# Patient Record
Sex: MALE | Race: WHITE | Employment: UNEMPLOYED | URBAN - METROPOLITAN AREA
[De-identification: names, ages, dates, MRNs, and addresses within clinical notes are randomized per-mention and may not be internally consistent; named-entity substitution may affect disease eponyms.]

---

## 2018-06-04 ENCOUNTER — TRANSFERRED RECORDS (OUTPATIENT)
Dept: HEALTH INFORMATION MANAGEMENT | Facility: CLINIC | Age: 43
End: 2018-06-04

## 2018-07-12 ENCOUNTER — TRANSFERRED RECORDS (OUTPATIENT)
Dept: HEALTH INFORMATION MANAGEMENT | Facility: CLINIC | Age: 43
End: 2018-07-12

## 2018-07-13 LAB — EJECTION FRACTION: 48

## 2018-07-30 ENCOUNTER — TRANSFERRED RECORDS (OUTPATIENT)
Dept: HEALTH INFORMATION MANAGEMENT | Facility: CLINIC | Age: 43
End: 2018-07-30

## 2018-07-30 LAB
ALT SERPL-CCNC: 35 IU/L (ref 17–63)
AST SERPL-CCNC: 80 IU/L (ref 15–41)

## 2018-08-02 LAB — EJECTION FRACTION: 53

## 2018-08-07 LAB
CREAT SERPL-MCNC: 0.4 MG/DL (ref 0.9–1.3)
GFR SERPL CREATININE-BSD FRML MDRD: >60 ML/MIN/1.73M2
GLUCOSE SERPL-MCNC: 112 MG/DL (ref 74–106)
POTASSIUM SERPL-SCNC: 4.3 MMOL/L (ref 3.6–5.1)

## 2018-09-11 ENCOUNTER — TRANSFERRED RECORDS (OUTPATIENT)
Dept: HEALTH INFORMATION MANAGEMENT | Facility: CLINIC | Age: 43
End: 2018-09-11

## 2019-01-17 ENCOUNTER — TRANSFERRED RECORDS (OUTPATIENT)
Dept: HEALTH INFORMATION MANAGEMENT | Facility: CLINIC | Age: 44
End: 2019-01-17

## 2019-01-17 LAB — EJECTION FRACTION: 53

## 2019-08-19 ENCOUNTER — HOSPITAL ENCOUNTER (EMERGENCY)
Facility: CLINIC | Age: 44
Discharge: HOME | End: 2019-08-19
Attending: EMERGENCY MEDICINE | Admitting: EMERGENCY MEDICINE

## 2019-08-19 VITALS
SYSTOLIC BLOOD PRESSURE: 134 MMHG | DIASTOLIC BLOOD PRESSURE: 104 MMHG | TEMPERATURE: 98.5 F | OXYGEN SATURATION: 98 % | RESPIRATION RATE: 16 BRPM | HEART RATE: 100 BPM

## 2019-08-19 DIAGNOSIS — R45.86 MOOD SWING: ICD-10-CM

## 2019-08-19 DIAGNOSIS — Z76.0 ENCOUNTER FOR MEDICATION REFILL: ICD-10-CM

## 2019-08-19 PROCEDURE — 99282 EMERGENCY DEPT VISIT SF MDM: CPT

## 2019-08-19 RX ORDER — IBUPROFEN 800 MG/1
800 TABLET, FILM COATED ORAL EVERY 8 HOURS PRN
Qty: 60 TABLET | Refills: 0 | Status: SHIPPED | OUTPATIENT
Start: 2019-08-19 | End: 2019-08-27

## 2019-08-19 ASSESSMENT — ENCOUNTER SYMPTOMS
MYALGIAS: 1
AGITATION: 1
FEVER: 0

## 2019-08-19 NOTE — ED AVS SNAPSHOT
[facility] Emergency Department  [address]  [facility] [address]  Phone:  [phone]  Fax:  [phone]                                    Anuj Pickens   MRN: [id number]    Department:  [facility] Emergency Department   Date of Visit:  8/19/2019           After Visit Summary Signature Page    I have received my discharge instructions, and my questions have been answered. I have discussed any challenges I see with this plan with the nurse or doctor.    ..........................................................................................................................................  Patient/Patient Representative Signature      ..........................................................................................................................................  Patient Representative Print Name and Relationship to Patient    ..................................................               ................................................  Date                                   Time    ..........................................................................................................................................  Reviewed by Signature/Title    ...................................................              ..............................................  Date                                               Time          22EPIC Rev 08/18

## 2019-08-19 NOTE — ED TRIAGE NOTES
"Pt as on \"mood stabilizers and pain meds\" during chemo for hodgkin's lymphoma.  Discharged in 3/19 and has not been on meds since.  Pt now feels he is having mood swings and angry issues and would like to restart meds.  "

## 2019-08-19 NOTE — ED PROVIDER NOTES
"  History     Chief Complaint:  Medication Refill    HPI   Anuj Pickens is a 44 year old male with a history of Non-hodgkin's lymphoma who presents for psychiatric evaluation. The patient states he was put on mood stabilizers for anger issues and bipolar disorder, but after being released from rehab in [place] he has been unable to get these medications. The patient denies fevers.     Allergies:  No Known Allergies     Medications:    Oxycodone  Melatonin  Potassium   \"Mood stabilizer\"    Past Medical History:    Non-hodgkin's lymphoma  Bipolar disorder  Headaches    Past Surgical History:    Port placement x2    Family History:    Maternal aunt: non-hodgkin's lymphoma     Social History:  Smoking Status: NA  Smokeless Tobacco: NA  Drug use: NA  Alcohol Use: NA    Marital Status:       Review of Systems   Constitutional: Negative for fever.   Musculoskeletal: Positive for myalgias (chronic pain s/p chemotherapy).   Psychiatric/Behavioral: Positive for agitation (mood swings).   All other systems reviewed and are negative.    Physical Exam     Patient Vitals for the past 24 hrs:   BP Temp Temp src Pulse Heart Rate Resp SpO2   08/19/19 1749 (!) 134/104 98.5  F (36.9  C) Oral 100 100 16 98 %      Physical Exam  Constitutional: Vital signs reviewed as above.   HEENT:    Head: No external signs of trauma noted.    Eyes: Conjunctivae are normal. Pupils are equal, round, and reactive to light.    Cardiovascular: Normal rate, regular rhythm and normal heart sounds.    Pulmonary/Chest: Effort normal and breath sounds normal. No respiratory distress. Patient has no wheezes.   Gastrointestinal: Soft, non-tender, non-distended.  Musculoskeletal: No gross deformities noted. Normal tone  Skin: Skin is warm and dry. No diaphoresis noted.   Neurological: Patient is alert and oriented to person, place, and time.   Psychiatric: Patient has a normal mood and affect. Calm. No hallucinations.      Emergency Department Course "     Emergency Department Course:    1854 Nursing notes and vitals reviewed.    1858 I performed an exam of the patient as documented above.     1910 I personally answered all questions prior to discharge    Impression & Plan      Medical Decision Making:  Anuj Pickens is a 44 year old male who presents to the emergency department today (per the patient) at the behest of his wife who wants the patient to be restarted on all of his medications.  He states that he was on medications while in [place] undergoing treatment for lymphoma, but as of March 19 when he was discharged was not given any prescriptions and then moved up here.  He states that he sometimes has anger outbursts and feels bad that he takes this out his wife.  I asked him what medications he was on but he states the medicine list is in a box that is still packed away.  He stated that he was on 5 mg oxycodone secondary to nerve pain from chemotherapy.  He has not taken this since March either.    While I sympathize with the patient, I did inform him that I would be unable to refill long-term oxycodone.  As he was unable to give me an exact medication list types and dosages I am also unable to refill these long-term medications.  Social work was not in the ED to help with primary care appointment scheduling.    I did prescribe him ibuprofen as he stated he was on this in the past.  I will encourage primary care follow-up as outlined above.  Anticipatory guidance given prior to discharge.    Diagnosis:    ICD-10-CM    1. Encounter for medication refill Z76.0    2. Mood swing R45.86      Disposition:   The patient is discharged to home.     Discharge Medications:  No discharge medications      Scribe Disclosure:  I, Winifred Wilkerson, am serving as a scribe at 6:54 PM on 8/19/2019 to document services personally performed by Jacob Ann DO based on my observations and the provider's statements to me.         [facility] EMERGENCY  DEPARTMENT       Ann, Jacob Gallego, DO  08/19/19 9516       Ann, Jacob Gallego,   08/20/19 1112

## 2019-08-20 ENCOUNTER — HOSPITAL ENCOUNTER (EMERGENCY)
Facility: CLINIC | Age: 44
Discharge: HOME | End: 2019-08-20
Attending: EMERGENCY MEDICINE | Admitting: EMERGENCY MEDICINE
Payer: COMMERCIAL

## 2019-08-20 VITALS
OXYGEN SATURATION: 98 % | RESPIRATION RATE: 16 BRPM | DIASTOLIC BLOOD PRESSURE: 79 MMHG | SYSTOLIC BLOOD PRESSURE: 118 MMHG | TEMPERATURE: 97.5 F | HEART RATE: 93 BPM

## 2019-08-20 DIAGNOSIS — R52 BODY ACHES: ICD-10-CM

## 2019-08-20 DIAGNOSIS — Z59.00 HOMELESSNESS: ICD-10-CM

## 2019-08-20 LAB
ANION GAP SERPL CALCULATED.3IONS-SCNC: 4 MMOL/L (ref 3–14)
BASOPHILS # BLD AUTO: 0.1 10E9/L (ref 0–0.2)
BASOPHILS NFR BLD AUTO: 0.8 %
BUN SERPL-MCNC: 15 MG/DL (ref 7–30)
CALCIUM SERPL-MCNC: 9.5 MG/DL (ref 8.5–10.1)
CHLORIDE SERPL-SCNC: 109 MMOL/L (ref 94–109)
CO2 SERPL-SCNC: 27 MMOL/L (ref 20–32)
CREAT SERPL-MCNC: 0.69 MG/DL (ref 0.66–1.25)
DIFFERENTIAL METHOD BLD: ABNORMAL
EOSINOPHIL # BLD AUTO: 0.2 10E9/L (ref 0–0.7)
EOSINOPHIL NFR BLD AUTO: 3.1 %
ERYTHROCYTE [DISTWIDTH] IN BLOOD BY AUTOMATED COUNT: 15.1 % (ref 10–15)
GFR SERPL CREATININE-BSD FRML MDRD: >90 ML/MIN/{1.73_M2}
GLUCOSE SERPL-MCNC: 92 MG/DL (ref 70–99)
HCT VFR BLD AUTO: 43.4 % (ref 40–53)
HGB BLD-MCNC: 14.7 G/DL (ref 13.3–17.7)
IMM GRANULOCYTES # BLD: 0 10E9/L (ref 0–0.4)
IMM GRANULOCYTES NFR BLD: 0.3 %
LYMPHOCYTES # BLD AUTO: 1.8 10E9/L (ref 0.8–5.3)
LYMPHOCYTES NFR BLD AUTO: 28.9 %
MCH RBC QN AUTO: 33.1 PG (ref 26.5–33)
MCHC RBC AUTO-ENTMCNC: 33.9 G/DL (ref 31.5–36.5)
MCV RBC AUTO: 98 FL (ref 78–100)
MONOCYTES # BLD AUTO: 0.4 10E9/L (ref 0–1.3)
MONOCYTES NFR BLD AUTO: 6.6 %
NEUTROPHILS # BLD AUTO: 3.7 10E9/L (ref 1.6–8.3)
NEUTROPHILS NFR BLD AUTO: 60.3 %
NRBC # BLD AUTO: 0 10*3/UL
NRBC BLD AUTO-RTO: 0 /100
PLATELET # BLD AUTO: 168 10E9/L (ref 150–450)
POTASSIUM SERPL-SCNC: 4 MMOL/L (ref 3.4–5.3)
RBC # BLD AUTO: 4.44 10E12/L (ref 4.4–5.9)
SODIUM SERPL-SCNC: 140 MMOL/L (ref 133–144)
WBC # BLD AUTO: 6.1 10E9/L (ref 4–11)

## 2019-08-20 PROCEDURE — 80048 BASIC METABOLIC PNL TOTAL CA: CPT | Performed by: EMERGENCY MEDICINE

## 2019-08-20 PROCEDURE — 25000132 ZZH RX MED GY IP 250 OP 250 PS 637: Performed by: EMERGENCY MEDICINE

## 2019-08-20 PROCEDURE — 99285 EMERGENCY DEPT VISIT HI MDM: CPT | Mod: 25

## 2019-08-20 PROCEDURE — 90791 PSYCH DIAGNOSTIC EVALUATION: CPT

## 2019-08-20 PROCEDURE — 85025 COMPLETE CBC W/AUTO DIFF WBC: CPT | Performed by: EMERGENCY MEDICINE

## 2019-08-20 PROCEDURE — 36415 COLL VENOUS BLD VENIPUNCTURE: CPT | Performed by: EMERGENCY MEDICINE

## 2019-08-20 RX ORDER — OXYCODONE HYDROCHLORIDE 5 MG/1
5 TABLET ORAL ONCE
Status: COMPLETED | OUTPATIENT
Start: 2019-08-20 | End: 2019-08-20

## 2019-08-20 RX ADMIN — OXYCODONE HYDROCHLORIDE 5 MG: 5 TABLET ORAL at 08:55

## 2019-08-20 SDOH — ECONOMIC STABILITY - HOUSING INSECURITY: HOMELESSNESS UNSPECIFIED: Z59.00

## 2019-08-20 ASSESSMENT — ENCOUNTER SYMPTOMS
HEADACHES: 1
MYALGIAS: 1
FEVER: 0

## 2019-08-20 NOTE — PROGRESS NOTES
Date of RN Care Coordinator Intervention: 08/20/19  Last Emergency Department Visit:  8/19  Collaborated with:  Patient; ED MD; ED RN; Yoselyn Dukes [facility] [phone]    Data:  Received request to assist with discharge planning and coordination.  Patient is currently homeless, with recent move from [place] with his family.   Will need shelter placement today along with immediate and long-term resources for housing, medical coverage and ongoing care.      Intervention:  Contact made with [facility] to reserve bed for this evening.     Transportation to [facility]-arranged by cab through [employer] (hospital account).   Patient has no funding available to pay for taxi on his own and no one able to provide transport.   [facility] will provide access to temp housing as well as assessment of needs and initiating community support services.      Assessment:  Patient in need of assist to establish services locally following recent move from [place].   Would also benefit from establishing primary care.     Plan:    Anticipated Disposition:  shelter    Barriers to d/c plan:  None    Sonia Merida RN, LICSW, CCM  RN Care Coordinator  [facility], Emergency Department  Phone  [phone]

## 2019-08-20 NOTE — PROGRESS NOTES
CM: Met with pt who is here today and last night. Pt stated he is trying to figure out what to do. The PT, his MIL, his fiancess and her 7 year old son are here. They moved her abruptly from [place] for SIg other to have a new job at the [employer] at [facility]. When they moved here there was not a plan for housing. PT and his family have been stating in Hotels for the past 3 weeks and have run out of money. Pt received SSDI . He was treated for cancer in [place], completed his CHemo/radiation. Spend time in TCU and d.c in May. Pt at baseline line uses a walker but only brought his Cane with     PT is tearful as he wants to be with his family. CHRIS had a 30 second conversation with Arely BREEN. She stated that she feels Pt is Mentally unstable. He has H/O Bi-polar and was seeing a therapist in [place].. Arely stated that pt has been claiming he wants to die. She feels pt should transfer to MH unit, Pt has never been in MH unit previously.     PT stated he just wants to figure out how to find a place to live with his family. PT is engaged. He has been with Cassandra for 7 months. He calls her child his son.     Cassandra stated that she has an appt tomorrow to apply for housing support. They have been in [place] for almost 3 weeks. She and Pt have MA from [place].    PT has spent may times living in a shelter in [place]. He does not want to stay in a shelter in [place] and became upset with Ericka when she was able to tell CHRIS that she and her child will stay with her brother in law. PT IS NOT welcome at this location     I/A  Pt is good at alerting his mood when talking with  staff. He was able to change his mood with the conversation. Being very understanding and grateful that Sw was talking with him. Becoming angry and yelling at his SO while SW was talking with her then asking for the phone and talking very softly expressing his love for her.     P: CHRIS spoke with Md about SO concerns and will have DEC assess pt for support.  Unfortunately pt does not have active MA in [place] and may have a delay in follow up support needs    SW handed of to CC in the ED should pt do well with his assessment that he will need bus tokens and referral to shelter in \[facility]\""

## 2019-08-20 NOTE — ED AVS SNAPSHOT
[facility] Emergency Department  [address]  [facility] [address]  Phone:  [phone]  Fax:  [phone]                                    Anuj Pickens   MRN: [id number]    Department:  [facility] Emergency Department   Date of Visit:  8/20/2019           After Visit Summary Signature Page    I have received my discharge instructions, and my questions have been answered. I have discussed any challenges I see with this plan with the nurse or doctor.    ..........................................................................................................................................  Patient/Patient Representative Signature      ..........................................................................................................................................  Patient Representative Print Name and Relationship to Patient    ..................................................               ................................................  Date                                   Time    ..........................................................................................................................................  Reviewed by Signature/Title    ...................................................              ..............................................  Date                                               Time          22EPIC Rev 08/18

## 2019-08-20 NOTE — DISCHARGE INSTRUCTIONS
Please follow-up with shelter resources provided as well as outpatient therapy resources provided.    It will be important for you to establish care now that you are living in the [facility].    You are welcome to return to the ER at any point with new or troubling symptoms, concerns for your own safety, or any other issues.    Discharge Instructions  Mental Health Concerns    You were seen today for mental health concerns, such as depression, anxiety, or suicidal thinking. Your provider feels that you do not require hospitalization at this time. However, your symptoms may become worse, and you may need to return to the Emergency Department. Most treatments of depression and suicidal thoughts are a process rather than a single intervention.  Medications and counseling can take several weeks or more to help.    Generally, every Emergency Department visit should have a follow-up clinic visit with either a primary or a specialty clinic/provider. Please follow-up as instructed by your emergency provider today.    By accepting these discharge instructions:  You promise to not harm yourself or others.  You agree that if you feel you are becoming unable to keep that promise, you will do something to help yourself before you do anything to harm yourself or others.   You agree to keep any safety plan arranged on your visit here today.  You agree to take any medication prescribed or recommended by your provider.  If you are getting worse, you can contact a friend or a family member, contact your counselor or family provider, contact a crisis line, or other options discussed with the provider or therapist today.  At any time, you can call 911 and return to the Emergency Department for more help.  You understand that follow-up is essential to your treatment, and you will make and keep appointments recommended on your visit today.    How to improve your mental health and prevent suicide:  Involve others by letting family,  friends, counselors know.  Do not isolate yourself.  Avoid alcohol or drugs. Remove weapons, poisons from your home.  Try to stick to routines for eating, sleeping and getting regular exercise.    Try to get into sunlight. Bright natural light not only treats seasonal affective disorder but also depression.  Increase safe activities that you enjoy.    If you feel worse, contact [phone] ([phone]), or call 911, or your primary provider/counselor for additional assistance.    If you were given a prescription for medicine here today, be sure to read all of the information (including the package insert) that comes with your prescription.  This will include important information about the medicine, its side effects, and any warnings that you need to know about.  The pharmacist who fills the prescription can provide more information and answer questions you may have about the medicine.  If you have questions or concerns that the pharmacist cannot address, please call or return to the Emergency Department.   Remember that you can always come back to the Emergency Department if you are not able to see your regular provider in the amount of time listed above, if you get any new symptoms, or if there is anything that worries you.

## 2019-08-20 NOTE — ED PROVIDER NOTES
"  History     Chief Complaint:  Myalgias    HPI   Anuj Pickens is a 44 year old male, with history of non-hodgkin's lymphoma and diagnosed with bipolar disorder, recently moved here from [place] with family, who presents alone for evaluation of generalized malaise and request for pain medications in the setting of being homeless. Patient was seen in the ED for similar complaint yesterday, but was discharged without prescriptions for his chronic issues as he was unable to provide an exact medication list type and dosages and social work was not available at the time. Patient reports he returns this morning as he began to develop worsening myalgias and migraine headache, prompting his presentation.     Here in the ED, patient states that he is homeless and has chronic pain secondary to his previous cancer therapy, thus would like pain medications. He states that he recently moved out to [place] approximately one month ago after finishing chemotherapy treatment at [facility] in [place], [place] \"a while ago\". He notes that he and his family were initially staying in motels, but then ran out of money and has no mode of transportation. He denies any noted fever.  He denies any other complaints or concerns at this time.     Allergies:  No Known Allergies      Medications:    Ibuprofen     Past Medical History:    Non-hodgkin's lymphoma  Bipolar disorder  Headaches     Past Surgical History:    Port placement x2     Family History:    Maternal aunt: non-hodgkin's lymphoma      Social History:  Smoking Status: NA  Smokeless Tobacco: NA  Drug use: NA  Alcohol Use: NA    Marital Status:      Review of Systems   Constitutional: Negative for fever.   Musculoskeletal: Positive for myalgias.   Neurological: Positive for headaches.   All other systems reviewed and are negative.      Physical Exam   Vitals:  Patient Vitals for the past 24 hrs:   BP Temp Temp src Pulse Heart Rate Resp SpO2   08/20/19 0703 -- -- -- 93 93 -- " --   08/20/19 0702 (!) 127/115 97.5  F (36.4  C) Temporal -- -- 18 98 %        Physical Exam  General:              Well-nourished              Speaking in full sentences              Resting comfortably on gurney  Eyes:              Conjunctiva without injection or scleral icterus  ENT:              Moist mucous membranes              Nares patent              Pinnae normal  Neck:              Full ROM              No stiffness appreciated  Resp:              Lungs CTAB              No crackles, wheezing or audible rubs              Good air movement  CV:                    Normal rate, regular rhythm              S1 and S2 present              No murmur, gallop or rub  GI:              BS present              Abdomen soft without distention              Non-tender to light and deep palpation              No guarding or rebound tenderness  Skin:              Warm, dry, well perfused              No rashes or open wounds on exposed skin              Port in place to right upper chest wall              No overlying skin changes  MSK:              Moves all extremities              No focal deformities or swelling  Neuro:              Alert              Answers questions appropriately              Moves all extremities equally              Gait stable  Psych:              Normal affect, normal mood  Emergency Department Course     Laboratory:  Laboratory findings were communicated with the patient who voiced understanding of the findings.  CBC: AWNL (WBC 6.1, HGB 14.7, )  BMP: AWNL (Creatinine 0.69)     Interventions:  0855 Oxycodone 5 mg PO     Emergency Department Course:  Nursing notes and vitals reviewed.  Blood was drawn for laboratory testing, results above.     (2872)   I performed an exam of the patient as documented above. History obtained from patient.    (8466)   I spoke with Corinne of the Social Work service regarding patient's presentation, findings, and plan of care.     (3630)   I spoke with  Corinne of the Social Work service regarding patient's presentation, findings, and plan of care. I spoke with Corinne of the Social Work service regarding patient's presentation, findings, and plan of care. She spoke to wife/irvin over the phone. She states that she has an appointment for housing tomorrow. Will have DEC assess patient.     (1032)   I spoke with DEC service regarding patient's presentation, findings, and plan of care.     (1044)   I spoke with DEC service regarding patient's presentation, findings, and plan of care.     (1121)   I spoke with DEC service regarding patient's presentation, findings, and plan of care.     (1143)   I spoke with DEC service regarding patient's presentation, findings, and plan of care.     (1151)   I spoke with Corinne of the Social Work service regarding patient's presentation, findings, and plan of care.    (1200)   I spoke with Sonia of the Patient Management Care Coordinator service regarding patient's presentation, findings, and plan of care. Will help patient establish PCP, shelter and bus tokens. She will speak to patient.     (1230)   Updated that patient will be discharged with several resources and a ride.     (1245)   Updated patient and discharge instructions provided.     I discussed the treatment plan with the patient. They expressed understanding of this plan and consented to discharge. They will be discharged home with instructions for care and follow up. In addition, the patient will return to the emergency department if their symptoms persist, worsen, if new symptoms arise or if there is any concern.  All questions were answered.     I personally reviewed the laboratory results with the Patient and answered all related questions prior to discharge.    Impression & Plan      Medical Decision Making:  Anuj Pickens is a 44-year-old male with a past medical history significant for non-Hodgkin's lymphoma, presenting to the emergency department for  evaluation of body aches.  VS on presentation reveal mildly elevated diastolic BP, though otherwise are unremarkable.  Additional history is gleaned from previous records available in epic, as well as review of faxed records from patient's previous hospital in [place].  Medically, he has completed treatment for non-Hodgkin's lymphoma, and is currently in remission.  With regards to his presenting complaints, he acknowledges chronic pain secondary to previous chemotherapy and radiation treatments.  At this time, he is clinically well-appearing, resting comfortably on the gurney, not found to be in any acute distress.  He was provided 1 dose of analgesia in concordance with our chronic pain policy, noting improvements in symptoms.  With regards to patient's headache, he acknowledges this seems similar to previous migraine headaches.  I feel this is not consistent or suggestive of acute intracranial process such as subarachnoid hemorrhage, intracranial mass, meningitis, or encephalitis requiring further work-up.  Baseline screening labs including CBC and BMP returned unremarkable.  I suspect a large contributing factor to patient's representation stems from his current social situation.  He left his home state of [place] rather abruptly, and currently is without a primary medical team, nor housing resources.  For that reason, social work was consulted and have seen and met with the patient here in the ER.  Please refer to their associated documentation.  Social work also obtain additional information from the patient's significant other, who acknowledged some concerns for mental health stability.  For that reason, DEC was additionally consulted.  They have also met with the patient, and contacted the patient's significant other to obtain collateral information.  She does acknowledge there have been previous comments regarding self-harm, though this has been present for a fairly long period of time.  DEC does not feel  the patient to be at imminent risk to himself or others in the outpatient environment.  I again met with the , who has graciously worked to provide the patient bus tokens, and transportation to a homeless shelter.  She explained this homeless shelter will be able to provide psychiatric assessment, and further assist the patient with establishing medical care in light of his limited financial and social resources.  On reevaluation, the patient felt very comfortable with this plan, and was gracious for the resources provided.  He is certainly welcome to return to the ER should he develop any other new or troubling symptoms or concerns regarding his own safety.  Additionally, therapy resources were provided to the patient, as recommended by DEC.  All of patient's questions were answered prior to discharge.    Diagnosis:    ICD-10-CM    1. Body aches R52    2. Homelessness Z59.0         Disposition:   Discharged.    Scribe Disclosure:  I, Starr Carroll, am serving as a scribe at 8:31 AM on 8/20/2019 to document services personally performed by Tolu Urias MD, based on my observations and the provider's statements to me.  8/20/2019   [facility] EMERGENCY DEPARTMENT       Tolu Urias MD  08/21/19 0946

## 2020-09-24 ENCOUNTER — TELEPHONE (OUTPATIENT)
Dept: SCHEDULING | Age: 45
End: 2020-09-24

## 2020-09-29 ENCOUNTER — TELEPHONE (OUTPATIENT)
Dept: SCHEDULING | Age: 45
End: 2020-09-29

## 2020-09-29 ENCOUNTER — TELEPHONIC VISIT (OUTPATIENT)
Dept: FAMILY MEDICINE | Age: 45
End: 2020-09-29

## 2020-10-06 ENCOUNTER — TELEPHONE (OUTPATIENT)
Dept: SCHEDULING | Age: 45
End: 2020-10-06

## 2020-10-09 ENCOUNTER — TELEPHONE (OUTPATIENT)
Dept: SCHEDULING | Age: 45
End: 2020-10-09

## 2020-10-21 ENCOUNTER — TELEPHONIC VISIT (OUTPATIENT)
Dept: FAMILY MEDICINE | Age: 45
End: 2020-10-21

## 2020-10-21 RX ORDER — TIZANIDINE 4 MG/1
4 TABLET ORAL 2 TIMES DAILY
COMMUNITY
End: 2021-01-25 | Stop reason: SDUPTHER

## 2020-10-21 RX ORDER — TRAMADOL HYDROCHLORIDE 50 MG/1
50 TABLET ORAL EVERY 6 HOURS PRN
COMMUNITY
End: 2020-12-09 | Stop reason: SDUPTHER

## 2020-10-21 RX ORDER — CLONIDINE HYDROCHLORIDE 0.1 MG/1
0.1 TABLET ORAL 2 TIMES DAILY
COMMUNITY
End: 2020-12-09 | Stop reason: SDUPTHER

## 2020-10-21 RX ORDER — IBUPROFEN 800 MG/1
800 TABLET ORAL EVERY 6 HOURS PRN
COMMUNITY
End: 2021-01-25 | Stop reason: SDUPTHER

## 2020-10-21 RX ORDER — GABAPENTIN 300 MG/1
300 CAPSULE ORAL 2 TIMES DAILY
COMMUNITY
End: 2020-12-09 | Stop reason: SDUPTHER

## 2020-10-21 RX ORDER — AMITRIPTYLINE HYDROCHLORIDE 50 MG/1
50 TABLET, FILM COATED ORAL NIGHTLY
COMMUNITY
End: 2020-12-09 | Stop reason: SDUPTHER

## 2020-10-21 RX ORDER — FERROUS SULFATE 325(65) MG
325 TABLET ORAL
COMMUNITY
End: 2021-02-23 | Stop reason: ALTCHOICE

## 2020-10-23 ENCOUNTER — TELEPHONE (OUTPATIENT)
Dept: SCHEDULING | Age: 45
End: 2020-10-23

## 2020-10-26 ENCOUNTER — TELEPHONE (OUTPATIENT)
Dept: SCHEDULING | Age: 45
End: 2020-10-26

## 2020-11-03 ENCOUNTER — TELEPHONE (OUTPATIENT)
Dept: SCHEDULING | Age: 45
End: 2020-11-03

## 2020-11-09 ENCOUNTER — V-VISIT (OUTPATIENT)
Dept: FAMILY MEDICINE | Age: 45
End: 2020-11-09

## 2020-11-09 DIAGNOSIS — I10 ESSENTIAL HYPERTENSION: ICD-10-CM

## 2020-11-09 DIAGNOSIS — M54.9 CHRONIC BACK PAIN, UNSPECIFIED BACK LOCATION, UNSPECIFIED BACK PAIN LATERALITY: ICD-10-CM

## 2020-11-09 DIAGNOSIS — Z85.71 HISTORY OF HODGKIN'S LYMPHOMA: Primary | ICD-10-CM

## 2020-11-09 DIAGNOSIS — G89.29 CHRONIC BACK PAIN, UNSPECIFIED BACK LOCATION, UNSPECIFIED BACK PAIN LATERALITY: ICD-10-CM

## 2020-11-09 PROCEDURE — 99203 OFFICE O/P NEW LOW 30 MIN: CPT | Performed by: FAMILY MEDICINE

## 2020-11-09 ASSESSMENT — PATIENT HEALTH QUESTIONNAIRE - PHQ9
SUM OF ALL RESPONSES TO PHQ9 QUESTIONS 1 AND 2: 0
1. LITTLE INTEREST OR PLEASURE IN DOING THINGS: NOT AT ALL
CLINICAL INTERPRETATION OF PHQ2 SCORE: NO FURTHER SCREENING NEEDED
CLINICAL INTERPRETATION OF PHQ9 SCORE: NO FURTHER SCREENING NEEDED
SUM OF ALL RESPONSES TO PHQ9 QUESTIONS 1 AND 2: 0
2. FEELING DOWN, DEPRESSED OR HOPELESS: NOT AT ALL

## 2020-11-11 ENCOUNTER — TELEPHONE (OUTPATIENT)
Dept: SCHEDULING | Age: 45
End: 2020-11-11

## 2020-11-11 ASSESSMENT — ENCOUNTER SYMPTOMS
COUGH: 0
FEVER: 0
SHORTNESS OF BREATH: 0
CHILLS: 0
FATIGUE: 0
BACK PAIN: 1
WHEEZING: 0
ABDOMINAL PAIN: 0

## 2020-11-24 ENCOUNTER — TELEPHONE (OUTPATIENT)
Dept: SCHEDULING | Age: 45
End: 2020-11-24

## 2020-12-10 RX ORDER — AMITRIPTYLINE HYDROCHLORIDE 50 MG/1
50 TABLET, FILM COATED ORAL NIGHTLY
Qty: 30 TABLET | Refills: 0 | Status: SHIPPED | OUTPATIENT
Start: 2020-12-10 | End: 2021-01-07

## 2020-12-10 RX ORDER — CLONIDINE HYDROCHLORIDE 0.1 MG/1
0.1 TABLET ORAL 2 TIMES DAILY
Qty: 60 TABLET | Refills: 0 | Status: SHIPPED | OUTPATIENT
Start: 2020-12-10 | End: 2021-01-07

## 2020-12-10 RX ORDER — TRAMADOL HYDROCHLORIDE 50 MG/1
50 TABLET ORAL EVERY 6 HOURS PRN
Qty: 30 TABLET | Refills: 0 | Status: SHIPPED | OUTPATIENT
Start: 2020-12-10 | End: 2021-01-25 | Stop reason: SDUPTHER

## 2020-12-10 RX ORDER — GABAPENTIN 300 MG/1
300 CAPSULE ORAL 2 TIMES DAILY
Qty: 60 CAPSULE | Refills: 0 | Status: SHIPPED | OUTPATIENT
Start: 2020-12-10 | End: 2021-01-07

## 2020-12-16 ENCOUNTER — TELEPHONIC VISIT (OUTPATIENT)
Dept: FAMILY MEDICINE | Age: 45
End: 2020-12-16

## 2020-12-16 DIAGNOSIS — Z85.71 HISTORY OF HODGKIN'S LYMPHOMA: Primary | ICD-10-CM

## 2020-12-16 DIAGNOSIS — G89.29 CHRONIC BACK PAIN, UNSPECIFIED BACK LOCATION, UNSPECIFIED BACK PAIN LATERALITY: ICD-10-CM

## 2020-12-16 DIAGNOSIS — M54.9 CHRONIC BACK PAIN, UNSPECIFIED BACK LOCATION, UNSPECIFIED BACK PAIN LATERALITY: ICD-10-CM

## 2020-12-16 DIAGNOSIS — I10 ESSENTIAL HYPERTENSION: ICD-10-CM

## 2020-12-16 PROCEDURE — 99442 TELEPHONE E&M BY PHYSICIAN EST PT NOT ORIG PREV 7 DAYS 11-20 MIN: CPT | Performed by: FAMILY MEDICINE

## 2020-12-16 ASSESSMENT — ANXIETY QUESTIONNAIRES
GAD7 TOTAL SCORE: 11
4. TROUBLE RELAXING: 1
1. FEELING NERVOUS, ANXIOUS, OR ON EDGE: NOT AT ALL
5. BEING SO RESTLESS THAT IT IS HARD TO SIT STILL: 0
4. TROUBLE RELAXING: SEVERAL DAYS
3. WORRYING TOO MUCH ABOUT DIFFERENT THINGS: 3
3. WORRYING TOO MUCH ABOUT DIFFERENT THINGS: NEARLY EVERY DAY
7. FEELING AFRAID AS IF SOMETHING AWFUL MIGHT HAPPEN: 3
7. FEELING AFRAID AS IF SOMETHING AWFUL MIGHT HAPPEN: NEARLY EVERY DAY
6. BECOMING EASILY ANNOYED OR IRRITABLE: 1
5. BEING SO RESTLESS THAT IT IS HARD TO SIT STILL: NOT AT ALL
IF YOU CHECKED OFF ANY PROBLEMS ON THIS QUESTIONNAIRE, HOW DIFFICULT HAVE THESE PROBLEMS MADE IT FOR YOU TO DO YOUR WORK, TAKE CARE OF THINGS AT HOME, OR GET ALONG WITH OTHER PEOPLE: SOMEWHAT DIFFICULT
2. NOT BEING ABLE TO STOP OR CONTROL WORRYING: NEARLY EVERY DAY
1. FEELING NERVOUS, ANXIOUS, OR ON EDGE: 0
2. NOT BEING ABLE TO STOP OR CONTROL WORRYING: 3
6. BECOMING EASILY ANNOYED OR IRRITABLE: SEVERAL DAYS

## 2020-12-16 ASSESSMENT — PATIENT HEALTH QUESTIONNAIRE - PHQ9
CLINICAL INTERPRETATION OF PHQ2 SCORE: NO FURTHER SCREENING NEEDED
2. FEELING DOWN, DEPRESSED OR HOPELESS: SEVERAL DAYS
CLINICAL INTERPRETATION OF PHQ9 SCORE: NO FURTHER SCREENING NEEDED
SUM OF ALL RESPONSES TO PHQ9 QUESTIONS 1 AND 2: 1
1. LITTLE INTEREST OR PLEASURE IN DOING THINGS: NOT AT ALL
SUM OF ALL RESPONSES TO PHQ9 QUESTIONS 1 AND 2: 1

## 2020-12-16 ASSESSMENT — ENCOUNTER SYMPTOMS
COUGH: 0
BACK PAIN: 1
CHILLS: 0
FEVER: 0
ABDOMINAL PAIN: 0
FATIGUE: 0
SHORTNESS OF BREATH: 0
WHEEZING: 0

## 2021-01-01 ENCOUNTER — EXTERNAL RECORD (OUTPATIENT)
Dept: HEALTH INFORMATION MANAGEMENT | Facility: OTHER | Age: 46
End: 2021-01-01

## 2021-01-06 ENCOUNTER — TELEPHONE (OUTPATIENT)
Dept: SCHEDULING | Age: 46
End: 2021-01-06

## 2021-01-07 ENCOUNTER — TELEPHONE (OUTPATIENT)
Dept: SCHEDULING | Age: 46
End: 2021-01-07

## 2021-01-07 RX ORDER — CLONIDINE HYDROCHLORIDE 0.1 MG/1
TABLET ORAL
Qty: 60 TABLET | Refills: 0 | Status: SHIPPED | OUTPATIENT
Start: 2021-01-07 | End: 2021-02-04

## 2021-01-07 RX ORDER — AMITRIPTYLINE HYDROCHLORIDE 50 MG/1
TABLET, FILM COATED ORAL
Qty: 30 TABLET | Refills: 0 | Status: SHIPPED | OUTPATIENT
Start: 2021-01-07 | End: 2021-01-25 | Stop reason: SDUPTHER

## 2021-01-07 RX ORDER — GABAPENTIN 300 MG/1
CAPSULE ORAL
Qty: 60 CAPSULE | Refills: 0 | Status: SHIPPED | OUTPATIENT
Start: 2021-01-07 | End: 2021-02-02

## 2021-01-16 RX ORDER — AMITRIPTYLINE HYDROCHLORIDE 50 MG/1
50 TABLET, FILM COATED ORAL NIGHTLY
Qty: 30 TABLET | Refills: 0 | OUTPATIENT
Start: 2021-01-16

## 2021-01-16 RX ORDER — TIZANIDINE 4 MG/1
4 TABLET ORAL 2 TIMES DAILY
OUTPATIENT
Start: 2021-01-16

## 2021-01-16 RX ORDER — TRAMADOL HYDROCHLORIDE 50 MG/1
50 TABLET ORAL EVERY 6 HOURS PRN
Qty: 30 TABLET | Refills: 0 | OUTPATIENT
Start: 2021-01-16

## 2021-01-16 RX ORDER — CLONIDINE HYDROCHLORIDE 0.1 MG/1
0.1 TABLET ORAL 2 TIMES DAILY
Qty: 60 TABLET | Refills: 0 | OUTPATIENT
Start: 2021-01-16

## 2021-01-16 RX ORDER — GABAPENTIN 300 MG/1
300 CAPSULE ORAL 2 TIMES DAILY
Qty: 60 CAPSULE | Refills: 0 | OUTPATIENT
Start: 2021-01-16

## 2021-01-19 ENCOUNTER — TELEPHONE (OUTPATIENT)
Dept: SCHEDULING | Age: 46
End: 2021-01-19

## 2021-01-19 ENCOUNTER — OFFICE VISIT (OUTPATIENT)
Dept: FAMILY MEDICINE | Age: 46
End: 2021-01-19

## 2021-01-19 VITALS
SYSTOLIC BLOOD PRESSURE: 133 MMHG | WEIGHT: 175.49 LBS | HEART RATE: 82 BPM | HEIGHT: 68 IN | TEMPERATURE: 98.3 F | DIASTOLIC BLOOD PRESSURE: 94 MMHG | BODY MASS INDEX: 26.6 KG/M2

## 2021-01-19 DIAGNOSIS — I10 ESSENTIAL HYPERTENSION: ICD-10-CM

## 2021-01-19 DIAGNOSIS — Z85.71 HISTORY OF HODGKIN'S LYMPHOMA: Primary | ICD-10-CM

## 2021-01-19 DIAGNOSIS — M54.9 CHRONIC BACK PAIN, UNSPECIFIED BACK LOCATION, UNSPECIFIED BACK PAIN LATERALITY: ICD-10-CM

## 2021-01-19 DIAGNOSIS — G89.29 CHRONIC BACK PAIN, UNSPECIFIED BACK LOCATION, UNSPECIFIED BACK PAIN LATERALITY: ICD-10-CM

## 2021-01-19 PROCEDURE — 85025 COMPLETE CBC W/AUTO DIFF WBC: CPT | Performed by: FAMILY MEDICINE

## 2021-01-19 PROCEDURE — 99215 OFFICE O/P EST HI 40 MIN: CPT | Performed by: FAMILY MEDICINE

## 2021-01-19 PROCEDURE — 3080F DIAST BP >= 90 MM HG: CPT | Performed by: FAMILY MEDICINE

## 2021-01-19 PROCEDURE — 80053 COMPREHEN METABOLIC PANEL: CPT | Performed by: FAMILY MEDICINE

## 2021-01-19 PROCEDURE — 3075F SYST BP GE 130 - 139MM HG: CPT | Performed by: FAMILY MEDICINE

## 2021-01-19 ASSESSMENT — PATIENT HEALTH QUESTIONNAIRE - PHQ9
1. LITTLE INTEREST OR PLEASURE IN DOING THINGS: NOT AT ALL
CLINICAL INTERPRETATION OF PHQ9 SCORE: NO FURTHER SCREENING NEEDED
SUM OF ALL RESPONSES TO PHQ9 QUESTIONS 1 AND 2: 0
CLINICAL INTERPRETATION OF PHQ2 SCORE: NO FURTHER SCREENING NEEDED
2. FEELING DOWN, DEPRESSED OR HOPELESS: NOT AT ALL
SUM OF ALL RESPONSES TO PHQ9 QUESTIONS 1 AND 2: 0

## 2021-01-20 PROBLEM — I10 ESSENTIAL HYPERTENSION: Status: ACTIVE | Noted: 2021-01-20

## 2021-01-20 LAB
ALBUMIN SERPL-MCNC: 4.4 G/DL (ref 3.6–5.1)
ALBUMIN/GLOB SERPL: 1.4 {RATIO} (ref 1–2.4)
ALP SERPL-CCNC: 181 UNITS/L (ref 45–117)
ALT SERPL-CCNC: 80 UNITS/L
ANION GAP SERPL CALC-SCNC: 13 MMOL/L (ref 10–20)
AST SERPL-CCNC: 30 UNITS/L
BASOPHILS # BLD: 0.1 K/MCL (ref 0–0.3)
BASOPHILS NFR BLD: 1 %
BILIRUB SERPL-MCNC: 0.6 MG/DL (ref 0.2–1)
BUN SERPL-MCNC: 19 MG/DL (ref 6–20)
BUN/CREAT SERPL: 20 (ref 7–25)
CALCIUM SERPL-MCNC: 9.5 MG/DL (ref 8.4–10.2)
CHLORIDE SERPL-SCNC: 103 MMOL/L (ref 98–107)
CO2 SERPL-SCNC: 27 MMOL/L (ref 21–32)
CREAT SERPL-MCNC: 0.97 MG/DL (ref 0.67–1.17)
DEPRECATED RDW RBC: 47.1 FL (ref 39–50)
EOSINOPHIL # BLD: 0.4 K/MCL (ref 0–0.5)
EOSINOPHIL NFR BLD: 5 %
ERYTHROCYTE [DISTWIDTH] IN BLOOD: 13.3 % (ref 11–15)
FASTING DURATION TIME PATIENT: ABNORMAL H
GFR SERPLBLD BASED ON 1.73 SQ M-ARVRAT: >90 ML/MIN/1.73M2
GLOBULIN SER-MCNC: 3.2 G/DL (ref 2–4)
GLUCOSE SERPL-MCNC: 92 MG/DL (ref 65–99)
HCT VFR BLD CALC: 45 % (ref 39–51)
HGB BLD-MCNC: 15.3 G/DL (ref 13–17)
IMM GRANULOCYTES # BLD AUTO: 0.1 K/MCL (ref 0–0.2)
IMM GRANULOCYTES # BLD: 1 %
LYMPHOCYTES # BLD: 2.9 K/MCL (ref 1–4.8)
LYMPHOCYTES NFR BLD: 39 %
MCH RBC QN AUTO: 32.6 PG (ref 26–34)
MCHC RBC AUTO-ENTMCNC: 34 G/DL (ref 32–36.5)
MCV RBC AUTO: 95.9 FL (ref 78–100)
MONOCYTES # BLD: 0.4 K/MCL (ref 0.3–0.9)
MONOCYTES NFR BLD: 5 %
NEUTROPHILS # BLD: 3.7 K/MCL (ref 1.8–7.7)
NEUTROPHILS NFR BLD: 49 %
NRBC BLD MANUAL-RTO: 0 /100 WBC
PLATELET # BLD AUTO: 202 K/MCL (ref 140–450)
POTASSIUM SERPL-SCNC: 4.3 MMOL/L (ref 3.4–5.1)
PROT SERPL-MCNC: 7.6 G/DL (ref 6.4–8.2)
RBC # BLD: 4.69 MIL/MCL (ref 4.5–5.9)
SODIUM SERPL-SCNC: 139 MMOL/L (ref 135–145)
WBC # BLD: 7.5 K/MCL (ref 4.2–11)

## 2021-01-20 ASSESSMENT — ENCOUNTER SYMPTOMS
SHORTNESS OF BREATH: 0
WHEEZING: 0
CHILLS: 0
BACK PAIN: 1
COUGH: 0
FATIGUE: 0
FEVER: 0

## 2021-01-25 RX ORDER — TRAMADOL HYDROCHLORIDE 50 MG/1
50 TABLET ORAL EVERY 6 HOURS PRN
Qty: 30 TABLET | Refills: 0 | Status: SHIPPED | OUTPATIENT
Start: 2021-01-25 | End: 2021-02-03 | Stop reason: SDUPTHER

## 2021-01-25 RX ORDER — AMITRIPTYLINE HYDROCHLORIDE 50 MG/1
50 TABLET, FILM COATED ORAL NIGHTLY
Qty: 30 TABLET | Refills: 0 | Status: SHIPPED | OUTPATIENT
Start: 2021-01-25 | End: 2021-01-30 | Stop reason: SDUPTHER

## 2021-01-25 RX ORDER — IBUPROFEN 800 MG/1
800 TABLET ORAL EVERY 8 HOURS PRN
Qty: 30 TABLET | Refills: 0 | Status: SHIPPED | OUTPATIENT
Start: 2021-01-25 | End: 2021-03-02 | Stop reason: SDUPTHER

## 2021-01-25 RX ORDER — TIZANIDINE 4 MG/1
4 TABLET ORAL 2 TIMES DAILY
Qty: 60 TABLET | Refills: 0 | Status: SHIPPED | OUTPATIENT
Start: 2021-01-25 | End: 2021-01-30 | Stop reason: SDUPTHER

## 2021-01-25 RX ORDER — FERROUS SULFATE 325(65) MG
325 TABLET ORAL
OUTPATIENT
Start: 2021-01-25

## 2021-01-26 ENCOUNTER — TELEPHONE (OUTPATIENT)
Dept: SCHEDULING | Age: 46
End: 2021-01-26

## 2021-01-31 RX ORDER — AMITRIPTYLINE HYDROCHLORIDE 50 MG/1
50 TABLET, FILM COATED ORAL NIGHTLY
Qty: 30 TABLET | Refills: 1 | Status: SHIPPED | OUTPATIENT
Start: 2021-01-31 | End: 2021-03-02 | Stop reason: SDUPTHER

## 2021-02-01 ENCOUNTER — TELEPHONE (OUTPATIENT)
Dept: SCHEDULING | Age: 46
End: 2021-02-01

## 2021-02-01 RX ORDER — TIZANIDINE 4 MG/1
4 TABLET ORAL 2 TIMES DAILY
Qty: 60 TABLET | Refills: 0 | Status: SHIPPED | OUTPATIENT
Start: 2021-02-01 | End: 2021-03-02

## 2021-02-01 RX ORDER — FERROUS SULFATE 325(65) MG
325 TABLET ORAL
Qty: 90 TABLET | Refills: 0 | OUTPATIENT
Start: 2021-02-01

## 2021-02-02 RX ORDER — GABAPENTIN 300 MG/1
CAPSULE ORAL
Qty: 60 CAPSULE | Refills: 0 | Status: SHIPPED | OUTPATIENT
Start: 2021-02-02 | End: 2021-03-02 | Stop reason: SDUPTHER

## 2021-02-03 ENCOUNTER — TELEPHONE (OUTPATIENT)
Dept: SCHEDULING | Age: 46
End: 2021-02-03

## 2021-02-03 RX ORDER — TRAMADOL HYDROCHLORIDE 50 MG/1
50 TABLET ORAL EVERY 6 HOURS PRN
Qty: 30 TABLET | Refills: 0 | Status: SHIPPED | OUTPATIENT
Start: 2021-02-03 | End: 2021-03-02 | Stop reason: SDUPTHER

## 2021-02-03 RX ORDER — TRAMADOL HYDROCHLORIDE 50 MG/1
50 TABLET ORAL EVERY 6 HOURS PRN
Qty: 30 TABLET | Refills: 0 | Status: CANCELLED | OUTPATIENT
Start: 2021-02-03

## 2021-02-04 RX ORDER — CLONIDINE HYDROCHLORIDE 0.1 MG/1
TABLET ORAL
Qty: 60 TABLET | Refills: 0 | Status: SHIPPED | OUTPATIENT
Start: 2021-02-04 | End: 2021-03-02

## 2021-02-15 ENCOUNTER — TELEPHONE (OUTPATIENT)
Dept: SCHEDULING | Age: 46
End: 2021-02-15

## 2021-02-16 ENCOUNTER — TELEPHONE (OUTPATIENT)
Dept: SCHEDULING | Age: 46
End: 2021-02-16

## 2021-02-18 ENCOUNTER — APPOINTMENT (OUTPATIENT)
Dept: HEMATOLOGY/ONCOLOGY | Age: 46
End: 2021-02-18

## 2021-02-23 ENCOUNTER — TELEPHONE (OUTPATIENT)
Dept: SCHEDULING | Age: 46
End: 2021-02-23

## 2021-02-23 ENCOUNTER — TELEPHONIC VISIT (OUTPATIENT)
Dept: FAMILY MEDICINE | Age: 46
End: 2021-02-23

## 2021-02-23 ENCOUNTER — V-VISIT (OUTPATIENT)
Dept: FAMILY MEDICINE | Age: 46
End: 2021-02-23

## 2021-02-23 ENCOUNTER — APPOINTMENT (OUTPATIENT)
Dept: FAMILY MEDICINE | Age: 46
End: 2021-02-23

## 2021-02-23 DIAGNOSIS — I10 ESSENTIAL HYPERTENSION: ICD-10-CM

## 2021-02-23 DIAGNOSIS — R69 DIAGNOSIS DEFERRED: Primary | ICD-10-CM

## 2021-02-23 DIAGNOSIS — M54.9 CHRONIC BACK PAIN, UNSPECIFIED BACK LOCATION, UNSPECIFIED BACK PAIN LATERALITY: ICD-10-CM

## 2021-02-23 DIAGNOSIS — Z85.71 HISTORY OF HODGKIN'S LYMPHOMA: Primary | ICD-10-CM

## 2021-02-23 DIAGNOSIS — G89.29 CHRONIC BACK PAIN, UNSPECIFIED BACK LOCATION, UNSPECIFIED BACK PAIN LATERALITY: ICD-10-CM

## 2021-02-23 PROCEDURE — 99441 TELEPHONE E&M BY PHYSICIAN EST PT NOT ORIG PREV 7 DAYS 5-10 MIN: CPT | Performed by: FAMILY MEDICINE

## 2021-02-23 ASSESSMENT — PATIENT HEALTH QUESTIONNAIRE - PHQ9
SUM OF ALL RESPONSES TO PHQ QUESTIONS 1-9: 17
SUM OF ALL RESPONSES TO PHQ9 QUESTIONS 1 TO 9: 17
CLINICAL INTERPRETATION OF PHQ2 SCORE: FURTHER SCREENING NEEDED
SUM OF ALL RESPONSES TO PHQ9 QUESTIONS 1 AND 2: 6
3. TROUBLE FALLING OR STAYING ASLEEP OR SLEEPING TOO MUCH: MORE THAN HALF THE DAYS
6. FEELING BAD ABOUT YOURSELF - OR THAT YOU ARE A FAILURE OR HAVE LET YOURSELF OR YOUR FAMILY DOWN: SEVERAL DAYS
CLINICAL INTERPRETATION OF PHQ9 SCORE: MODERATELY SEVERE DEPRESSION
CLINICAL INTERPRETATION OF PHQ9 SCORE: FURTHER SCREENING NEEDED
9. THOUGHTS THAT YOU WOULD BE BETTER OFF DEAD, OR OF HURTING YOURSELF: NOT AT ALL
7. TROUBLE CONCENTRATING ON THINGS, SUCH AS READING THE NEWSPAPER OR WATCHING TELEVISION: MORE THAN HALF THE DAYS
5. POOR APPETITE, WEIGHT LOSS, OR OVEREATING: NEARLY EVERY DAY
SUM OF ALL RESPONSES TO PHQ9 QUESTIONS 1 AND 2: 6
2. FEELING DOWN, DEPRESSED OR HOPELESS: NEARLY EVERY DAY
4. FEELING TIRED OR HAVING LITTLE ENERGY: NOT AT ALL
8. MOVING OR SPEAKING SO SLOWLY THAT OTHER PEOPLE COULD HAVE NOTICED. OR THE OPPOSITE, BEING SO FIGETY OR RESTLESS THAT YOU HAVE BEEN MOVING AROUND A LOT MORE THAN USUAL: NEARLY EVERY DAY
10. IF YOU CHECKED OFF ANY PROBLEMS, HOW DIFFICULT HAVE THESE PROBLEMS MADE IT FOR YOU TO DO YOUR WORK, TAKE CARE OF THINGS AT HOME, OR GET ALONG WITH OTHER PEOPLE: VERY DIFFICULT
1. LITTLE INTEREST OR PLEASURE IN DOING THINGS: NEARLY EVERY DAY
CLINICAL INTERPRETATION OF PHQ2 SCORE: MODERATELY SEVERE DEPRESSION

## 2021-02-23 ASSESSMENT — ENCOUNTER SYMPTOMS
COUGH: 0
FEVER: 0
SHORTNESS OF BREATH: 0
FATIGUE: 0
WHEEZING: 0
ABDOMINAL PAIN: 0
CHILLS: 0

## 2021-02-24 ENCOUNTER — TELEPHONE (OUTPATIENT)
Dept: SCHEDULING | Age: 46
End: 2021-02-24

## 2021-02-26 ENCOUNTER — TELEPHONE (OUTPATIENT)
Dept: SCHEDULING | Age: 46
End: 2021-02-26

## 2021-03-02 RX ORDER — IBUPROFEN 800 MG/1
800 TABLET ORAL EVERY 8 HOURS PRN
Qty: 30 TABLET | Refills: 0 | Status: SHIPPED | OUTPATIENT
Start: 2021-03-02 | End: 2021-04-08 | Stop reason: SDUPTHER

## 2021-03-02 RX ORDER — CLONIDINE HYDROCHLORIDE 0.1 MG/1
TABLET ORAL
Qty: 60 TABLET | Refills: 0 | Status: SHIPPED | OUTPATIENT
Start: 2021-03-02 | End: 2021-04-01

## 2021-03-02 RX ORDER — GABAPENTIN 300 MG/1
300 CAPSULE ORAL 2 TIMES DAILY
Qty: 60 CAPSULE | Refills: 0 | Status: SHIPPED | OUTPATIENT
Start: 2021-03-02 | End: 2021-04-01

## 2021-03-02 RX ORDER — TIZANIDINE 4 MG/1
TABLET ORAL
Qty: 60 TABLET | Refills: 0 | Status: SHIPPED | OUTPATIENT
Start: 2021-03-02 | End: 2021-04-01

## 2021-03-02 RX ORDER — TRAMADOL HYDROCHLORIDE 50 MG/1
50 TABLET ORAL EVERY 6 HOURS PRN
Qty: 30 TABLET | Refills: 0 | Status: SHIPPED | OUTPATIENT
Start: 2021-03-02 | End: 2021-04-08 | Stop reason: SDUPTHER

## 2021-03-02 RX ORDER — GABAPENTIN 300 MG/1
CAPSULE ORAL
Qty: 60 CAPSULE | Refills: 0 | Status: SHIPPED | OUTPATIENT
Start: 2021-03-02 | End: 2021-07-06 | Stop reason: SDUPTHER

## 2021-03-02 RX ORDER — AMITRIPTYLINE HYDROCHLORIDE 50 MG/1
50 TABLET, FILM COATED ORAL NIGHTLY
Qty: 30 TABLET | Refills: 1 | Status: SHIPPED | OUTPATIENT
Start: 2021-03-02 | End: 2021-03-31 | Stop reason: SDUPTHER

## 2021-03-03 ENCOUNTER — TELEPHONE (OUTPATIENT)
Dept: HEMATOLOGY/ONCOLOGY | Age: 46
End: 2021-03-03

## 2021-03-03 ENCOUNTER — TELEPHONE (OUTPATIENT)
Dept: SCHEDULING | Age: 46
End: 2021-03-03

## 2021-03-05 ENCOUNTER — APPOINTMENT (OUTPATIENT)
Dept: HEMATOLOGY/ONCOLOGY | Age: 46
End: 2021-03-05

## 2021-03-09 ENCOUNTER — TELEPHONE (OUTPATIENT)
Dept: SCHEDULING | Age: 46
End: 2021-03-09

## 2021-03-19 ENCOUNTER — APPOINTMENT (OUTPATIENT)
Dept: HEMATOLOGY/ONCOLOGY | Age: 46
End: 2021-03-19
Attending: FAMILY MEDICINE

## 2021-03-26 ENCOUNTER — TELEPHONE (OUTPATIENT)
Dept: SCHEDULING | Age: 46
End: 2021-03-26

## 2021-03-31 ENCOUNTER — OFFICE VISIT (OUTPATIENT)
Dept: FAMILY MEDICINE | Age: 46
End: 2021-03-31

## 2021-03-31 VITALS
SYSTOLIC BLOOD PRESSURE: 138 MMHG | WEIGHT: 175.49 LBS | BODY MASS INDEX: 26.6 KG/M2 | TEMPERATURE: 97.8 F | HEIGHT: 68 IN | HEART RATE: 91 BPM | DIASTOLIC BLOOD PRESSURE: 95 MMHG

## 2021-03-31 DIAGNOSIS — F43.10 PTSD (POST-TRAUMATIC STRESS DISORDER): Primary | ICD-10-CM

## 2021-03-31 DIAGNOSIS — Z85.71 HISTORY OF HODGKIN'S LYMPHOMA: ICD-10-CM

## 2021-03-31 DIAGNOSIS — Z51.89: ICD-10-CM

## 2021-03-31 DIAGNOSIS — Z59.00 HOMELESSNESS: ICD-10-CM

## 2021-03-31 PROCEDURE — 99215 OFFICE O/P EST HI 40 MIN: CPT | Performed by: FAMILY MEDICINE

## 2021-03-31 PROCEDURE — 3075F SYST BP GE 130 - 139MM HG: CPT | Performed by: FAMILY MEDICINE

## 2021-03-31 PROCEDURE — 3080F DIAST BP >= 90 MM HG: CPT | Performed by: FAMILY MEDICINE

## 2021-03-31 RX ORDER — AMITRIPTYLINE HYDROCHLORIDE 75 MG/1
75 TABLET ORAL NIGHTLY
Qty: 30 TABLET | Refills: 0 | Status: SHIPPED | OUTPATIENT
Start: 2021-03-31 | End: 2021-04-23 | Stop reason: SDUPTHER

## 2021-03-31 SDOH — ECONOMIC STABILITY - HOUSING INSECURITY: HOMELESSNESS UNSPECIFIED: Z59.00

## 2021-03-31 ASSESSMENT — PATIENT HEALTH QUESTIONNAIRE - PHQ9
SUM OF ALL RESPONSES TO PHQ9 QUESTIONS 1 AND 2: 1
CLINICAL INTERPRETATION OF PHQ9 SCORE: NO FURTHER SCREENING NEEDED
CLINICAL INTERPRETATION OF PHQ2 SCORE: NO FURTHER SCREENING NEEDED
1. LITTLE INTEREST OR PLEASURE IN DOING THINGS: SEVERAL DAYS
2. FEELING DOWN, DEPRESSED OR HOPELESS: NOT AT ALL
SUM OF ALL RESPONSES TO PHQ9 QUESTIONS 1 AND 2: 1

## 2021-04-01 RX ORDER — CLONIDINE HYDROCHLORIDE 0.1 MG/1
TABLET ORAL
Qty: 60 TABLET | Refills: 0 | Status: SHIPPED | OUTPATIENT
Start: 2021-04-01 | End: 2021-04-23 | Stop reason: SDUPTHER

## 2021-04-01 RX ORDER — TIZANIDINE 4 MG/1
TABLET ORAL
Qty: 60 TABLET | Refills: 0 | Status: SHIPPED | OUTPATIENT
Start: 2021-04-01 | End: 2021-04-23 | Stop reason: SDUPTHER

## 2021-04-01 RX ORDER — GABAPENTIN 300 MG/1
CAPSULE ORAL
Qty: 60 CAPSULE | Refills: 0 | Status: SHIPPED | OUTPATIENT
Start: 2021-04-01 | End: 2021-04-23 | Stop reason: SDUPTHER

## 2021-04-01 ASSESSMENT — ENCOUNTER SYMPTOMS
WEAKNESS: 1
ABDOMINAL PAIN: 0
BACK PAIN: 1
FATIGUE: 0
WHEEZING: 0
CHILLS: 0
SHORTNESS OF BREATH: 0
SLEEP DISTURBANCE: 1
CONFUSION: 1
COUGH: 0
FEVER: 0

## 2021-04-06 ENCOUNTER — TELEPHONE (OUTPATIENT)
Dept: SCHEDULING | Age: 46
End: 2021-04-06

## 2021-04-08 RX ORDER — TRAMADOL HYDROCHLORIDE 50 MG/1
50 TABLET ORAL EVERY 6 HOURS PRN
Qty: 30 TABLET | Refills: 0 | Status: SHIPPED | OUTPATIENT
Start: 2021-04-08 | End: 2021-04-23 | Stop reason: SDUPTHER

## 2021-04-08 RX ORDER — IBUPROFEN 800 MG/1
800 TABLET ORAL EVERY 8 HOURS PRN
Qty: 30 TABLET | Refills: 0 | Status: SHIPPED | OUTPATIENT
Start: 2021-04-08 | End: 2021-04-23 | Stop reason: SDUPTHER

## 2021-04-09 ENCOUNTER — OFFICE VISIT (OUTPATIENT)
Dept: HEMATOLOGY/ONCOLOGY | Age: 46
End: 2021-04-09
Attending: FAMILY MEDICINE

## 2021-04-09 ENCOUNTER — NURSE ONLY (OUTPATIENT)
Dept: HEMATOLOGY/ONCOLOGY | Age: 46
End: 2021-04-09

## 2021-04-09 DIAGNOSIS — Z85.71 HISTORY OF HODGKIN'S DISEASE: Primary | ICD-10-CM

## 2021-04-09 DIAGNOSIS — Z85.71 HISTORY OF HODGKIN'S DISEASE: ICD-10-CM

## 2021-04-09 PROCEDURE — 3074F SYST BP LT 130 MM HG: CPT | Performed by: INTERNAL MEDICINE

## 2021-04-09 PROCEDURE — 3078F DIAST BP <80 MM HG: CPT | Performed by: INTERNAL MEDICINE

## 2021-04-09 PROCEDURE — 99203 OFFICE O/P NEW LOW 30 MIN: CPT | Performed by: INTERNAL MEDICINE

## 2021-04-09 PROCEDURE — X1094 NO CHARGE VISIT: HCPCS

## 2021-04-09 ASSESSMENT — PATIENT HEALTH QUESTIONNAIRE - PHQ9
2. FEELING DOWN, DEPRESSED OR HOPELESS: NOT AT ALL
CLINICAL INTERPRETATION OF PHQ9 SCORE: NO FURTHER SCREENING NEEDED
SUM OF ALL RESPONSES TO PHQ9 QUESTIONS 1 AND 2: 0
SUM OF ALL RESPONSES TO PHQ9 QUESTIONS 1 AND 2: 0
CLINICAL INTERPRETATION OF PHQ2 SCORE: NO FURTHER SCREENING NEEDED
1. LITTLE INTEREST OR PLEASURE IN DOING THINGS: NOT AT ALL

## 2021-04-09 ASSESSMENT — ENCOUNTER SYMPTOMS
FEVER: 0
NERVOUS/ANXIOUS: 1
ENDOCRINE NEGATIVE: 1
NEUROLOGICAL NEGATIVE: 1
GASTROINTESTINAL NEGATIVE: 1
ACTIVITY CHANGE: 0
RESPIRATORY NEGATIVE: 1
FATIGUE: 0
APPETITE CHANGE: 0
UNEXPECTED WEIGHT CHANGE: 0
BACK PAIN: 1
HEMATOLOGIC/LYMPHATIC NEGATIVE: 1

## 2021-04-09 ASSESSMENT — PAIN SCALES - GENERAL: PAINLEVEL: 0

## 2021-04-17 ENCOUNTER — TELEPHONE (OUTPATIENT)
Dept: SCHEDULING | Age: 46
End: 2021-04-17

## 2021-04-23 ENCOUNTER — TELEPHONE (OUTPATIENT)
Dept: SCHEDULING | Age: 46
End: 2021-04-23

## 2021-04-23 DIAGNOSIS — F43.10 PTSD (POST-TRAUMATIC STRESS DISORDER): ICD-10-CM

## 2021-04-26 RX ORDER — TRAMADOL HYDROCHLORIDE 50 MG/1
50 TABLET ORAL EVERY 6 HOURS PRN
Qty: 30 TABLET | Refills: 0 | Status: SHIPPED | OUTPATIENT
Start: 2021-04-26 | End: 2021-06-07 | Stop reason: SDUPTHER

## 2021-04-26 RX ORDER — CLONIDINE HYDROCHLORIDE 0.1 MG/1
0.1 TABLET ORAL 2 TIMES DAILY
Qty: 60 TABLET | Refills: 0 | Status: SHIPPED | OUTPATIENT
Start: 2021-04-26 | End: 2021-04-28

## 2021-04-26 RX ORDER — TIZANIDINE 4 MG/1
4 TABLET ORAL 2 TIMES DAILY
Qty: 60 TABLET | Refills: 0 | Status: SHIPPED | OUTPATIENT
Start: 2021-04-26 | End: 2021-04-28

## 2021-04-26 RX ORDER — GABAPENTIN 300 MG/1
300 CAPSULE ORAL 2 TIMES DAILY
Qty: 60 CAPSULE | Refills: 0 | Status: SHIPPED | OUTPATIENT
Start: 2021-04-26 | End: 2021-04-28

## 2021-04-26 RX ORDER — AMITRIPTYLINE HYDROCHLORIDE 75 MG/1
75 TABLET ORAL NIGHTLY
Qty: 30 TABLET | Refills: 0 | Status: SHIPPED | OUTPATIENT
Start: 2021-04-26 | End: 2021-06-08

## 2021-04-26 RX ORDER — IBUPROFEN 800 MG/1
800 TABLET ORAL EVERY 8 HOURS PRN
Qty: 30 TABLET | Refills: 0 | Status: SHIPPED | OUTPATIENT
Start: 2021-04-26 | End: 2021-06-08 | Stop reason: SDUPTHER

## 2021-04-27 ENCOUNTER — TELEPHONE (OUTPATIENT)
Dept: SCHEDULING | Age: 46
End: 2021-04-27

## 2021-04-28 RX ORDER — CLONIDINE HYDROCHLORIDE 0.1 MG/1
TABLET ORAL
Qty: 60 TABLET | Refills: 0 | Status: SHIPPED | OUTPATIENT
Start: 2021-04-28 | End: 2021-06-08 | Stop reason: SDUPTHER

## 2021-04-28 RX ORDER — TIZANIDINE 4 MG/1
TABLET ORAL
Qty: 60 TABLET | Refills: 0 | Status: SHIPPED | OUTPATIENT
Start: 2021-04-28 | End: 2021-06-08 | Stop reason: SDUPTHER

## 2021-04-28 RX ORDER — GABAPENTIN 300 MG/1
CAPSULE ORAL
Qty: 60 CAPSULE | Refills: 0 | Status: SHIPPED | OUTPATIENT
Start: 2021-04-28 | End: 2021-06-08 | Stop reason: SDUPTHER

## 2021-05-21 ENCOUNTER — E-ADVICE (OUTPATIENT)
Dept: INTERNAL MEDICINE | Age: 46
End: 2021-05-21

## 2021-05-21 ENCOUNTER — TELEPHONE (OUTPATIENT)
Dept: INTERNAL MEDICINE | Age: 46
End: 2021-05-21

## 2021-05-26 VITALS
WEIGHT: 176 LBS | TEMPERATURE: 98.2 F | HEART RATE: 79 BPM | DIASTOLIC BLOOD PRESSURE: 73 MMHG | SYSTOLIC BLOOD PRESSURE: 115 MMHG | OXYGEN SATURATION: 100 % | BODY MASS INDEX: 26.76 KG/M2

## 2021-05-28 ENCOUNTER — TELEPHONE (OUTPATIENT)
Dept: SCHEDULING | Age: 46
End: 2021-05-28

## 2021-06-01 RX ORDER — TIZANIDINE 4 MG/1
TABLET ORAL
Qty: 60 TABLET | Refills: 0 | OUTPATIENT
Start: 2021-06-01

## 2021-06-02 ENCOUNTER — TELEPHONE (OUTPATIENT)
Dept: INTERNAL MEDICINE | Age: 46
End: 2021-06-02

## 2021-06-03 ENCOUNTER — APPOINTMENT (OUTPATIENT)
Dept: INTERNAL MEDICINE | Age: 46
End: 2021-06-03

## 2021-06-04 ENCOUNTER — TELEPHONE (OUTPATIENT)
Dept: SCHEDULING | Age: 46
End: 2021-06-04

## 2021-06-07 ENCOUNTER — TELEPHONE (OUTPATIENT)
Dept: SCHEDULING | Age: 46
End: 2021-06-07

## 2021-06-07 ENCOUNTER — APPOINTMENT (OUTPATIENT)
Dept: FAMILY MEDICINE | Age: 46
End: 2021-06-07

## 2021-06-07 DIAGNOSIS — F43.10 PTSD (POST-TRAUMATIC STRESS DISORDER): ICD-10-CM

## 2021-06-07 RX ORDER — CLONIDINE HYDROCHLORIDE 0.1 MG/1
0.1 TABLET ORAL 2 TIMES DAILY
Qty: 60 TABLET | Refills: 0 | Status: CANCELLED | OUTPATIENT
Start: 2021-06-07

## 2021-06-07 RX ORDER — IBUPROFEN 800 MG/1
800 TABLET ORAL EVERY 8 HOURS PRN
Qty: 30 TABLET | Refills: 0 | Status: CANCELLED | OUTPATIENT
Start: 2021-06-07

## 2021-06-07 RX ORDER — IBUPROFEN 800 MG/1
800 TABLET ORAL EVERY 8 HOURS PRN
Qty: 30 TABLET | Refills: 0 | OUTPATIENT
Start: 2021-06-07

## 2021-06-07 RX ORDER — TRAMADOL HYDROCHLORIDE 50 MG/1
50 TABLET ORAL EVERY 6 HOURS PRN
Qty: 30 TABLET | Refills: 0 | OUTPATIENT
Start: 2021-06-07

## 2021-06-07 RX ORDER — AMITRIPTYLINE HYDROCHLORIDE 75 MG/1
75 TABLET ORAL NIGHTLY
Qty: 30 TABLET | Refills: 0 | OUTPATIENT
Start: 2021-06-07

## 2021-06-07 RX ORDER — CLONIDINE HYDROCHLORIDE 0.1 MG/1
0.1 TABLET ORAL 2 TIMES DAILY
Qty: 60 TABLET | Refills: 0 | OUTPATIENT
Start: 2021-06-07

## 2021-06-08 ENCOUNTER — TELEPHONE (OUTPATIENT)
Dept: SCHEDULING | Age: 46
End: 2021-06-08

## 2021-06-08 DIAGNOSIS — F43.10 PTSD (POST-TRAUMATIC STRESS DISORDER): ICD-10-CM

## 2021-06-08 RX ORDER — TIZANIDINE 4 MG/1
4 TABLET ORAL 2 TIMES DAILY
Qty: 60 TABLET | Refills: 0 | Status: SHIPPED | OUTPATIENT
Start: 2021-06-08 | End: 2021-07-06 | Stop reason: SDUPTHER

## 2021-06-08 RX ORDER — IBUPROFEN 800 MG/1
800 TABLET ORAL EVERY 8 HOURS PRN
Qty: 30 TABLET | Refills: 0 | Status: SHIPPED | OUTPATIENT
Start: 2021-06-08 | End: 2021-07-06 | Stop reason: SDUPTHER

## 2021-06-08 RX ORDER — TRAMADOL HYDROCHLORIDE 50 MG/1
50 TABLET ORAL EVERY 6 HOURS PRN
Qty: 30 TABLET | Refills: 1 | Status: SHIPPED | OUTPATIENT
Start: 2021-06-08 | End: 2021-07-06 | Stop reason: SDUPTHER

## 2021-06-08 RX ORDER — CLONIDINE HYDROCHLORIDE 0.1 MG/1
0.1 TABLET ORAL 2 TIMES DAILY
Qty: 60 TABLET | Refills: 0 | Status: SHIPPED | OUTPATIENT
Start: 2021-06-08 | End: 2021-07-06 | Stop reason: SDUPTHER

## 2021-06-08 RX ORDER — GABAPENTIN 300 MG/1
300 CAPSULE ORAL 2 TIMES DAILY
Qty: 60 CAPSULE | Refills: 0 | Status: SHIPPED | OUTPATIENT
Start: 2021-06-08 | End: 2021-07-06 | Stop reason: SDUPTHER

## 2021-06-08 RX ORDER — AMITRIPTYLINE HYDROCHLORIDE 75 MG/1
TABLET ORAL
Qty: 30 TABLET | Refills: 0 | Status: SHIPPED | OUTPATIENT
Start: 2021-06-08 | End: 2021-07-06 | Stop reason: SDUPTHER

## 2021-06-09 RX ORDER — AMITRIPTYLINE HYDROCHLORIDE 75 MG/1
75 TABLET ORAL NIGHTLY
Qty: 30 TABLET | Refills: 0 | OUTPATIENT
Start: 2021-06-09

## 2021-07-06 ENCOUNTER — OFFICE VISIT (OUTPATIENT)
Dept: INTERNAL MEDICINE | Age: 46
End: 2021-07-06

## 2021-07-06 VITALS
SYSTOLIC BLOOD PRESSURE: 124 MMHG | BODY MASS INDEX: 27.28 KG/M2 | HEIGHT: 68 IN | TEMPERATURE: 97.7 F | DIASTOLIC BLOOD PRESSURE: 100 MMHG | OXYGEN SATURATION: 97 % | HEART RATE: 89 BPM | WEIGHT: 180 LBS

## 2021-07-06 DIAGNOSIS — F43.10 PTSD (POST-TRAUMATIC STRESS DISORDER): ICD-10-CM

## 2021-07-06 DIAGNOSIS — F41.9 ANXIETY: ICD-10-CM

## 2021-07-06 DIAGNOSIS — R29.898 LEFT LEG WEAKNESS: ICD-10-CM

## 2021-07-06 DIAGNOSIS — Z85.71 H/O HODGKIN'S LYMPHOMA: ICD-10-CM

## 2021-07-06 DIAGNOSIS — F51.01 PRIMARY INSOMNIA: ICD-10-CM

## 2021-07-06 DIAGNOSIS — I10 ESSENTIAL HYPERTENSION: Primary | ICD-10-CM

## 2021-07-06 PROCEDURE — 3074F SYST BP LT 130 MM HG: CPT | Performed by: INTERNAL MEDICINE

## 2021-07-06 PROCEDURE — 99204 OFFICE O/P NEW MOD 45 MIN: CPT | Performed by: INTERNAL MEDICINE

## 2021-07-06 PROCEDURE — 3080F DIAST BP >= 90 MM HG: CPT | Performed by: INTERNAL MEDICINE

## 2021-07-06 RX ORDER — IBUPROFEN 800 MG/1
800 TABLET ORAL EVERY 8 HOURS PRN
Qty: 30 TABLET | Refills: 3 | Status: SHIPPED | OUTPATIENT
Start: 2021-07-06 | End: 2021-08-23 | Stop reason: SDUPTHER

## 2021-07-06 RX ORDER — CLONIDINE HYDROCHLORIDE 0.1 MG/1
0.1 TABLET ORAL 2 TIMES DAILY
Qty: 60 TABLET | Refills: 3 | Status: SHIPPED | OUTPATIENT
Start: 2021-07-06 | End: 2021-11-01

## 2021-07-06 RX ORDER — ONDANSETRON 8 MG/1
8 TABLET, ORALLY DISINTEGRATING ORAL EVERY 8 HOURS PRN
COMMUNITY
End: 2021-07-06 | Stop reason: ALTCHOICE

## 2021-07-06 RX ORDER — TIZANIDINE 4 MG/1
4 TABLET ORAL 2 TIMES DAILY
Qty: 60 TABLET | Refills: 3 | Status: SHIPPED | OUTPATIENT
Start: 2021-07-06 | End: 2021-12-03 | Stop reason: SDUPTHER

## 2021-07-06 RX ORDER — TRAMADOL HYDROCHLORIDE 50 MG/1
50 TABLET ORAL EVERY 6 HOURS PRN
Qty: 30 TABLET | Refills: 3 | Status: SHIPPED | OUTPATIENT
Start: 2021-07-06 | End: 2021-11-15

## 2021-07-06 RX ORDER — GABAPENTIN 300 MG/1
300 CAPSULE ORAL 2 TIMES DAILY
Qty: 60 CAPSULE | Refills: 3 | Status: SHIPPED | OUTPATIENT
Start: 2021-07-06 | End: 2021-11-11 | Stop reason: SDUPTHER

## 2021-07-06 RX ORDER — AMITRIPTYLINE HYDROCHLORIDE 75 MG/1
75 TABLET ORAL NIGHTLY
Qty: 30 TABLET | Refills: 3 | Status: SHIPPED | OUTPATIENT
Start: 2021-07-06 | End: 2021-11-01

## 2021-07-06 ASSESSMENT — PATIENT HEALTH QUESTIONNAIRE - PHQ9
10. IF YOU CHECKED OFF ANY PROBLEMS, HOW DIFFICULT HAVE THESE PROBLEMS MADE IT FOR YOU TO DO YOUR WORK, TAKE CARE OF THINGS AT HOME, OR GET ALONG WITH OTHER PEOPLE: NOT DIFFICULT AT ALL
CLINICAL INTERPRETATION OF PHQ2 SCORE: FURTHER SCREENING NEEDED
7. TROUBLE CONCENTRATING ON THINGS, SUCH AS READING THE NEWSPAPER OR WATCHING TELEVISION: NOT AT ALL
SUM OF ALL RESPONSES TO PHQ9 QUESTIONS 1 AND 2: 3
SUM OF ALL RESPONSES TO PHQ QUESTIONS 1-9: 7
CLINICAL INTERPRETATION OF PHQ2 SCORE: MILD DEPRESSION
CLINICAL INTERPRETATION OF PHQ9 SCORE: FURTHER SCREENING NEEDED
1. LITTLE INTEREST OR PLEASURE IN DOING THINGS: NOT AT ALL
2. FEELING DOWN, DEPRESSED OR HOPELESS: NEARLY EVERY DAY
SUM OF ALL RESPONSES TO PHQ9 QUESTIONS 1 TO 9: 7
8. MOVING OR SPEAKING SO SLOWLY THAT OTHER PEOPLE COULD HAVE NOTICED. OR THE OPPOSITE, BEING SO FIGETY OR RESTLESS THAT YOU HAVE BEEN MOVING AROUND A LOT MORE THAN USUAL: NOT AT ALL
CLINICAL INTERPRETATION OF PHQ9 SCORE: MILD DEPRESSION
6. FEELING BAD ABOUT YOURSELF - OR THAT YOU ARE A FAILURE OR HAVE LET YOURSELF OR YOUR FAMILY DOWN: SEVERAL DAYS
4. FEELING TIRED OR HAVING LITTLE ENERGY: NEARLY EVERY DAY
3. TROUBLE FALLING OR STAYING ASLEEP OR SLEEPING TOO MUCH: NOT AT ALL
SUM OF ALL RESPONSES TO PHQ9 QUESTIONS 1 AND 2: 3
5. POOR APPETITE, WEIGHT LOSS, OR OVEREATING: NOT AT ALL
9. THOUGHTS THAT YOU WOULD BE BETTER OFF DEAD, OR OF HURTING YOURSELF: NOT AT ALL

## 2021-07-07 ENCOUNTER — TELEPHONE (OUTPATIENT)
Dept: ORTHOPEDICS | Age: 46
End: 2021-07-07

## 2021-07-07 DIAGNOSIS — M54.40 CHRONIC BILATERAL LOW BACK PAIN WITH SCIATICA, SCIATICA LATERALITY UNSPECIFIED: Primary | ICD-10-CM

## 2021-07-07 DIAGNOSIS — M25.562 ACUTE PAIN OF LEFT KNEE: ICD-10-CM

## 2021-07-07 DIAGNOSIS — G89.29 CHRONIC BILATERAL LOW BACK PAIN WITH SCIATICA, SCIATICA LATERALITY UNSPECIFIED: Primary | ICD-10-CM

## 2021-07-07 DIAGNOSIS — R26.9 GAIT ABNORMALITY: ICD-10-CM

## 2021-07-21 ENCOUNTER — HOSPITAL ENCOUNTER (OUTPATIENT)
Dept: CT IMAGING | Age: 46
End: 2021-07-21
Attending: INTERNAL MEDICINE

## 2021-07-21 ASSESSMENT — ENCOUNTER SYMPTOMS
SLEEP DISTURBANCE: 0
CHEST TIGHTNESS: 0
BACK PAIN: 0
RHINORRHEA: 0
EYE PAIN: 0
CONSTIPATION: 0
SEIZURES: 0
ABDOMINAL PAIN: 0
DIZZINESS: 0
AGITATION: 0
FEVER: 0
SORE THROAT: 0
SPEECH DIFFICULTY: 0
COUGH: 0
EYE ITCHING: 0
NERVOUS/ANXIOUS: 0
CHILLS: 0
APPETITE CHANGE: 0
EYE REDNESS: 0
DIARRHEA: 0
ACTIVITY CHANGE: 0
SINUS PRESSURE: 0
APNEA: 0
FATIGUE: 0
HEADACHES: 0
NAUSEA: 0
WOUND: 0
NUMBNESS: 0
SINUS PAIN: 0
VOMITING: 0

## 2021-07-23 ENCOUNTER — APPOINTMENT (OUTPATIENT)
Dept: SPORTS MEDICINE | Age: 46
End: 2021-07-23
Attending: INTERNAL MEDICINE

## 2021-07-23 ENCOUNTER — TELEPHONE (OUTPATIENT)
Dept: PAIN MANAGEMENT | Age: 46
End: 2021-07-23

## 2021-07-30 ENCOUNTER — APPOINTMENT (OUTPATIENT)
Dept: SPORTS MEDICINE | Age: 46
End: 2021-07-30
Attending: INTERNAL MEDICINE

## 2021-08-05 ASSESSMENT — ENCOUNTER SYMPTOMS
ADENOPATHY: 0
NAUSEA: 0
WHEEZING: 0
PHOTOPHOBIA: 0
BRUISES/BLEEDS EASILY: 0
WEAKNESS: 0
DIARRHEA: 0
CHILLS: 0
WOUND: 0
VOMITING: 0
SHORTNESS OF BREATH: 0
FEVER: 0
COLOR CHANGE: 0
ACTIVITY CHANGE: 0
CHEST TIGHTNESS: 0

## 2021-08-06 ENCOUNTER — IMAGING SERVICES (OUTPATIENT)
Dept: GENERAL RADIOLOGY | Age: 46
End: 2021-08-06
Attending: FAMILY MEDICINE

## 2021-08-06 ENCOUNTER — OFFICE VISIT (OUTPATIENT)
Dept: SPORTS MEDICINE | Age: 46
End: 2021-08-06
Attending: INTERNAL MEDICINE

## 2021-08-06 ENCOUNTER — IMAGING SERVICES (OUTPATIENT)
Dept: GENERAL RADIOLOGY | Age: 46
End: 2021-08-06
Attending: INTERNAL MEDICINE

## 2021-08-06 VITALS
DIASTOLIC BLOOD PRESSURE: 86 MMHG | HEIGHT: 68 IN | WEIGHT: 182 LBS | SYSTOLIC BLOOD PRESSURE: 124 MMHG | BODY MASS INDEX: 27.58 KG/M2

## 2021-08-06 DIAGNOSIS — Z85.71 H/O HODGKIN'S LYMPHOMA: ICD-10-CM

## 2021-08-06 DIAGNOSIS — M54.16 LUMBAR RADICULOPATHY: ICD-10-CM

## 2021-08-06 DIAGNOSIS — I10 ESSENTIAL HYPERTENSION: ICD-10-CM

## 2021-08-06 DIAGNOSIS — G89.29 CHRONIC LEFT-SIDED LOW BACK PAIN WITH LEFT-SIDED SCIATICA: ICD-10-CM

## 2021-08-06 DIAGNOSIS — M25.562 LEFT KNEE PAIN, UNSPECIFIED CHRONICITY: ICD-10-CM

## 2021-08-06 DIAGNOSIS — M54.42 CHRONIC LEFT-SIDED LOW BACK PAIN WITH LEFT-SIDED SCIATICA: ICD-10-CM

## 2021-08-06 DIAGNOSIS — R29.898 LEFT LEG WEAKNESS: ICD-10-CM

## 2021-08-06 DIAGNOSIS — R53.81 PHYSICAL DECONDITIONING: ICD-10-CM

## 2021-08-06 DIAGNOSIS — R52 REFERRED PAIN: ICD-10-CM

## 2021-08-06 DIAGNOSIS — M54.16 LUMBAR RADICULOPATHY: Primary | ICD-10-CM

## 2021-08-06 PROCEDURE — 72110 X-RAY EXAM L-2 SPINE 4/>VWS: CPT | Performed by: RADIOLOGY

## 2021-08-06 PROCEDURE — 73564 X-RAY EXAM KNEE 4 OR MORE: CPT | Performed by: RADIOLOGY

## 2021-08-06 PROCEDURE — 99203 OFFICE O/P NEW LOW 30 MIN: CPT | Performed by: FAMILY MEDICINE

## 2021-08-06 PROCEDURE — 3074F SYST BP LT 130 MM HG: CPT | Performed by: FAMILY MEDICINE

## 2021-08-06 PROCEDURE — 3079F DIAST BP 80-89 MM HG: CPT | Performed by: FAMILY MEDICINE

## 2021-08-06 ASSESSMENT — ENCOUNTER SYMPTOMS
NUMBNESS: 1
BACK PAIN: 1

## 2021-08-23 ENCOUNTER — TELEPHONE (OUTPATIENT)
Dept: SPORTS MEDICINE | Age: 46
End: 2021-08-23

## 2021-08-23 DIAGNOSIS — Z85.71 H/O HODGKIN'S LYMPHOMA: Primary | ICD-10-CM

## 2021-08-24 RX ORDER — IBUPROFEN 800 MG/1
800 TABLET ORAL EVERY 8 HOURS PRN
Qty: 30 TABLET | Refills: 3 | Status: SHIPPED | OUTPATIENT
Start: 2021-08-24 | End: 2021-10-18

## 2021-08-30 ENCOUNTER — TELEPHONE (OUTPATIENT)
Dept: INTERNAL MEDICINE | Age: 46
End: 2021-08-30

## 2021-08-30 RX ORDER — AMOXICILLIN AND CLAVULANATE POTASSIUM 875; 125 MG/1; MG/1
1 TABLET, FILM COATED ORAL 2 TIMES DAILY
Status: CANCELLED | OUTPATIENT
Start: 2021-08-30

## 2021-08-31 ENCOUNTER — TELEPHONE (OUTPATIENT)
Dept: INFUSION THERAPY | Age: 46
End: 2021-08-31

## 2021-08-31 DIAGNOSIS — Z85.71 H/O HODGKIN'S LYMPHOMA: Primary | ICD-10-CM

## 2021-08-31 RX ORDER — TIZANIDINE 4 MG/1
TABLET ORAL
Qty: 60 TABLET | Refills: 3 | OUTPATIENT
Start: 2021-08-31

## 2021-09-07 ENCOUNTER — APPOINTMENT (OUTPATIENT)
Dept: PAIN MANAGEMENT | Age: 46
End: 2021-09-07

## 2021-09-29 ENCOUNTER — APPOINTMENT (OUTPATIENT)
Dept: PAIN MANAGEMENT | Age: 46
End: 2021-09-29
Attending: FAMILY MEDICINE

## 2021-10-07 ENCOUNTER — TELEPHONE (OUTPATIENT)
Dept: INTERNAL MEDICINE | Age: 46
End: 2021-10-07

## 2021-10-07 ENCOUNTER — APPOINTMENT (OUTPATIENT)
Dept: INTERNAL MEDICINE | Age: 46
End: 2021-10-07

## 2021-10-08 ENCOUNTER — APPOINTMENT (OUTPATIENT)
Dept: HEMATOLOGY/ONCOLOGY | Age: 46
End: 2021-10-08

## 2021-10-08 ENCOUNTER — APPOINTMENT (OUTPATIENT)
Dept: INTERNAL MEDICINE | Age: 46
End: 2021-10-08

## 2021-10-08 ENCOUNTER — TELEPHONE (OUTPATIENT)
Dept: INTERNAL MEDICINE | Age: 46
End: 2021-10-08

## 2021-10-15 ENCOUNTER — APPOINTMENT (OUTPATIENT)
Dept: HEMATOLOGY/ONCOLOGY | Age: 46
End: 2021-10-15

## 2021-10-18 RX ORDER — IBUPROFEN 800 MG/1
800 TABLET ORAL EVERY 8 HOURS PRN
Qty: 30 TABLET | Refills: 3 | Status: SHIPPED | OUTPATIENT
Start: 2021-10-18 | End: 2022-01-03 | Stop reason: SDUPTHER

## 2021-10-21 ENCOUNTER — APPOINTMENT (OUTPATIENT)
Dept: PAIN MANAGEMENT | Age: 46
End: 2021-10-21
Attending: FAMILY MEDICINE

## 2021-11-01 RX ORDER — AMITRIPTYLINE HYDROCHLORIDE 75 MG/1
TABLET ORAL
Qty: 30 TABLET | Refills: 3 | Status: SHIPPED | OUTPATIENT
Start: 2021-11-01 | End: 2022-02-01 | Stop reason: SDUPTHER

## 2021-11-01 RX ORDER — CLONIDINE HYDROCHLORIDE 0.1 MG/1
TABLET ORAL
Qty: 60 TABLET | Refills: 3 | Status: SHIPPED | OUTPATIENT
Start: 2021-11-01 | End: 2022-02-03 | Stop reason: SDUPTHER

## 2021-11-11 DIAGNOSIS — R29.898 LEFT LEG WEAKNESS: Primary | ICD-10-CM

## 2021-11-11 DIAGNOSIS — Z85.71 H/O HODGKIN'S LYMPHOMA: ICD-10-CM

## 2021-11-15 RX ORDER — TRAMADOL HYDROCHLORIDE 50 MG/1
50 TABLET ORAL EVERY 6 HOURS PRN
Qty: 30 TABLET | Refills: 3 | Status: SHIPPED | OUTPATIENT
Start: 2021-11-15 | End: 2022-02-03 | Stop reason: SDUPTHER

## 2021-11-15 RX ORDER — GABAPENTIN 300 MG/1
300 CAPSULE ORAL 2 TIMES DAILY
Qty: 60 CAPSULE | Refills: 3 | Status: SHIPPED | OUTPATIENT
Start: 2021-11-15 | End: 2022-02-03 | Stop reason: SDUPTHER

## 2021-11-17 ENCOUNTER — APPOINTMENT (OUTPATIENT)
Dept: PAIN MANAGEMENT | Age: 46
End: 2021-11-17
Attending: FAMILY MEDICINE

## 2021-11-19 ENCOUNTER — APPOINTMENT (OUTPATIENT)
Dept: INTERNAL MEDICINE | Age: 46
End: 2021-11-19

## 2021-11-26 ENCOUNTER — APPOINTMENT (OUTPATIENT)
Dept: INTERNAL MEDICINE | Age: 46
End: 2021-11-26

## 2021-11-30 ENCOUNTER — TELEPHONE (OUTPATIENT)
Dept: INTERNAL MEDICINE | Age: 46
End: 2021-11-30

## 2021-11-30 RX ORDER — IBUPROFEN 800 MG/1
800 TABLET ORAL EVERY 8 HOURS PRN
Qty: 30 TABLET | Refills: 3 | OUTPATIENT
Start: 2021-11-30

## 2021-12-03 RX ORDER — TIZANIDINE 4 MG/1
4 TABLET ORAL 2 TIMES DAILY
Qty: 60 TABLET | Refills: 3 | Status: SHIPPED | OUTPATIENT
Start: 2021-12-03 | End: 2022-02-03 | Stop reason: SDUPTHER

## 2021-12-06 DIAGNOSIS — R29.898 LEFT LEG WEAKNESS: ICD-10-CM

## 2021-12-06 DIAGNOSIS — Z85.71 H/O HODGKIN'S LYMPHOMA: ICD-10-CM

## 2021-12-07 RX ORDER — GABAPENTIN 300 MG/1
300 CAPSULE ORAL 2 TIMES DAILY
Qty: 60 CAPSULE | Refills: 3 | OUTPATIENT
Start: 2021-12-07

## 2021-12-07 RX ORDER — TRAMADOL HYDROCHLORIDE 50 MG/1
50 TABLET ORAL EVERY 6 HOURS PRN
Qty: 30 TABLET | Refills: 3 | OUTPATIENT
Start: 2021-12-07

## 2021-12-16 ENCOUNTER — OFFICE VISIT (OUTPATIENT)
Dept: PAIN MANAGEMENT | Age: 46
End: 2021-12-16
Attending: FAMILY MEDICINE

## 2021-12-16 VITALS — HEIGHT: 68 IN | BODY MASS INDEX: 27.58 KG/M2 | WEIGHT: 182 LBS

## 2021-12-16 DIAGNOSIS — G89.29 CHRONIC BILATERAL LOW BACK PAIN WITHOUT SCIATICA: Primary | ICD-10-CM

## 2021-12-16 DIAGNOSIS — G89.4 CHRONIC PAIN SYNDROME: ICD-10-CM

## 2021-12-16 DIAGNOSIS — M53.3 SI (SACROILIAC) JOINT DYSFUNCTION: ICD-10-CM

## 2021-12-16 DIAGNOSIS — M54.50 CHRONIC BILATERAL LOW BACK PAIN WITHOUT SCIATICA: Primary | ICD-10-CM

## 2021-12-16 DIAGNOSIS — M47.816 FACET SYNDROME, LUMBAR: ICD-10-CM

## 2021-12-16 PROCEDURE — 99203 OFFICE O/P NEW LOW 30 MIN: CPT | Performed by: PHYSICAL MEDICINE & REHABILITATION

## 2021-12-29 ENCOUNTER — LAB SERVICES (OUTPATIENT)
Dept: LAB | Age: 46
End: 2021-12-29

## 2021-12-29 ENCOUNTER — OFFICE VISIT (OUTPATIENT)
Dept: FAMILY MEDICINE | Age: 46
End: 2021-12-29

## 2021-12-29 VITALS
HEIGHT: 68 IN | HEART RATE: 92 BPM | BODY MASS INDEX: 28.34 KG/M2 | WEIGHT: 187 LBS | OXYGEN SATURATION: 98 % | DIASTOLIC BLOOD PRESSURE: 84 MMHG | TEMPERATURE: 98.4 F | SYSTOLIC BLOOD PRESSURE: 114 MMHG

## 2021-12-29 DIAGNOSIS — Z85.71 H/O HODGKIN'S LYMPHOMA: ICD-10-CM

## 2021-12-29 DIAGNOSIS — F43.10 PTSD (POST-TRAUMATIC STRESS DISORDER): ICD-10-CM

## 2021-12-29 DIAGNOSIS — I10 ESSENTIAL HYPERTENSION: ICD-10-CM

## 2021-12-29 DIAGNOSIS — Z00.00 LABORATORY EXAMINATION ORDERED AS PART OF A ROUTINE GENERAL MEDICAL EXAMINATION: ICD-10-CM

## 2021-12-29 DIAGNOSIS — G47.00 INSOMNIA, UNSPECIFIED TYPE: ICD-10-CM

## 2021-12-29 DIAGNOSIS — F90.9 ATTENTION DEFICIT HYPERACTIVITY DISORDER (ADHD), UNSPECIFIED ADHD TYPE: ICD-10-CM

## 2021-12-29 DIAGNOSIS — M54.9 BACK PAIN, UNSPECIFIED BACK LOCATION, UNSPECIFIED BACK PAIN LATERALITY, UNSPECIFIED CHRONICITY: ICD-10-CM

## 2021-12-29 DIAGNOSIS — M79.604 PAIN IN BOTH LOWER EXTREMITIES: ICD-10-CM

## 2021-12-29 DIAGNOSIS — Z85.71 H/O HODGKIN'S LYMPHOMA: Primary | ICD-10-CM

## 2021-12-29 DIAGNOSIS — M79.605 PAIN IN BOTH LOWER EXTREMITIES: ICD-10-CM

## 2021-12-29 LAB
ALBUMIN SERPL-MCNC: 4.7 G/DL (ref 3.6–5.1)
ALP SERPL-CCNC: 171 U/L (ref 45–115)
ALT SERPL W/O P-5'-P-CCNC: 136 U/L (ref 5–49)
AST SERPL-CCNC: 71 U/L (ref 14–43)
BASOPHIL %: 1.2 % (ref 0–1.2)
BASOPHIL ABSOLUTE #: 0.1 10*3/UL (ref 0–0.1)
BILIRUB SERPL-MCNC: 0.4 MG/DL (ref 0–1.3)
BUN SERPL-MCNC: 12 MG/DL (ref 6–27)
CALCIUM SERPL-MCNC: 10.4 MG/DL (ref 8.6–10.6)
CHLORIDE SERPL-SCNC: 101 MMOL/L (ref 96–107)
CHOLEST SERPL-MCNC: 309 MG/DL (ref 140–200)
CHOLEST/HDLC SERPL: 8.8 {RATIO}
CO2 SERPL-SCNC: 29 MMOL/L (ref 22–32)
CREAT SERPL-MCNC: 1.4 MG/DL (ref 0.6–1.6)
DIFFERENTIAL TYPE: ABNORMAL
EOSINOPHIL %: 2.5 % (ref 0–10)
EOSINOPHIL ABSOLUTE #: 0.2 10*3/UL (ref 0–0.5)
GFR SERPL CREATININE-BSD FRML MDRD: 54 ML/MIN/{1.73M2}
GFR SERPL CREATININE-BSD FRML MDRD: >60 ML/MIN/{1.73M2}
GLUCOSE SERPL-MCNC: 105 MG/DL (ref 70–200)
HDLC SERPL-MCNC: 35 MG/DL
HEMATOCRIT: 43.4 % (ref 40–51)
HEMOGLOBIN: 14.8 G/DL (ref 13.7–17.5)
IMMATURE GRANULOCYTE ABSOLUTE: 0.05 10*3/UL (ref 0–0.05)
IMMATURE GRANULOCYTE PERCENT: 0.6 % (ref 0–0.5)
LYMPH PERCENT: 44.7 % (ref 20.5–51.1)
LYMPHOCYTE ABSOLUTE #: 3.8 10*3/UL (ref 1.2–3.4)
MEAN CORPUSCULAR HGB CONCENTRATION: 34.1 % (ref 32–36)
MEAN CORPUSCULAR HGB: 31.8 PG (ref 27–34)
MEAN CORPUSCULAR VOLUME: 93.1 FL (ref 79–95)
MEAN PLATELET VOLUME: 9 FL (ref 8.6–12.4)
MONOCYTE ABSOLUTE #: 0.5 10*3/UL (ref 0.2–0.9)
MONOCYTE PERCENT: 6.3 % (ref 4.3–12.9)
NEUTROPHIL ABSOLUTE #: 3.8 10*3/UL (ref 1.4–6.5)
NEUTROPHIL PERCENT: 44.7 % (ref 34–73.5)
NONHDLC SERPL-MCNC: 274 MG/DL (ref 0–150)
PLATELET COUNT: 266 10*3/UL (ref 150–400)
POTASSIUM SERPL-SCNC: 4.6 MMOL/L (ref 3.5–5.3)
PROT SERPL-MCNC: 7.9 G/DL (ref 6.4–8.5)
RED BLOOD CELL COUNT: 4.66 10*6/UL (ref 3.9–5.7)
RED CELL DISTRIBUTION WIDTH: 13 % (ref 11.3–14.8)
SODIUM SERPL-SCNC: 135 MMOL/L (ref 136–146)
T4 FREE SERPL-MCNC: 0.92 NG/DL (ref 0.78–2.19)
TSH SERPL DL<=0.05 MIU/L-ACNC: 6.4 M[IU]/L (ref 0.3–4.82)
VIT B12 SERPL-MCNC: 657 PG/ML (ref 193–982)
WHITE BLOOD CELL COUNT: 8.5 10*3/UL (ref 4–10)

## 2021-12-29 PROCEDURE — 82607 VITAMIN B-12: CPT | Performed by: PHYSICIAN ASSISTANT

## 2021-12-29 PROCEDURE — 82465 ASSAY BLD/SERUM CHOLESTEROL: CPT | Performed by: PHYSICIAN ASSISTANT

## 2021-12-29 PROCEDURE — 83718 ASSAY OF LIPOPROTEIN: CPT | Performed by: PHYSICIAN ASSISTANT

## 2021-12-29 PROCEDURE — 36415 COLL VENOUS BLD VENIPUNCTURE: CPT | Performed by: PHYSICIAN ASSISTANT

## 2021-12-29 PROCEDURE — 3074F SYST BP LT 130 MM HG: CPT | Performed by: PHYSICIAN ASSISTANT

## 2021-12-29 PROCEDURE — 3079F DIAST BP 80-89 MM HG: CPT | Performed by: PHYSICIAN ASSISTANT

## 2021-12-29 PROCEDURE — 84439 ASSAY OF FREE THYROXINE: CPT | Performed by: PHYSICIAN ASSISTANT

## 2021-12-29 PROCEDURE — 99215 OFFICE O/P EST HI 40 MIN: CPT | Performed by: PHYSICIAN ASSISTANT

## 2021-12-29 PROCEDURE — 80050 GENERAL HEALTH PANEL: CPT | Performed by: PHYSICIAN ASSISTANT

## 2021-12-29 ASSESSMENT — ENCOUNTER SYMPTOMS
CHILLS: 0
SLEEP DISTURBANCE: 1
SHORTNESS OF BREATH: 0
SORE THROAT: 0
CONSTIPATION: 0
WHEEZING: 0
COUGH: 0
BACK PAIN: 1
ABDOMINAL PAIN: 0
DIARRHEA: 0
WEAKNESS: 1
NAUSEA: 0
VOMITING: 0
POLYDIPSIA: 0
FEVER: 0
FATIGUE: 0

## 2021-12-30 RX ORDER — IBUPROFEN 800 MG/1
800 TABLET ORAL EVERY 8 HOURS PRN
Qty: 30 TABLET | Refills: 3 | OUTPATIENT
Start: 2021-12-30

## 2022-01-05 ENCOUNTER — APPOINTMENT (OUTPATIENT)
Dept: MRI IMAGING | Age: 47
End: 2022-01-05
Attending: PHYSICAL MEDICINE & REHABILITATION

## 2022-01-05 DIAGNOSIS — E78.00 ELEVATED CHOLESTEROL: ICD-10-CM

## 2022-01-05 DIAGNOSIS — R79.89 ELEVATED TSH: Primary | ICD-10-CM

## 2022-01-05 DIAGNOSIS — E03.9 HYPOTHYROIDISM, UNSPECIFIED TYPE: ICD-10-CM

## 2022-01-05 DIAGNOSIS — R74.8 ELEVATED LIVER ENZYMES: ICD-10-CM

## 2022-01-05 RX ORDER — IBUPROFEN 800 MG/1
800 TABLET ORAL EVERY 8 HOURS PRN
Qty: 30 TABLET | Refills: 3 | Status: SHIPPED | OUTPATIENT
Start: 2022-01-05 | End: 2022-01-17

## 2022-01-06 RX ORDER — ATORVASTATIN CALCIUM 10 MG/1
10 TABLET, FILM COATED ORAL DAILY
Qty: 30 TABLET | Refills: 3 | Status: SHIPPED | OUTPATIENT
Start: 2022-01-06 | End: 2022-02-03 | Stop reason: SDUPTHER

## 2022-01-14 ENCOUNTER — APPOINTMENT (OUTPATIENT)
Dept: ULTRASOUND IMAGING | Age: 47
End: 2022-01-14
Attending: PHYSICIAN ASSISTANT

## 2022-01-15 ENCOUNTER — IMAGING SERVICES (OUTPATIENT)
Dept: ULTRASOUND IMAGING | Age: 47
End: 2022-01-15
Attending: PHYSICIAN ASSISTANT

## 2022-01-15 DIAGNOSIS — R74.8 ELEVATED LIVER ENZYMES: ICD-10-CM

## 2022-01-15 PROCEDURE — 76705 ECHO EXAM OF ABDOMEN: CPT | Performed by: RADIOLOGY

## 2022-01-17 RX ORDER — IBUPROFEN 800 MG/1
800 TABLET ORAL EVERY 8 HOURS PRN
Qty: 30 TABLET | Refills: 3 | Status: SHIPPED | OUTPATIENT
Start: 2022-01-17 | End: 2022-03-21

## 2022-01-20 ENCOUNTER — APPOINTMENT (OUTPATIENT)
Dept: MRI IMAGING | Age: 47
End: 2022-01-20
Attending: PHYSICAL MEDICINE & REHABILITATION

## 2022-01-20 ENCOUNTER — TELEPHONE (OUTPATIENT)
Dept: SCHEDULING | Age: 47
End: 2022-01-20

## 2022-01-29 ENCOUNTER — IMAGING SERVICES (OUTPATIENT)
Dept: MRI IMAGING | Age: 47
End: 2022-01-29
Attending: PHYSICAL MEDICINE & REHABILITATION

## 2022-01-29 DIAGNOSIS — G89.29 CHRONIC BILATERAL LOW BACK PAIN WITHOUT SCIATICA: ICD-10-CM

## 2022-01-29 DIAGNOSIS — M47.816 FACET SYNDROME, LUMBAR: ICD-10-CM

## 2022-01-29 DIAGNOSIS — M54.50 CHRONIC BILATERAL LOW BACK PAIN WITHOUT SCIATICA: ICD-10-CM

## 2022-01-29 DIAGNOSIS — M53.3 SI (SACROILIAC) JOINT DYSFUNCTION: ICD-10-CM

## 2022-01-29 DIAGNOSIS — G89.4 CHRONIC PAIN SYNDROME: ICD-10-CM

## 2022-01-29 PROCEDURE — 72148 MRI LUMBAR SPINE W/O DYE: CPT | Performed by: RADIOLOGY

## 2022-01-29 PROCEDURE — G1004 CDSM NDSC: HCPCS | Performed by: RADIOLOGY

## 2022-02-02 RX ORDER — AMITRIPTYLINE HYDROCHLORIDE 75 MG/1
75 TABLET ORAL NIGHTLY
Qty: 30 TABLET | Refills: 0 | Status: SHIPPED | OUTPATIENT
Start: 2022-02-02 | End: 2022-02-03 | Stop reason: SDUPTHER

## 2022-02-03 ENCOUNTER — LAB SERVICES (OUTPATIENT)
Dept: LAB | Age: 47
End: 2022-02-03

## 2022-02-03 ENCOUNTER — OFFICE VISIT (OUTPATIENT)
Dept: INTERNAL MEDICINE | Age: 47
End: 2022-02-03

## 2022-02-03 ENCOUNTER — TELEPHONE (OUTPATIENT)
Dept: INTERNAL MEDICINE | Age: 47
End: 2022-02-03

## 2022-02-03 VITALS
WEIGHT: 187 LBS | HEIGHT: 68 IN | SYSTOLIC BLOOD PRESSURE: 120 MMHG | BODY MASS INDEX: 28.34 KG/M2 | TEMPERATURE: 99.1 F | HEART RATE: 89 BPM | OXYGEN SATURATION: 97 % | DIASTOLIC BLOOD PRESSURE: 72 MMHG

## 2022-02-03 DIAGNOSIS — Z85.71 H/O HODGKIN'S LYMPHOMA: ICD-10-CM

## 2022-02-03 DIAGNOSIS — M51.26 LUMBAR DISC HERNIATION: ICD-10-CM

## 2022-02-03 DIAGNOSIS — R74.8 ALKALINE PHOSPHATASE ELEVATION: ICD-10-CM

## 2022-02-03 DIAGNOSIS — R74.01 ELEVATION OF LEVELS OF LIVER TRANSAMINASE LEVELS: ICD-10-CM

## 2022-02-03 DIAGNOSIS — R74.8 ELEVATED LIVER ENZYMES: ICD-10-CM

## 2022-02-03 DIAGNOSIS — R29.898 LEFT LEG WEAKNESS: ICD-10-CM

## 2022-02-03 DIAGNOSIS — M48.061 SPINAL STENOSIS OF LUMBAR REGION WITHOUT NEUROGENIC CLAUDICATION: ICD-10-CM

## 2022-02-03 DIAGNOSIS — E78.00 ELEVATED CHOLESTEROL: ICD-10-CM

## 2022-02-03 DIAGNOSIS — Z12.11 SCREENING FOR COLON CANCER: ICD-10-CM

## 2022-02-03 DIAGNOSIS — M25.50 ARTHRALGIA, UNSPECIFIED JOINT: ICD-10-CM

## 2022-02-03 DIAGNOSIS — R79.89 ELEVATED TSH: Primary | ICD-10-CM

## 2022-02-03 DIAGNOSIS — Z12.5 SPECIAL SCREENING FOR MALIGNANT NEOPLASM OF PROSTATE: ICD-10-CM

## 2022-02-03 DIAGNOSIS — F43.10 PTSD (POST-TRAUMATIC STRESS DISORDER): ICD-10-CM

## 2022-02-03 DIAGNOSIS — Z00.00 PREVENTATIVE HEALTH CARE: ICD-10-CM

## 2022-02-03 DIAGNOSIS — E03.9 HYPOTHYROIDISM, UNSPECIFIED TYPE: ICD-10-CM

## 2022-02-03 DIAGNOSIS — R79.89 ELEVATED TSH: ICD-10-CM

## 2022-02-03 LAB
ALBUMIN SERPL-MCNC: 4.6 G/DL (ref 3.6–5.1)
ALBUMIN SERPL-MCNC: 4.6 G/DL (ref 3.6–5.1)
ALP SERPL-CCNC: 157 U/L (ref 45–115)
ALP SERPL-CCNC: 157 U/L (ref 45–115)
ALT SERPL W/O P-5'-P-CCNC: 62 U/L (ref 5–49)
ALT SERPL W/O P-5'-P-CCNC: 62 U/L (ref 5–49)
AST SERPL-CCNC: 37 U/L (ref 14–43)
AST SERPL-CCNC: 37 U/L (ref 14–43)
BILIRUB CONJ SERPL-MCNC: 0 MG/DL (ref 0–0.3)
BILIRUB SERPL-MCNC: 0.4 MG/DL (ref 0–1.3)
BILIRUB SERPL-MCNC: 0.4 MG/DL (ref 0–1.3)
BUN SERPL-MCNC: 21 MG/DL (ref 6–27)
CALCIUM SERPL-MCNC: 9.8 MG/DL (ref 8.6–10.6)
CHLORIDE SERPL-SCNC: 106 MMOL/L (ref 96–107)
CO2 SERPL-SCNC: 24 MMOL/L (ref 22–32)
CREAT SERPL-MCNC: 1.5 MG/DL (ref 0.6–1.6)
FERRITIN SERPL-MCNC: 293 NG/ML (ref 18–464)
GFR SERPL CREATININE-BSD FRML MDRD: 50 ML/MIN/{1.73M2}
GFR SERPL CREATININE-BSD FRML MDRD: >60 ML/MIN/{1.73M2}
GLUCOSE SERPL-MCNC: 94 MG/DL (ref 70–200)
IRON SATN MFR SERPL: 35 % (ref 13–59)
IRON SERPL-MCNC: 127 UG/DL (ref 49–181)
POTASSIUM SERPL-SCNC: 4.6 MMOL/L (ref 3.5–5.3)
PROT SERPL-MCNC: 7.6 G/DL (ref 6.4–8.5)
PROT SERPL-MCNC: 7.6 G/DL (ref 6.4–8.5)
SODIUM SERPL-SCNC: 138 MMOL/L (ref 136–146)
T4 FREE SERPL-MCNC: 0.93 NG/DL (ref 0.78–2.19)
TIBC SERPL-MCNC: 362 UG/DL (ref 250–450)
TSH SERPL DL<=0.05 MIU/L-ACNC: 4.34 M[IU]/L (ref 0.3–4.82)

## 2022-02-03 PROCEDURE — PSEU8502 ANTI SMOOTH MUSCLE ANTIBODY, F ACTIN: Performed by: CLINICAL MEDICAL LABORATORY

## 2022-02-03 PROCEDURE — G0439 PPPS, SUBSEQ VISIT: HCPCS | Performed by: INTERNAL MEDICINE

## 2022-02-03 PROCEDURE — 82248 BILIRUBIN DIRECT: CPT | Performed by: INTERNAL MEDICINE

## 2022-02-03 PROCEDURE — 86381 MITOCHONDRIAL ANTIBODY EACH: CPT | Performed by: CLINICAL MEDICAL LABORATORY

## 2022-02-03 PROCEDURE — 84443 ASSAY THYROID STIM HORMONE: CPT | Performed by: INTERNAL MEDICINE

## 2022-02-03 PROCEDURE — 84080 ASSAY ALKALINE PHOSPHATASES: CPT | Performed by: CLINICAL MEDICAL LABORATORY

## 2022-02-03 PROCEDURE — 84080 ASSAY ALKALINE PHOSPHATASES: CPT | Performed by: INTERNAL MEDICINE

## 2022-02-03 PROCEDURE — 80307 DRUG TEST PRSMV CHEM ANLYZR: CPT | Performed by: CLINICAL MEDICAL LABORATORY

## 2022-02-03 PROCEDURE — 86038 ANTINUCLEAR ANTIBODIES: CPT | Performed by: INTERNAL MEDICINE

## 2022-02-03 PROCEDURE — 84075 ASSAY ALKALINE PHOSPHATASE: CPT | Performed by: CLINICAL MEDICAL LABORATORY

## 2022-02-03 PROCEDURE — 84075 ASSAY ALKALINE PHOSPHATASE: CPT | Performed by: INTERNAL MEDICINE

## 2022-02-03 PROCEDURE — 99214 OFFICE O/P EST MOD 30 MIN: CPT | Performed by: INTERNAL MEDICINE

## 2022-02-03 PROCEDURE — PSEU9099 ALKALINE PHOSPHATASE, ISOENZYMES: Performed by: CLINICAL MEDICAL LABORATORY

## 2022-02-03 PROCEDURE — PSEU9740 PAIN MANAGEMENT, PROFILE 1: Performed by: CLINICAL MEDICAL LABORATORY

## 2022-02-03 PROCEDURE — 86015 ACTIN ANTIBODY EACH: CPT | Performed by: CLINICAL MEDICAL LABORATORY

## 2022-02-03 PROCEDURE — 36415 COLL VENOUS BLD VENIPUNCTURE: CPT | Performed by: INTERNAL MEDICINE

## 2022-02-03 PROCEDURE — 86225 DNA ANTIBODY NATIVE: CPT | Performed by: INTERNAL MEDICINE

## 2022-02-03 PROCEDURE — 82390 ASSAY OF CERULOPLASMIN: CPT | Performed by: CLINICAL MEDICAL LABORATORY

## 2022-02-03 PROCEDURE — PSEU9046 MITOCHONDRIAL ANTIBODY: Performed by: CLINICAL MEDICAL LABORATORY

## 2022-02-03 PROCEDURE — 3078F DIAST BP <80 MM HG: CPT | Performed by: INTERNAL MEDICINE

## 2022-02-03 PROCEDURE — 80074 ACUTE HEPATITIS PANEL: CPT | Performed by: CLINICAL MEDICAL LABORATORY

## 2022-02-03 PROCEDURE — 3074F SYST BP LT 130 MM HG: CPT | Performed by: INTERNAL MEDICINE

## 2022-02-03 PROCEDURE — 82728 ASSAY OF FERRITIN: CPT | Performed by: INTERNAL MEDICINE

## 2022-02-03 PROCEDURE — PSEU8200 HEPATITIS SEROLOGY PANEL ACUTE WITH REFLEX HCV PCR: Performed by: CLINICAL MEDICAL LABORATORY

## 2022-02-03 PROCEDURE — 80307 DRUG TEST PRSMV CHEM ANLYZR: CPT | Performed by: INTERNAL MEDICINE

## 2022-02-03 PROCEDURE — 83540 ASSAY OF IRON: CPT | Performed by: INTERNAL MEDICINE

## 2022-02-03 PROCEDURE — 80053 COMPREHEN METABOLIC PANEL: CPT | Performed by: INTERNAL MEDICINE

## 2022-02-03 PROCEDURE — PSEU8512 CERULOPLASMIN: Performed by: CLINICAL MEDICAL LABORATORY

## 2022-02-03 PROCEDURE — 83550 IRON BINDING TEST: CPT | Performed by: INTERNAL MEDICINE

## 2022-02-03 PROCEDURE — 84439 ASSAY OF FREE THYROXINE: CPT | Performed by: INTERNAL MEDICINE

## 2022-02-03 RX ORDER — GABAPENTIN 300 MG/1
300 CAPSULE ORAL 2 TIMES DAILY
Qty: 60 CAPSULE | Refills: 5 | Status: SHIPPED | OUTPATIENT
Start: 2022-02-03 | End: 2022-05-25 | Stop reason: ALTCHOICE

## 2022-02-03 RX ORDER — CLONIDINE HYDROCHLORIDE 0.1 MG/1
0.1 TABLET ORAL 2 TIMES DAILY
Qty: 60 TABLET | Refills: 5 | Status: SHIPPED | OUTPATIENT
Start: 2022-02-03 | End: 2022-06-20

## 2022-02-03 RX ORDER — AMITRIPTYLINE HYDROCHLORIDE 75 MG/1
75 TABLET ORAL NIGHTLY
Qty: 30 TABLET | Refills: 5 | Status: SHIPPED | OUTPATIENT
Start: 2022-02-03 | End: 2022-04-08 | Stop reason: SDUPTHER

## 2022-02-03 RX ORDER — ATORVASTATIN CALCIUM 10 MG/1
10 TABLET, FILM COATED ORAL DAILY
Qty: 90 TABLET | Refills: 1 | Status: SHIPPED | OUTPATIENT
Start: 2022-02-03 | End: 2022-09-12 | Stop reason: SDUPTHER

## 2022-02-03 RX ORDER — TIZANIDINE 4 MG/1
4 TABLET ORAL 2 TIMES DAILY
Qty: 60 TABLET | Refills: 5 | Status: SHIPPED | OUTPATIENT
Start: 2022-02-03 | End: 2022-05-06

## 2022-02-03 RX ORDER — NALOXONE HYDROCHLORIDE 4 MG/.1ML
SPRAY NASAL
Qty: 2 EACH | Refills: 1 | Status: SHIPPED | OUTPATIENT
Start: 2022-02-03 | End: 2022-03-21

## 2022-02-03 RX ORDER — TRAMADOL HYDROCHLORIDE 50 MG/1
50 TABLET ORAL EVERY 6 HOURS PRN
Qty: 60 TABLET | Refills: 5 | Status: SHIPPED | OUTPATIENT
Start: 2022-02-03 | End: 2022-05-25 | Stop reason: ALTCHOICE

## 2022-02-03 ASSESSMENT — PATIENT HEALTH QUESTIONNAIRE - PHQ9
CLINICAL INTERPRETATION OF PHQ2 SCORE: FURTHER SCREENING NEEDED
SUM OF ALL RESPONSES TO PHQ9 QUESTIONS 1 AND 2: 4
7. TROUBLE CONCENTRATING ON THINGS, SUCH AS READING THE NEWSPAPER OR WATCHING TELEVISION: SEVERAL DAYS
2. FEELING DOWN, DEPRESSED OR HOPELESS: NEARLY EVERY DAY
4. FEELING TIRED OR HAVING LITTLE ENERGY: NEARLY EVERY DAY
5. POOR APPETITE, WEIGHT LOSS, OR OVEREATING: NOT AT ALL
CLINICAL INTERPRETATION OF PHQ9 SCORE: MODERATE DEPRESSION
10. IF YOU CHECKED OFF ANY PROBLEMS, HOW DIFFICULT HAVE THESE PROBLEMS MADE IT FOR YOU TO DO YOUR WORK, TAKE CARE OF THINGS AT HOME, OR GET ALONG WITH OTHER PEOPLE: VERY DIFFICULT
1. LITTLE INTEREST OR PLEASURE IN DOING THINGS: SEVERAL DAYS
SUM OF ALL RESPONSES TO PHQ9 QUESTIONS 1 AND 2: 4
8. MOVING OR SPEAKING SO SLOWLY THAT OTHER PEOPLE COULD HAVE NOTICED. OR THE OPPOSITE, BEING SO FIGETY OR RESTLESS THAT YOU HAVE BEEN MOVING AROUND A LOT MORE THAN USUAL: NOT AT ALL
SUM OF ALL RESPONSES TO PHQ QUESTIONS 1-9: 12
3. TROUBLE FALLING OR STAYING ASLEEP OR SLEEPING TOO MUCH: SEVERAL DAYS
6. FEELING BAD ABOUT YOURSELF - OR THAT YOU ARE A FAILURE OR HAVE LET YOURSELF OR YOUR FAMILY DOWN: NEARLY EVERY DAY
9. THOUGHTS THAT YOU WOULD BE BETTER OFF DEAD, OR OF HURTING YOURSELF: NOT AT ALL

## 2022-02-04 ENCOUNTER — LAB REQUISITION (OUTPATIENT)
Dept: LAB | Age: 47
End: 2022-02-04

## 2022-02-04 ENCOUNTER — TELEPHONE (OUTPATIENT)
Dept: INFUSION THERAPY | Age: 47
End: 2022-02-04

## 2022-02-04 DIAGNOSIS — R74.01 ELEVATION OF LEVELS OF LIVER TRANSAMINASE LEVELS: ICD-10-CM

## 2022-02-04 DIAGNOSIS — Z85.71 H/O HODGKIN'S LYMPHOMA: Primary | ICD-10-CM

## 2022-02-04 DIAGNOSIS — M25.50 PAIN IN UNSPECIFIED JOINT: ICD-10-CM

## 2022-02-04 DIAGNOSIS — M48.061 SPINAL STENOSIS, LUMBAR REGION WITHOUT NEUROGENIC CLAUDICATION: ICD-10-CM

## 2022-02-04 DIAGNOSIS — M51.26 OTHER INTERVERTEBRAL DISC DISPLACEMENT, LUMBAR REGION: ICD-10-CM

## 2022-02-04 DIAGNOSIS — E78.00 PURE HYPERCHOLESTEROLEMIA, UNSPECIFIED: ICD-10-CM

## 2022-02-04 DIAGNOSIS — R74.8 ABNORMAL LEVELS OF OTHER SERUM ENZYMES: ICD-10-CM

## 2022-02-04 DIAGNOSIS — R29.898 OTHER SYMPTOMS AND SIGNS INVOLVING THE MUSCULOSKELETAL SYSTEM: ICD-10-CM

## 2022-02-04 DIAGNOSIS — R79.89 OTHER SPECIFIED ABNORMAL FINDINGS OF BLOOD CHEMISTRY: ICD-10-CM

## 2022-02-04 LAB
ANA SER QL IA: NORMAL RATIO (ref 0–0.6)
ANNOTATION COMMENT IMP: NORMAL
DSDNA IGG SERPL IA-ACNC: NORMAL [IU]/ML (ref 0–10)
HAV IGM SER QL: NEGATIVE
HAV IGM SER QL: NEGATIVE
HBV CORE IGM SER QL: NEGATIVE
HBV CORE IGM SER QL: NEGATIVE
HBV SURFACE AG SER QL: NEGATIVE
HBV SURFACE AG SER QL: NEGATIVE
HCV AB SER QL: NEGATIVE
HCV AB SER QL: NEGATIVE
IMP & REVIEW OF LAB RESULTS: NORMAL

## 2022-02-05 LAB
CERULOPLASMIN SERPL-MCNC: 43.4 MG/DL (ref 22–58)
CERULOPLASMIN SERPL-MCNC: 43.4 MG/DL (ref 22–58)

## 2022-02-06 LAB
MITOCHONDRIA AB SER QL IF: 5.4 UNITS
MITOCHONDRIA M2 IGG SER-ACNC: 5.4 UNITS
SMA AB SER QL IA: NEGATIVE
SMOOTH MUSCLE AB SER QL IF: NEGATIVE

## 2022-02-08 LAB
ALP BONE CFR SERPL: 15.2 % (ref 10.7–68.3)
ALP BONE CFR SERPL: 15.2 % (ref 10.7–68.3)
ALP INTEST CFR SERPL: 7.3 % (ref 0–24.2)
ALP INTEST CFR SERPL: 7.3 % (ref 0–24.2)
ALP LIVER CFR SERPL: 77.4 % (ref 26–86.2)
ALP LIVER CFR SERPL: 77.4 % (ref 26–86.2)
ALP SERPL-CCNC: 169 U/L (ref 38–113)
ALP SERPL-CCNC: 169 U/L (ref 38–113)
BONE FRACTION: 25.7 U/L (ref 12.9–52.6)
BONE FRACTION: 25.7 U/L (ref 12.9–52.6)
INTESTINE FRACTION: 12.3 U/L (ref 0–16.3)
INTESTINE FRACTION: 12.3 U/L (ref 0–16.3)
LIVER FRACTION: 130.8 U/L (ref 16–69.3)
LIVER FRACTION: 130.8 U/L (ref 16–69.3)
SERVICE CMNT-IMP: NORMAL
SERVICE CMNT-IMP: NORMAL

## 2022-03-07 RX ORDER — TIZANIDINE 4 MG/1
4 TABLET ORAL 2 TIMES DAILY
Qty: 60 TABLET | Refills: 5 | Status: CANCELLED | OUTPATIENT
Start: 2022-03-07

## 2022-03-07 RX ORDER — AMITRIPTYLINE HYDROCHLORIDE 75 MG/1
75 TABLET ORAL NIGHTLY
Qty: 30 TABLET | Refills: 5 | Status: CANCELLED | OUTPATIENT
Start: 2022-03-07

## 2022-03-09 ENCOUNTER — APPOINTMENT (OUTPATIENT)
Dept: PAIN MANAGEMENT | Age: 47
End: 2022-03-09

## 2022-03-16 ENCOUNTER — EXTERNAL RECORD (OUTPATIENT)
Dept: HEALTH INFORMATION MANAGEMENT | Facility: OTHER | Age: 47
End: 2022-03-16

## 2022-03-21 ENCOUNTER — TELEPHONE (OUTPATIENT)
Dept: RHEUMATOLOGY | Age: 47
End: 2022-03-21

## 2022-03-21 ENCOUNTER — OFFICE VISIT (OUTPATIENT)
Dept: PAIN MANAGEMENT | Age: 47
End: 2022-03-21

## 2022-03-21 VITALS — BODY MASS INDEX: 28.64 KG/M2 | WEIGHT: 189 LBS | HEIGHT: 68 IN

## 2022-03-21 DIAGNOSIS — M54.50 CHRONIC BILATERAL LOW BACK PAIN WITHOUT SCIATICA: ICD-10-CM

## 2022-03-21 DIAGNOSIS — M51.36 DDD (DEGENERATIVE DISC DISEASE), LUMBAR: ICD-10-CM

## 2022-03-21 DIAGNOSIS — G89.29 CHRONIC BILATERAL LOW BACK PAIN WITHOUT SCIATICA: ICD-10-CM

## 2022-03-21 DIAGNOSIS — G89.4 CHRONIC PAIN SYNDROME: Primary | ICD-10-CM

## 2022-03-21 DIAGNOSIS — M79.7 FIBROMYALGIA: ICD-10-CM

## 2022-03-21 PROCEDURE — 99214 OFFICE O/P EST MOD 30 MIN: CPT | Performed by: PHYSICAL MEDICINE & REHABILITATION

## 2022-03-21 RX ORDER — IBUPROFEN 800 MG/1
800 TABLET ORAL EVERY 8 HOURS PRN
Qty: 30 TABLET | Refills: 3 | Status: SHIPPED | OUTPATIENT
Start: 2022-03-21 | End: 2022-05-03

## 2022-03-21 RX ORDER — NALOXONE HYDROCHLORIDE 4 MG/.1ML
SPRAY NASAL
Qty: 2 EACH | Refills: 1 | Status: SHIPPED | OUTPATIENT
Start: 2022-03-21 | End: 2022-11-21 | Stop reason: SDUPTHER

## 2022-04-08 ENCOUNTER — TELEPHONE (OUTPATIENT)
Dept: INTERNAL MEDICINE | Age: 47
End: 2022-04-08

## 2022-04-08 RX ORDER — AMITRIPTYLINE HYDROCHLORIDE 75 MG/1
75 TABLET ORAL NIGHTLY
Qty: 30 TABLET | Refills: 3 | Status: SHIPPED | OUTPATIENT
Start: 2022-04-08 | End: 2022-07-27 | Stop reason: SDUPTHER

## 2022-04-13 ENCOUNTER — APPOINTMENT (OUTPATIENT)
Dept: INFUSION THERAPY | Age: 47
End: 2022-04-13
Attending: INTERNAL MEDICINE

## 2022-04-13 ENCOUNTER — TELEPHONE (OUTPATIENT)
Dept: INFUSION THERAPY | Age: 47
End: 2022-04-13

## 2022-05-03 RX ORDER — IBUPROFEN 800 MG/1
800 TABLET ORAL EVERY 8 HOURS PRN
Qty: 30 TABLET | Refills: 3 | Status: SHIPPED | OUTPATIENT
Start: 2022-05-03 | End: 2022-09-12 | Stop reason: SDUPTHER

## 2022-05-06 RX ORDER — TIZANIDINE 4 MG/1
TABLET ORAL
Qty: 60 TABLET | Refills: 5 | Status: SHIPPED | OUTPATIENT
Start: 2022-05-06 | End: 2022-12-05 | Stop reason: SDUPTHER

## 2022-05-11 ENCOUNTER — LAB SERVICES (OUTPATIENT)
Dept: LAB | Age: 47
End: 2022-05-11

## 2022-05-11 ENCOUNTER — OFFICE VISIT (OUTPATIENT)
Dept: INFUSION THERAPY | Age: 47
End: 2022-05-11
Attending: INTERNAL MEDICINE

## 2022-05-11 VITALS
WEIGHT: 182.21 LBS | DIASTOLIC BLOOD PRESSURE: 86 MMHG | HEART RATE: 87 BPM | RESPIRATION RATE: 18 BRPM | TEMPERATURE: 97.3 F | SYSTOLIC BLOOD PRESSURE: 142 MMHG | HEIGHT: 68 IN | BODY MASS INDEX: 27.62 KG/M2 | OXYGEN SATURATION: 97 %

## 2022-05-11 DIAGNOSIS — Z85.71 H/O HODGKIN'S LYMPHOMA: Primary | ICD-10-CM

## 2022-05-11 DIAGNOSIS — Z85.71 H/O HODGKIN'S LYMPHOMA: ICD-10-CM

## 2022-05-11 DIAGNOSIS — E03.9 HYPOTHYROIDISM, UNSPECIFIED TYPE: ICD-10-CM

## 2022-05-11 DIAGNOSIS — R79.89 ELEVATED TSH: ICD-10-CM

## 2022-05-11 DIAGNOSIS — E78.00 ELEVATED CHOLESTEROL: ICD-10-CM

## 2022-05-11 LAB
ALBUMIN SERPL BCG-MCNC: 5.1 G/DL (ref 3.6–5.1)
ALP SERPL-CCNC: 201 U/L (ref 45–115)
ALT SERPL W/O P-5'-P-CCNC: 62 U/L (ref 10–35)
AST SERPL-CCNC: 32 U/L (ref 9–37)
BASOPHIL %: 0.7 % (ref 0–1.2)
BASOPHIL ABSOLUTE #: 0.1 10*3/UL (ref 0–0.1)
BILIRUB SERPL-MCNC: <0.2 MG/DL (ref 0–1)
BUN SERPL-MCNC: 14 MG/DL (ref 6–27)
CALCIUM SERPL-MCNC: 10.4 MG/DL (ref 8.6–10.6)
CHLORIDE SERPL-SCNC: 104 MMOL/L (ref 96–107)
CHOLEST SERPL-MCNC: 258 MG/DL (ref 140–200)
CREAT SERPL-MCNC: 1.2 MG/DL (ref 0.6–1.6)
DIFFERENTIAL TYPE: NORMAL
EOSINOPHIL %: 2.6 % (ref 0–10)
EOSINOPHIL ABSOLUTE #: 0.2 10*3/UL (ref 0–0.5)
GFR SERPL CREATININE-BSD FRML MDRD: >60 ML/MIN/{1.73M2}
GFR SERPL CREATININE-BSD FRML MDRD: >60 ML/MIN/{1.73M2}
GLUCOSE SERPL-MCNC: 100 MG/DL (ref 70–200)
HCO3 SERPL-SCNC: 25 MMOL/L (ref 22–32)
HDLC SERPL-MCNC: 31 MG/DL
HEMATOCRIT: 42.4 % (ref 40–51)
HEMOGLOBIN: 14.8 G/DL (ref 13.7–17.5)
IMMATURE GRANULOCYTE ABSOLUTE: 0.02 10*3/UL (ref 0–0.05)
IMMATURE GRANULOCYTE PERCENT: 0.3 % (ref 0–0.5)
LDH SERPL P TO L-CCNC: 466 U/L (ref 313–618)
LDLC SERPL DIRECT ASSAY-MCNC: 98 MG/DL (ref 0–100)
LYMPH PERCENT: 39.4 % (ref 20.5–51.1)
LYMPHOCYTE ABSOLUTE #: 2.9 10*3/UL (ref 1.2–3.4)
MEAN CORPUSCULAR HGB CONCENTRATION: 34.9 % (ref 32–36)
MEAN CORPUSCULAR HGB: 32.2 PG (ref 27–34)
MEAN CORPUSCULAR VOLUME: 92.4 FL (ref 79–95)
MEAN PLATELET VOLUME: 8.8 FL (ref 8.6–12.4)
MONOCYTE ABSOLUTE #: 0.5 10*3/UL (ref 0.2–0.9)
MONOCYTE PERCENT: 6.1 % (ref 4.3–12.9)
NEUTROPHIL ABSOLUTE #: 3.8 10*3/UL (ref 1.4–6.5)
NEUTROPHIL PERCENT: 50.9 % (ref 34–73.5)
PLATELET COUNT: 180 10*3/UL (ref 150–400)
POTASSIUM SERPL-SCNC: 4.5 MMOL/L (ref 3.5–5.3)
PROT SERPL-MCNC: 6.8 G/DL (ref 6.2–8.1)
RED BLOOD CELL COUNT: 4.59 10*6/UL (ref 3.9–5.7)
RED CELL DISTRIBUTION WIDTH: 12.5 % (ref 11.3–14.8)
SODIUM SERPL-SCNC: 137 MMOL/L (ref 136–146)
TRIGL SERPL-MCNC: 1120 MG/DL (ref 0–200)
TSH SERPL DL<=0.05 MIU/L-ACNC: 4.15 M[IU]/L (ref 0.3–4.82)
WHITE BLOOD CELL COUNT: 7.4 10*3/UL (ref 4–10)

## 2022-05-11 PROCEDURE — 83721 ASSAY OF BLOOD LIPOPROTEIN: CPT | Performed by: PHYSICIAN ASSISTANT

## 2022-05-11 PROCEDURE — 36415 COLL VENOUS BLD VENIPUNCTURE: CPT | Performed by: PHYSICIAN ASSISTANT

## 2022-05-11 PROCEDURE — 3077F SYST BP >= 140 MM HG: CPT | Performed by: INTERNAL MEDICINE

## 2022-05-11 PROCEDURE — 80061 LIPID PANEL: CPT | Performed by: PHYSICIAN ASSISTANT

## 2022-05-11 PROCEDURE — 83615 LACTATE (LD) (LDH) ENZYME: CPT | Performed by: INTERNAL MEDICINE

## 2022-05-11 PROCEDURE — 3079F DIAST BP 80-89 MM HG: CPT | Performed by: INTERNAL MEDICINE

## 2022-05-11 PROCEDURE — 80050 GENERAL HEALTH PANEL: CPT | Performed by: INTERNAL MEDICINE

## 2022-05-11 PROCEDURE — 99202 OFFICE O/P NEW SF 15 MIN: CPT

## 2022-05-11 PROCEDURE — 99204 OFFICE O/P NEW MOD 45 MIN: CPT | Performed by: INTERNAL MEDICINE

## 2022-05-11 ASSESSMENT — ENCOUNTER SYMPTOMS
GASTROINTESTINAL NEGATIVE: 1
BACK PAIN: 1
FATIGUE: 0
RESPIRATORY NEGATIVE: 1
HEMATOLOGIC/LYMPHATIC NEGATIVE: 1

## 2022-05-11 ASSESSMENT — PAIN SCALES - GENERAL: PAINLEVEL: 0

## 2022-05-16 DIAGNOSIS — E78.1 HIGH TRIGLYCERIDES: Primary | ICD-10-CM

## 2022-05-16 RX ORDER — ICOSAPENT ETHYL 1000 MG/1
CAPSULE ORAL
Qty: 120 CAPSULE | Refills: 4 | Status: SHIPPED | OUTPATIENT
Start: 2022-05-16 | End: 2022-10-10

## 2022-05-25 ENCOUNTER — OFFICE VISIT (OUTPATIENT)
Dept: PAIN MANAGEMENT | Age: 47
End: 2022-05-25

## 2022-05-25 DIAGNOSIS — M79.10 MYALGIA: ICD-10-CM

## 2022-05-25 DIAGNOSIS — G62.0 PERIPHERAL NEUROPATHY DUE TO CHEMOTHERAPY (CMD): ICD-10-CM

## 2022-05-25 DIAGNOSIS — G89.29 CHRONIC BILATERAL LOW BACK PAIN WITHOUT SCIATICA: Primary | ICD-10-CM

## 2022-05-25 DIAGNOSIS — Z85.71 H/O HODGKIN'S LYMPHOMA: ICD-10-CM

## 2022-05-25 DIAGNOSIS — T45.1X5A PERIPHERAL NEUROPATHY DUE TO CHEMOTHERAPY (CMD): ICD-10-CM

## 2022-05-25 DIAGNOSIS — M54.50 CHRONIC BILATERAL LOW BACK PAIN WITHOUT SCIATICA: Primary | ICD-10-CM

## 2022-05-25 DIAGNOSIS — M79.7 FIBROMYALGIA: ICD-10-CM

## 2022-05-25 DIAGNOSIS — F51.01 PRIMARY INSOMNIA: ICD-10-CM

## 2022-05-25 PROCEDURE — 99205 OFFICE O/P NEW HI 60 MIN: CPT | Performed by: PEDIATRICS

## 2022-05-25 RX ORDER — PREGABALIN 50 MG/1
50 CAPSULE ORAL 2 TIMES DAILY
Qty: 60 CAPSULE | Refills: 0 | Status: SHIPPED | OUTPATIENT
Start: 2022-05-25 | End: 2022-09-22 | Stop reason: ALTCHOICE

## 2022-05-25 RX ORDER — PREGABALIN 75 MG/1
75 CAPSULE ORAL 2 TIMES DAILY
Qty: 60 CAPSULE | Refills: 1 | Status: SHIPPED | OUTPATIENT
Start: 2022-06-22 | End: 2022-09-12 | Stop reason: SDUPTHER

## 2022-05-25 ASSESSMENT — PATIENT HEALTH QUESTIONNAIRE - PHQ9
7. TROUBLE CONCENTRATING ON THINGS, SUCH AS READING THE NEWSPAPER OR WATCHING TELEVISION: NOT AT ALL
9. THOUGHTS THAT YOU WOULD BE BETTER OFF DEAD, OR OF HURTING YOURSELF: NOT AT ALL
6. FEELING BAD ABOUT YOURSELF - OR THAT YOU ARE A FAILURE OR HAVE LET YOURSELF OR YOUR FAMILY DOWN: MORE THAN HALF THE DAYS
5. POOR APPETITE, WEIGHT LOSS, OR OVEREATING: NOT AT ALL
2. FEELING DOWN, DEPRESSED OR HOPELESS: NEARLY EVERY DAY
3. TROUBLE FALLING OR STAYING ASLEEP OR SLEEPING TOO MUCH: MORE THAN HALF THE DAYS
4. FEELING TIRED OR HAVING LITTLE ENERGY: MORE THAN HALF THE DAYS

## 2022-05-25 ASSESSMENT — ENCOUNTER SYMPTOMS
HEADACHES: 0
APPETITE CHANGE: 0
FEVER: 0
BACK PAIN: 1
FATIGUE: 0
CONSTIPATION: 0
APNEA: 0
SHORTNESS OF BREATH: 0
RHINORRHEA: 0
PSYCHIATRIC NEGATIVE: 1
NERVOUS/ANXIOUS: 0
CHILLS: 0
CHEST TIGHTNESS: 0
DIARRHEA: 0
SEIZURES: 0
WEAKNESS: 0
SORE THROAT: 0
SLEEP DISTURBANCE: 0
ACTIVITY CHANGE: 0

## 2022-06-08 ENCOUNTER — TELEPHONE (OUTPATIENT)
Dept: INTERNAL MEDICINE | Age: 47
End: 2022-06-08

## 2022-06-20 RX ORDER — CLONIDINE HYDROCHLORIDE 0.1 MG/1
TABLET ORAL
Qty: 60 TABLET | Refills: 5 | Status: SHIPPED | OUTPATIENT
Start: 2022-06-20 | End: 2022-10-14 | Stop reason: ALTCHOICE

## 2022-06-23 ENCOUNTER — TELEPHONE (OUTPATIENT)
Dept: BEHAVIORAL HEALTH | Age: 47
End: 2022-06-23

## 2022-06-29 ENCOUNTER — LAB REQUISITION (OUTPATIENT)
Dept: LAB | Age: 47
End: 2022-06-29

## 2022-06-29 ENCOUNTER — LAB SERVICES (OUTPATIENT)
Dept: LAB | Age: 47
End: 2022-06-29

## 2022-06-29 ENCOUNTER — OFFICE VISIT (OUTPATIENT)
Dept: GASTROENTEROLOGY | Age: 47
End: 2022-06-29
Attending: INTERNAL MEDICINE

## 2022-06-29 VITALS — HEIGHT: 68 IN | WEIGHT: 184 LBS | BODY MASS INDEX: 27.89 KG/M2

## 2022-06-29 DIAGNOSIS — R74.8 ABNORMAL LEVELS OF OTHER SERUM ENZYMES: ICD-10-CM

## 2022-06-29 DIAGNOSIS — R12 HEARTBURN: ICD-10-CM

## 2022-06-29 DIAGNOSIS — Z01.818 PRE-OP TESTING: ICD-10-CM

## 2022-06-29 DIAGNOSIS — R74.8 LIVER ENZYME ELEVATION: ICD-10-CM

## 2022-06-29 DIAGNOSIS — R74.8 LIVER ENZYME ELEVATION: Primary | ICD-10-CM

## 2022-06-29 PROCEDURE — 82728 ASSAY OF FERRITIN: CPT | Performed by: INTERNAL MEDICINE

## 2022-06-29 PROCEDURE — 83550 IRON BINDING TEST: CPT | Performed by: INTERNAL MEDICINE

## 2022-06-29 PROCEDURE — PSEU8512 CERULOPLASMIN: Performed by: CLINICAL MEDICAL LABORATORY

## 2022-06-29 PROCEDURE — PSEU9046 MITOCHONDRIAL ANTIBODY: Performed by: CLINICAL MEDICAL LABORATORY

## 2022-06-29 PROCEDURE — 82103 ALPHA-1-ANTITRYPSIN TOTAL: CPT | Performed by: CLINICAL MEDICAL LABORATORY

## 2022-06-29 PROCEDURE — PSEU8498 ALPHA 1 ANTITRYPSIN: Performed by: CLINICAL MEDICAL LABORATORY

## 2022-06-29 PROCEDURE — 86381 MITOCHONDRIAL ANTIBODY EACH: CPT | Performed by: CLINICAL MEDICAL LABORATORY

## 2022-06-29 PROCEDURE — 86225 DNA ANTIBODY NATIVE: CPT | Performed by: INTERNAL MEDICINE

## 2022-06-29 PROCEDURE — 82390 ASSAY OF CERULOPLASMIN: CPT | Performed by: CLINICAL MEDICAL LABORATORY

## 2022-06-29 PROCEDURE — 83540 ASSAY OF IRON: CPT | Performed by: INTERNAL MEDICINE

## 2022-06-29 PROCEDURE — 36415 COLL VENOUS BLD VENIPUNCTURE: CPT | Performed by: INTERNAL MEDICINE

## 2022-06-29 PROCEDURE — 86038 ANTINUCLEAR ANTIBODIES: CPT | Performed by: INTERNAL MEDICINE

## 2022-06-29 PROCEDURE — 99204 OFFICE O/P NEW MOD 45 MIN: CPT | Performed by: INTERNAL MEDICINE

## 2022-06-29 PROCEDURE — 83516 IMMUNOASSAY NONANTIBODY: CPT | Performed by: INTERNAL MEDICINE

## 2022-06-30 LAB
A1AT SERPL-MCNC: 148 MG/DL (ref 90–200)
A1AT SERPL-MCNC: 148 MG/DL (ref 90–200)
ANA SER QL IA: NORMAL RATIO (ref 0–0.6)
CERULOPLASMIN SERPL-MCNC: 33.6 MG/DL (ref 20–60)
CERULOPLASMIN SERPL-MCNC: 33.6 MG/DL (ref 20–60)
DSDNA IGG SERPL IA-ACNC: NORMAL [IU]/ML (ref 0–10)
FERRITIN SERPL-MCNC: 217 NG/ML (ref 18–464)
GLIADIN PEPTIDE IGA SER IA-ACNC: 0.6 U/ML (ref 0–7)
GLIADIN PEPTIDE IGG SER IA-ACNC: <0.6 U/ML (ref 0–7)
IRON SATN MFR SERPL: 33 % (ref 13–59)
IRON SERPL-MCNC: 118 UG/DL (ref 49–181)
TIBC SERPL-MCNC: 359 UG/DL (ref 250–450)
TTG IGA SER IA-ACNC: 0.4 U/ML (ref 0–7)
TTG IGG SER IA-ACNC: <0.6 U/ML (ref 0–7)

## 2022-07-03 LAB
MITOCHONDRIA AB SER QL IF: 6.7 UNITS
MITOCHONDRIA M2 IGG SER-ACNC: 6.7 UNITS

## 2022-07-11 ENCOUNTER — TELEPHONE (OUTPATIENT)
Dept: GASTROENTEROLOGY | Age: 47
End: 2022-07-11

## 2022-07-11 DIAGNOSIS — R74.8 LIVER ENZYME ELEVATION: ICD-10-CM

## 2022-07-11 DIAGNOSIS — Z01.818 PRE-OP TESTING: ICD-10-CM

## 2022-07-11 DIAGNOSIS — R12 HEARTBURN: Primary | ICD-10-CM

## 2022-07-12 ENCOUNTER — APPOINTMENT (OUTPATIENT)
Dept: LAB | Age: 47
End: 2022-07-12

## 2022-07-15 ENCOUNTER — APPOINTMENT (OUTPATIENT)
Dept: GASTROENTEROLOGY | Age: 47
End: 2022-07-15
Attending: INTERNAL MEDICINE

## 2022-07-15 RX ORDER — TIZANIDINE 4 MG/1
TABLET ORAL
Qty: 60 TABLET | Refills: 5 | OUTPATIENT
Start: 2022-07-15

## 2022-07-21 ENCOUNTER — EXTERNAL RECORD (OUTPATIENT)
Dept: HEALTH INFORMATION MANAGEMENT | Facility: OTHER | Age: 47
End: 2022-07-21

## 2022-07-22 ENCOUNTER — TELEPHONE (OUTPATIENT)
Dept: GASTROENTEROLOGY | Age: 47
End: 2022-07-22

## 2022-07-22 DIAGNOSIS — R74.8 LIVER ENZYME ELEVATION: Primary | ICD-10-CM

## 2022-07-25 DIAGNOSIS — R63.4 WEIGHT LOSS: Primary | ICD-10-CM

## 2022-07-26 RX ORDER — PREGABALIN 50 MG/1
50 CAPSULE ORAL 2 TIMES DAILY
Qty: 60 CAPSULE | Refills: 2 | Status: CANCELLED | OUTPATIENT
Start: 2022-07-28 | End: 2022-08-27

## 2022-07-27 ENCOUNTER — TELEPHONE (OUTPATIENT)
Dept: INTERNAL MEDICINE | Age: 47
End: 2022-07-27

## 2022-07-27 ENCOUNTER — TELEPHONE (OUTPATIENT)
Dept: PAIN MANAGEMENT | Age: 47
End: 2022-07-27

## 2022-07-27 RX ORDER — AMITRIPTYLINE HYDROCHLORIDE 75 MG/1
75 TABLET ORAL NIGHTLY
Qty: 30 TABLET | Refills: 3 | Status: CANCELLED | OUTPATIENT
Start: 2022-07-27

## 2022-07-27 RX ORDER — AMITRIPTYLINE HYDROCHLORIDE 75 MG/1
75 TABLET ORAL NIGHTLY
Qty: 30 TABLET | Refills: 6 | Status: SHIPPED | OUTPATIENT
Start: 2022-07-27 | End: 2023-02-14 | Stop reason: DRUGHIGH

## 2022-07-28 ENCOUNTER — APPOINTMENT (OUTPATIENT)
Dept: PAIN MANAGEMENT | Age: 47
End: 2022-07-28

## 2022-08-15 ENCOUNTER — E-ADVICE (OUTPATIENT)
Dept: NUTRITION | Age: 47
End: 2022-08-15

## 2022-08-17 ENCOUNTER — APPOINTMENT (OUTPATIENT)
Dept: BEHAVIORAL HEALTH | Age: 47
End: 2022-08-17

## 2022-08-17 ENCOUNTER — APPOINTMENT (OUTPATIENT)
Dept: BEHAVIORAL HEALTH | Age: 47
End: 2022-08-17
Attending: INTERNAL MEDICINE

## 2022-08-22 ENCOUNTER — V-VISIT (OUTPATIENT)
Dept: NUTRITION | Age: 47
End: 2022-08-22
Attending: INTERNAL MEDICINE

## 2022-08-22 DIAGNOSIS — R63.4 WEIGHT LOSS: ICD-10-CM

## 2022-08-22 PROCEDURE — 97802 MEDICAL NUTRITION INDIV IN: CPT | Performed by: INTERNAL MEDICINE

## 2022-08-23 RX ORDER — PREGABALIN 75 MG/1
75 CAPSULE ORAL 2 TIMES DAILY
Qty: 60 CAPSULE | Refills: 2 | Status: CANCELLED | OUTPATIENT
Start: 2022-08-27

## 2022-08-25 ENCOUNTER — APPOINTMENT (OUTPATIENT)
Dept: PAIN MANAGEMENT | Age: 47
End: 2022-08-25

## 2022-09-02 ENCOUNTER — TELEPHONE (OUTPATIENT)
Dept: GENERAL RADIOLOGY | Age: 47
End: 2022-09-02

## 2022-09-06 ENCOUNTER — MED INFO FORMS (OUTPATIENT)
Dept: HEALTH INFORMATION MANAGEMENT | Facility: OTHER | Age: 47
End: 2022-09-06

## 2022-09-12 ENCOUNTER — APPOINTMENT (OUTPATIENT)
Dept: BEHAVIORAL HEALTH | Age: 47
End: 2022-09-12

## 2022-09-12 DIAGNOSIS — T45.1X5A PERIPHERAL NEUROPATHY DUE TO CHEMOTHERAPY (CMD): ICD-10-CM

## 2022-09-12 DIAGNOSIS — G62.0 PERIPHERAL NEUROPATHY DUE TO CHEMOTHERAPY (CMD): ICD-10-CM

## 2022-09-12 DIAGNOSIS — Z85.71 H/O HODGKIN'S LYMPHOMA: ICD-10-CM

## 2022-09-12 DIAGNOSIS — M79.10 MYALGIA: ICD-10-CM

## 2022-09-12 DIAGNOSIS — F51.01 PRIMARY INSOMNIA: ICD-10-CM

## 2022-09-12 DIAGNOSIS — M79.7 FIBROMYALGIA: ICD-10-CM

## 2022-09-12 DIAGNOSIS — M54.50 CHRONIC BILATERAL LOW BACK PAIN WITHOUT SCIATICA: ICD-10-CM

## 2022-09-12 DIAGNOSIS — G89.29 CHRONIC BILATERAL LOW BACK PAIN WITHOUT SCIATICA: ICD-10-CM

## 2022-09-12 RX ORDER — PREGABALIN 75 MG/1
75 CAPSULE ORAL 2 TIMES DAILY
Qty: 60 CAPSULE | Refills: 0 | Status: SHIPPED | OUTPATIENT
Start: 2022-09-21 | End: 2022-09-21 | Stop reason: SDUPTHER

## 2022-09-12 RX ORDER — PREGABALIN 75 MG/1
75 CAPSULE ORAL 2 TIMES DAILY
Qty: 60 CAPSULE | Refills: 0 | Status: CANCELLED | OUTPATIENT
Start: 2022-09-21 | End: 2022-10-21

## 2022-09-12 RX ORDER — IBUPROFEN 800 MG/1
800 TABLET ORAL EVERY 8 HOURS PRN
Qty: 30 TABLET | Refills: 3 | Status: SHIPPED | OUTPATIENT
Start: 2022-09-12 | End: 2022-10-14 | Stop reason: ALTCHOICE

## 2022-09-12 RX ORDER — ATORVASTATIN CALCIUM 10 MG/1
10 TABLET, FILM COATED ORAL DAILY
Qty: 90 TABLET | Refills: 1 | Status: SHIPPED | OUTPATIENT
Start: 2022-09-12 | End: 2022-10-14 | Stop reason: ALTCHOICE

## 2022-09-12 RX ORDER — PREGABALIN 75 MG/1
75 CAPSULE ORAL 2 TIMES DAILY
Qty: 60 CAPSULE | Refills: 0 | Status: SHIPPED | OUTPATIENT
Start: 2022-09-21 | End: 2022-09-23 | Stop reason: SDUPTHER

## 2022-09-16 ENCOUNTER — TELEPHONE (OUTPATIENT)
Dept: INTERNAL MEDICINE | Age: 47
End: 2022-09-16

## 2022-09-22 ENCOUNTER — OFFICE VISIT (OUTPATIENT)
Dept: PAIN MANAGEMENT | Age: 47
End: 2022-09-22

## 2022-09-22 DIAGNOSIS — M54.50 CHRONIC BILATERAL LOW BACK PAIN WITHOUT SCIATICA: Primary | ICD-10-CM

## 2022-09-22 DIAGNOSIS — M79.10 MYALGIA: ICD-10-CM

## 2022-09-22 DIAGNOSIS — Z85.71 H/O HODGKIN'S LYMPHOMA: ICD-10-CM

## 2022-09-22 DIAGNOSIS — G62.0 PERIPHERAL NEUROPATHY DUE TO CHEMOTHERAPY (CMD): ICD-10-CM

## 2022-09-22 DIAGNOSIS — F51.01 PRIMARY INSOMNIA: ICD-10-CM

## 2022-09-22 DIAGNOSIS — M79.7 FIBROMYALGIA: ICD-10-CM

## 2022-09-22 DIAGNOSIS — G89.29 CHRONIC BILATERAL LOW BACK PAIN WITHOUT SCIATICA: Primary | ICD-10-CM

## 2022-09-22 DIAGNOSIS — T45.1X5A PERIPHERAL NEUROPATHY DUE TO CHEMOTHERAPY (CMD): ICD-10-CM

## 2022-09-22 PROCEDURE — 99214 OFFICE O/P EST MOD 30 MIN: CPT | Performed by: PEDIATRICS

## 2022-09-22 RX ORDER — PREGABALIN 75 MG/1
75 CAPSULE ORAL 2 TIMES DAILY
Qty: 60 CAPSULE | Refills: 2 | Status: SHIPPED | OUTPATIENT
Start: 2022-09-22 | End: 2022-11-25 | Stop reason: SDUPTHER

## 2022-09-23 ENCOUNTER — TELEPHONE (OUTPATIENT)
Dept: INTERNAL MEDICINE | Age: 47
End: 2022-09-23

## 2022-09-23 RX ORDER — GABAPENTIN 300 MG/1
300 CAPSULE ORAL 2 TIMES DAILY
COMMUNITY
Start: 2022-07-15 | End: 2022-09-23 | Stop reason: ALTCHOICE

## 2022-09-26 ENCOUNTER — V-VISIT (OUTPATIENT)
Dept: NUTRITION | Age: 47
End: 2022-09-26

## 2022-09-26 ENCOUNTER — E-ADVICE (OUTPATIENT)
Dept: INTERNAL MEDICINE | Age: 47
End: 2022-09-26

## 2022-09-26 ENCOUNTER — TELEPHONE (OUTPATIENT)
Dept: INTERNAL MEDICINE | Age: 47
End: 2022-09-26

## 2022-09-26 DIAGNOSIS — R63.4 WEIGHT LOSS: ICD-10-CM

## 2022-09-26 PROCEDURE — 97803 MED NUTRITION INDIV SUBSEQ: CPT | Performed by: INTERNAL MEDICINE

## 2022-09-29 ENCOUNTER — TELEPHONE (OUTPATIENT)
Dept: GASTROENTEROLOGY | Age: 47
End: 2022-09-29

## 2022-10-04 ENCOUNTER — WALK IN (OUTPATIENT)
Dept: URGENT CARE | Age: 47
End: 2022-10-04

## 2022-10-04 VITALS
OXYGEN SATURATION: 96 % | SYSTOLIC BLOOD PRESSURE: 116 MMHG | RESPIRATION RATE: 16 BRPM | HEART RATE: 98 BPM | DIASTOLIC BLOOD PRESSURE: 86 MMHG | TEMPERATURE: 98.6 F

## 2022-10-04 DIAGNOSIS — M54.50 ACUTE LOW BACK PAIN, UNSPECIFIED BACK PAIN LATERALITY, UNSPECIFIED WHETHER SCIATICA PRESENT: Primary | ICD-10-CM

## 2022-10-04 PROCEDURE — 99214 OFFICE O/P EST MOD 30 MIN: CPT | Performed by: FAMILY MEDICINE

## 2022-10-04 PROCEDURE — 3079F DIAST BP 80-89 MM HG: CPT | Performed by: FAMILY MEDICINE

## 2022-10-04 PROCEDURE — 3074F SYST BP LT 130 MM HG: CPT | Performed by: FAMILY MEDICINE

## 2022-10-04 RX ORDER — BACLOFEN 10 MG/1
10 TABLET ORAL 3 TIMES DAILY PRN
Qty: 15 TABLET | Refills: 0 | Status: SHIPPED | OUTPATIENT
Start: 2022-10-04 | End: 2022-12-16 | Stop reason: ALTCHOICE

## 2022-10-04 ASSESSMENT — PAIN SCALES - GENERAL: PAINLEVEL: 10

## 2022-10-04 ASSESSMENT — ENCOUNTER SYMPTOMS: BACK PAIN: 1

## 2022-10-05 ENCOUNTER — TELEPHONE (OUTPATIENT)
Dept: INTERNAL MEDICINE | Age: 47
End: 2022-10-05

## 2022-10-06 ENCOUNTER — TELEPHONE (OUTPATIENT)
Dept: INTERNAL MEDICINE | Age: 47
End: 2022-10-06

## 2022-10-07 ENCOUNTER — TELEPHONE (OUTPATIENT)
Dept: INTERNAL MEDICINE | Age: 47
End: 2022-10-07

## 2022-10-10 RX ORDER — ICOSAPENT ETHYL 1000 MG/1
CAPSULE ORAL
Qty: 120 CAPSULE | Refills: 4 | Status: SHIPPED | OUTPATIENT
Start: 2022-10-10 | End: 2023-05-19 | Stop reason: SDUPTHER

## 2022-10-14 ENCOUNTER — OFFICE VISIT (OUTPATIENT)
Dept: INTERNAL MEDICINE | Age: 47
End: 2022-10-14

## 2022-10-14 VITALS
OXYGEN SATURATION: 97 % | HEART RATE: 74 BPM | TEMPERATURE: 96.2 F | WEIGHT: 189.2 LBS | HEIGHT: 68 IN | SYSTOLIC BLOOD PRESSURE: 100 MMHG | DIASTOLIC BLOOD PRESSURE: 76 MMHG | BODY MASS INDEX: 28.67 KG/M2

## 2022-10-14 DIAGNOSIS — I25.118 CORONARY ARTERY DISEASE OF NATIVE ARTERY OF NATIVE HEART WITH STABLE ANGINA PECTORIS (CMD): Primary | ICD-10-CM

## 2022-10-14 DIAGNOSIS — I25.2 HISTORY OF MYOCARDIAL INFARCTION: ICD-10-CM

## 2022-10-14 DIAGNOSIS — I10 ESSENTIAL HYPERTENSION: ICD-10-CM

## 2022-10-14 DIAGNOSIS — R79.89 ABNORMAL LFTS: ICD-10-CM

## 2022-10-14 DIAGNOSIS — I21.4 NSTEMI (NON-ST ELEVATED MYOCARDIAL INFARCTION) (CMD): ICD-10-CM

## 2022-10-14 DIAGNOSIS — Z85.71 H/O HODGKIN'S LYMPHOMA: ICD-10-CM

## 2022-10-14 DIAGNOSIS — R93.89 ABNORMAL CT SCAN, NECK: ICD-10-CM

## 2022-10-14 DIAGNOSIS — E78.00 PURE HYPERCHOLESTEROLEMIA: ICD-10-CM

## 2022-10-14 PROBLEM — I25.10 CORONARY ARTERY DISEASE: Status: ACTIVE | Noted: 2022-10-14

## 2022-10-14 PROCEDURE — 3078F DIAST BP <80 MM HG: CPT | Performed by: INTERNAL MEDICINE

## 2022-10-14 PROCEDURE — 3074F SYST BP LT 130 MM HG: CPT | Performed by: INTERNAL MEDICINE

## 2022-10-14 PROCEDURE — 99215 OFFICE O/P EST HI 40 MIN: CPT | Performed by: INTERNAL MEDICINE

## 2022-10-14 RX ORDER — FAMOTIDINE 20 MG/1
20 TABLET, FILM COATED ORAL 2 TIMES DAILY
Qty: 60 TABLET | Refills: 1 | Status: SHIPPED | OUTPATIENT
Start: 2022-10-14 | End: 2022-10-14

## 2022-10-14 RX ORDER — CALCIUM CARBONATE 750 MG/1
1 TABLET, CHEWABLE ORAL
COMMUNITY
Start: 2022-10-09 | End: 2023-04-28 | Stop reason: ALTCHOICE

## 2022-10-14 RX ORDER — NALOXONE HYDROCHLORIDE 4 MG/.1ML
4 SPRAY NASAL
COMMUNITY
End: 2022-11-21 | Stop reason: SDUPTHER

## 2022-10-14 RX ORDER — ATORVASTATIN CALCIUM 20 MG/1
20 TABLET, FILM COATED ORAL DAILY
Qty: 90 TABLET | Refills: 1 | Status: SHIPPED | OUTPATIENT
Start: 2022-10-14 | End: 2023-02-06 | Stop reason: ALTCHOICE

## 2022-10-14 RX ORDER — ISOSORBIDE MONONITRATE 30 MG/1
30 TABLET, EXTENDED RELEASE ORAL
COMMUNITY
Start: 2022-10-10 | End: 2022-11-02 | Stop reason: SDUPTHER

## 2022-10-14 RX ORDER — CLOPIDOGREL BISULFATE 75 MG/1
1 TABLET ORAL DAILY
COMMUNITY
Start: 2022-10-10 | End: 2022-11-02 | Stop reason: SDUPTHER

## 2022-10-14 RX ORDER — ASPIRIN 81 MG/1
1 TABLET ORAL DAILY
COMMUNITY
Start: 2022-10-10 | End: 2023-02-06 | Stop reason: ALTCHOICE

## 2022-10-14 RX ORDER — BACLOFEN 10 MG/1
10 TABLET ORAL
COMMUNITY
End: 2023-04-28 | Stop reason: ALTCHOICE

## 2022-10-14 RX ORDER — TIZANIDINE HYDROCHLORIDE 4 MG/1
4 CAPSULE, GELATIN COATED ORAL
COMMUNITY
End: 2022-11-21 | Stop reason: CLARIF

## 2022-10-14 RX ORDER — FAMOTIDINE 20 MG/1
TABLET, FILM COATED ORAL
Qty: 180 TABLET | Refills: 0 | Status: SHIPPED | OUTPATIENT
Start: 2022-10-14 | End: 2023-01-11 | Stop reason: SDUPTHER

## 2022-10-14 ASSESSMENT — PATIENT HEALTH QUESTIONNAIRE - PHQ9
2. FEELING DOWN, DEPRESSED OR HOPELESS: NEARLY EVERY DAY
SUM OF ALL RESPONSES TO PHQ QUESTIONS 1-9: 19
6. FEELING BAD ABOUT YOURSELF - OR THAT YOU ARE A FAILURE OR HAVE LET YOURSELF OR YOUR FAMILY DOWN: NEARLY EVERY DAY
SUM OF ALL RESPONSES TO PHQ9 QUESTIONS 1 AND 2: 6
CLINICAL INTERPRETATION OF PHQ2 SCORE: FURTHER SCREENING NEEDED
9. THOUGHTS THAT YOU WOULD BE BETTER OFF DEAD, OR OF HURTING YOURSELF: NOT AT ALL
7. TROUBLE CONCENTRATING ON THINGS, SUCH AS READING THE NEWSPAPER OR WATCHING TELEVISION: NEARLY EVERY DAY
1. LITTLE INTEREST OR PLEASURE IN DOING THINGS: NEARLY EVERY DAY
3. TROUBLE FALLING OR STAYING ASLEEP OR SLEEPING TOO MUCH: NEARLY EVERY DAY
10. IF YOU CHECKED OFF ANY PROBLEMS, HOW DIFFICULT HAVE THESE PROBLEMS MADE IT FOR YOU TO DO YOUR WORK, TAKE CARE OF THINGS AT HOME, OR GET ALONG WITH OTHER PEOPLE: VERY DIFFICULT
SUM OF ALL RESPONSES TO PHQ9 QUESTIONS 1 AND 2: 6
8. MOVING OR SPEAKING SO SLOWLY THAT OTHER PEOPLE COULD HAVE NOTICED. OR THE OPPOSITE, BEING SO FIGETY OR RESTLESS THAT YOU HAVE BEEN MOVING AROUND A LOT MORE THAN USUAL: NOT AT ALL
CLINICAL INTERPRETATION OF PHQ9 SCORE: MODERATELY SEVERE DEPRESSION
5. POOR APPETITE, WEIGHT LOSS, OR OVEREATING: SEVERAL DAYS
4. FEELING TIRED OR HAVING LITTLE ENERGY: NEARLY EVERY DAY

## 2022-10-14 ASSESSMENT — PAIN SCALES - GENERAL: PAINLEVEL: 0

## 2022-10-19 ENCOUNTER — TELEPHONE (OUTPATIENT)
Dept: INTERNAL MEDICINE | Age: 47
End: 2022-10-19

## 2022-10-20 RX ORDER — ATORVASTATIN CALCIUM 10 MG/1
10 TABLET, FILM COATED ORAL DAILY
Qty: 90 TABLET | Refills: 1 | OUTPATIENT
Start: 2022-10-20

## 2022-10-24 ENCOUNTER — TELEPHONE (OUTPATIENT)
Dept: INTERNAL MEDICINE | Age: 47
End: 2022-10-24

## 2022-10-25 ENCOUNTER — APPOINTMENT (OUTPATIENT)
Dept: OTOLARYNGOLOGY | Age: 47
End: 2022-10-25

## 2022-10-26 ENCOUNTER — V-VISIT (OUTPATIENT)
Dept: NUTRITION | Age: 47
End: 2022-10-26

## 2022-10-26 DIAGNOSIS — E66.3 OVERWEIGHT: ICD-10-CM

## 2022-10-26 PROCEDURE — 99211 OFF/OP EST MAY X REQ PHY/QHP: CPT | Performed by: DIETITIAN, REGISTERED

## 2022-10-27 ENCOUNTER — TELEPHONE (OUTPATIENT)
Dept: GASTROENTEROLOGY | Age: 47
End: 2022-10-27

## 2022-10-27 DIAGNOSIS — R12 HEARTBURN: ICD-10-CM

## 2022-10-27 DIAGNOSIS — R74.8 LIVER ENZYME ELEVATION: Primary | ICD-10-CM

## 2022-11-01 ENCOUNTER — APPOINTMENT (OUTPATIENT)
Dept: LAB | Age: 47
End: 2022-11-01

## 2022-11-01 ENCOUNTER — APPOINTMENT (OUTPATIENT)
Dept: OTOLARYNGOLOGY | Age: 47
End: 2022-11-01
Attending: INTERNAL MEDICINE

## 2022-11-02 RX ORDER — CLOPIDOGREL BISULFATE 75 MG/1
75 TABLET ORAL DAILY
Qty: 90 TABLET | Refills: 1 | Status: SHIPPED | OUTPATIENT
Start: 2022-11-02 | End: 2023-01-30

## 2022-11-02 RX ORDER — ISOSORBIDE MONONITRATE 30 MG/1
30 TABLET, EXTENDED RELEASE ORAL DAILY
Qty: 90 TABLET | Refills: 1 | Status: SHIPPED | OUTPATIENT
Start: 2022-11-02 | End: 2023-01-30

## 2022-11-03 ENCOUNTER — APPOINTMENT (OUTPATIENT)
Dept: GASTROENTEROLOGY | Age: 47
End: 2022-11-03
Attending: INTERNAL MEDICINE

## 2022-11-07 ENCOUNTER — E-ADVICE (OUTPATIENT)
Dept: INTERNAL MEDICINE | Age: 47
End: 2022-11-07

## 2022-11-08 ENCOUNTER — OFFICE VISIT (OUTPATIENT)
Dept: OTOLARYNGOLOGY | Age: 47
End: 2022-11-08
Attending: INTERNAL MEDICINE

## 2022-11-08 ENCOUNTER — LAB SERVICES (OUTPATIENT)
Dept: LAB | Age: 47
End: 2022-11-08

## 2022-11-08 DIAGNOSIS — R22.1 LOCALIZED SWELLING, MASS AND LUMP, NECK: Primary | ICD-10-CM

## 2022-11-08 DIAGNOSIS — Z85.71 H/O HODGKIN'S LYMPHOMA: ICD-10-CM

## 2022-11-08 DIAGNOSIS — R93.89 ABNORMAL CT SCAN, NECK: ICD-10-CM

## 2022-11-08 DIAGNOSIS — R22.1 NECK MASS: Primary | ICD-10-CM

## 2022-11-08 LAB
ALBUMIN SERPL-MCNC: 4.2 G/DL (ref 3.6–5.1)
ALBUMIN/GLOB SERPL: 1.1 {RATIO} (ref 1–2.4)
ALP SERPL-CCNC: 185 UNITS/L (ref 45–117)
ALT SERPL-CCNC: 58 UNITS/L
ANION GAP SERPL CALC-SCNC: 15 MMOL/L (ref 7–19)
AST SERPL-CCNC: 22 UNITS/L
BASOPHILS # BLD: 0 K/MCL (ref 0–0.3)
BASOPHILS NFR BLD: 1 %
BILIRUB SERPL-MCNC: 0.3 MG/DL (ref 0.2–1)
BUN SERPL-MCNC: 15 MG/DL (ref 6–20)
BUN/CREAT SERPL: 14 (ref 7–25)
CALCIUM SERPL-MCNC: 9.5 MG/DL (ref 8.4–10.2)
CALCIUM SERPL-MCNC: 9.6 MG/DL (ref 8.4–10.2)
CHLORIDE SERPL-SCNC: 107 MMOL/L (ref 97–110)
CO2 SERPL-SCNC: 28 MMOL/L (ref 21–32)
CREAT SERPL-MCNC: 1.09 MG/DL (ref 0.67–1.17)
DEPRECATED RDW RBC: 43.9 FL (ref 39–50)
EOSINOPHIL # BLD: 0.2 K/MCL (ref 0–0.5)
EOSINOPHIL NFR BLD: 3 %
ERYTHROCYTE [DISTWIDTH] IN BLOOD: 12.8 % (ref 11–15)
FASTING DURATION TIME PATIENT: ABNORMAL H
GFR SERPLBLD BASED ON 1.73 SQ M-ARVRAT: 84 ML/MIN
GLOBULIN SER-MCNC: 3.9 G/DL (ref 2–4)
GLUCOSE SERPL-MCNC: 101 MG/DL (ref 70–99)
HCT VFR BLD CALC: 44.2 % (ref 39–51)
HGB BLD-MCNC: 15 G/DL (ref 13–17)
IMM GRANULOCYTES # BLD AUTO: 0 K/MCL (ref 0–0.2)
IMM GRANULOCYTES # BLD: 0 %
LDH SERPL L TO P-CCNC: 174 UNITS/L (ref 86–234)
LYMPHOCYTES # BLD: 2.4 K/MCL (ref 1–4.8)
LYMPHOCYTES NFR BLD: 40 %
MCH RBC QN AUTO: 31.3 PG (ref 26–34)
MCHC RBC AUTO-ENTMCNC: 33.9 G/DL (ref 32–36.5)
MCV RBC AUTO: 92.1 FL (ref 78–100)
MONOCYTES # BLD: 0.4 K/MCL (ref 0.3–0.9)
MONOCYTES NFR BLD: 6 %
NEUTROPHILS # BLD: 3 K/MCL (ref 1.8–7.7)
NEUTROPHILS NFR BLD: 50 %
PLATELET # BLD AUTO: 167 K/MCL (ref 140–450)
POTASSIUM SERPL-SCNC: 4.4 MMOL/L (ref 3.4–5.1)
PROT SERPL-MCNC: 8.1 G/DL (ref 6.4–8.2)
RBC # BLD: 4.8 MIL/MCL (ref 4.5–5.9)
SODIUM SERPL-SCNC: 146 MMOL/L (ref 135–145)
WBC # BLD: 6 K/MCL (ref 4.2–11)

## 2022-11-08 PROCEDURE — 80053 COMPREHEN METABOLIC PANEL: CPT | Performed by: INTERNAL MEDICINE

## 2022-11-08 PROCEDURE — 83970 ASSAY OF PARATHORMONE: CPT | Performed by: INTERNAL MEDICINE

## 2022-11-08 PROCEDURE — 83615 LACTATE (LD) (LDH) ENZYME: CPT | Performed by: INTERNAL MEDICINE

## 2022-11-08 PROCEDURE — 36415 COLL VENOUS BLD VENIPUNCTURE: CPT | Performed by: INTERNAL MEDICINE

## 2022-11-08 PROCEDURE — 99204 OFFICE O/P NEW MOD 45 MIN: CPT | Performed by: OTOLARYNGOLOGY

## 2022-11-08 PROCEDURE — 85025 COMPLETE CBC W/AUTO DIFF WBC: CPT | Performed by: INTERNAL MEDICINE

## 2022-11-08 PROCEDURE — 31575 DIAGNOSTIC LARYNGOSCOPY: CPT | Performed by: OTOLARYNGOLOGY

## 2022-11-09 LAB — PTH-INTACT SERPL-MCNC: 36 PG/ML (ref 19–88)

## 2022-11-14 ENCOUNTER — APPOINTMENT (OUTPATIENT)
Dept: CT IMAGING | Age: 47
End: 2022-11-14
Attending: INTERNAL MEDICINE

## 2022-11-14 ENCOUNTER — IMAGING SERVICES (OUTPATIENT)
Dept: CT IMAGING | Age: 47
End: 2022-11-14
Attending: INTERNAL MEDICINE

## 2022-11-14 DIAGNOSIS — Z85.71 H/O HODGKIN'S LYMPHOMA: ICD-10-CM

## 2022-11-14 PROCEDURE — 71260 CT THORAX DX C+: CPT | Performed by: RADIOLOGY

## 2022-11-14 PROCEDURE — 74177 CT ABD & PELVIS W/CONTRAST: CPT | Performed by: RADIOLOGY

## 2022-11-14 PROCEDURE — X1094 NO CHARGE VISIT: HCPCS

## 2022-11-16 ENCOUNTER — APPOINTMENT (OUTPATIENT)
Dept: INFUSION THERAPY | Age: 47
End: 2022-11-16
Attending: INTERNAL MEDICINE

## 2022-11-18 ENCOUNTER — OFFICE VISIT (OUTPATIENT)
Dept: INFUSION THERAPY | Age: 47
End: 2022-11-18
Attending: INTERNAL MEDICINE

## 2022-11-18 ENCOUNTER — NURSE ONLY (OUTPATIENT)
Dept: INFUSION THERAPY | Age: 47
End: 2022-11-18

## 2022-11-18 VITALS
RESPIRATION RATE: 18 BRPM | TEMPERATURE: 97.5 F | HEART RATE: 76 BPM | HEIGHT: 68 IN | DIASTOLIC BLOOD PRESSURE: 84 MMHG | SYSTOLIC BLOOD PRESSURE: 128 MMHG | BODY MASS INDEX: 29.22 KG/M2 | OXYGEN SATURATION: 98 % | WEIGHT: 192.79 LBS

## 2022-11-18 DIAGNOSIS — Z85.71 H/O HODGKIN'S LYMPHOMA: Primary | ICD-10-CM

## 2022-11-18 DIAGNOSIS — R91.1 PULMONARY NODULE: ICD-10-CM

## 2022-11-18 DIAGNOSIS — Z92.21 HX ANTINEOPLASTIC CHEMO: ICD-10-CM

## 2022-11-18 DIAGNOSIS — T45.1X5A CHEMOTHERAPY-INDUCED NEUROPATHY (CMD): ICD-10-CM

## 2022-11-18 DIAGNOSIS — G62.0 CHEMOTHERAPY-INDUCED NEUROPATHY (CMD): ICD-10-CM

## 2022-11-18 DIAGNOSIS — R93.89 ABNORMAL FINDING ON IMAGING: ICD-10-CM

## 2022-11-18 PROCEDURE — 99211 OFF/OP EST MAY X REQ PHY/QHP: CPT

## 2022-11-18 PROCEDURE — 10002800 HB RX 250 W HCPCS: Performed by: INTERNAL MEDICINE

## 2022-11-18 PROCEDURE — 36591 DRAW BLOOD OFF VENOUS DEVICE: CPT

## 2022-11-18 PROCEDURE — 99213 OFFICE O/P EST LOW 20 MIN: CPT | Performed by: INTERNAL MEDICINE

## 2022-11-18 PROCEDURE — 3074F SYST BP LT 130 MM HG: CPT | Performed by: INTERNAL MEDICINE

## 2022-11-18 PROCEDURE — 3079F DIAST BP 80-89 MM HG: CPT | Performed by: INTERNAL MEDICINE

## 2022-11-18 RX ORDER — HEPARIN 100 UNIT/ML
5 SYRINGE INTRAVENOUS PRN
OUTPATIENT
Start: 2022-11-18

## 2022-11-18 RX ORDER — HEPARIN 100 UNIT/ML
5 SYRINGE INTRAVENOUS PRN
Status: DISCONTINUED | OUTPATIENT
Start: 2022-11-18 | End: 2022-11-18 | Stop reason: HOSPADM

## 2022-11-18 RX ADMIN — Medication 500 UNITS: at 11:15

## 2022-11-19 DIAGNOSIS — M54.50 CHRONIC BILATERAL LOW BACK PAIN WITHOUT SCIATICA: ICD-10-CM

## 2022-11-19 DIAGNOSIS — M79.7 FIBROMYALGIA: ICD-10-CM

## 2022-11-19 DIAGNOSIS — M79.10 MYALGIA: ICD-10-CM

## 2022-11-19 DIAGNOSIS — G62.0 PERIPHERAL NEUROPATHY DUE TO CHEMOTHERAPY (CMD): ICD-10-CM

## 2022-11-19 DIAGNOSIS — T45.1X5A PERIPHERAL NEUROPATHY DUE TO CHEMOTHERAPY (CMD): ICD-10-CM

## 2022-11-19 DIAGNOSIS — G89.29 CHRONIC BILATERAL LOW BACK PAIN WITHOUT SCIATICA: ICD-10-CM

## 2022-11-21 ENCOUNTER — TELEPHONE (OUTPATIENT)
Dept: INTERNAL MEDICINE | Age: 47
End: 2022-11-21

## 2022-11-21 RX ORDER — PREGABALIN 75 MG/1
75 CAPSULE ORAL 2 TIMES DAILY
Qty: 60 CAPSULE | Refills: 2 | OUTPATIENT
Start: 2022-11-21 | End: 2022-12-21

## 2022-11-30 ENCOUNTER — OFFICE VISIT (OUTPATIENT)
Dept: PAIN MANAGEMENT | Age: 47
End: 2022-11-30

## 2022-11-30 VITALS — BODY MASS INDEX: 29.19 KG/M2 | WEIGHT: 192 LBS

## 2022-11-30 DIAGNOSIS — M54.50 CHRONIC BILATERAL LOW BACK PAIN WITHOUT SCIATICA: ICD-10-CM

## 2022-11-30 DIAGNOSIS — M79.10 MYALGIA: ICD-10-CM

## 2022-11-30 DIAGNOSIS — G62.0 PERIPHERAL NEUROPATHY DUE TO CHEMOTHERAPY (CMD): ICD-10-CM

## 2022-11-30 DIAGNOSIS — G89.29 CHRONIC BILATERAL LOW BACK PAIN WITHOUT SCIATICA: ICD-10-CM

## 2022-11-30 DIAGNOSIS — T45.1X5A PERIPHERAL NEUROPATHY DUE TO CHEMOTHERAPY (CMD): ICD-10-CM

## 2022-11-30 DIAGNOSIS — M79.7 FIBROMYALGIA: Primary | ICD-10-CM

## 2022-11-30 PROCEDURE — 99213 OFFICE O/P EST LOW 20 MIN: CPT | Performed by: PEDIATRICS

## 2022-11-30 RX ORDER — PREGABALIN 75 MG/1
75 CAPSULE ORAL 2 TIMES DAILY
Qty: 60 CAPSULE | Refills: 2 | Status: SHIPPED | OUTPATIENT
Start: 2022-11-30 | End: 2022-12-23 | Stop reason: SDUPTHER

## 2022-12-06 ENCOUNTER — APPOINTMENT (OUTPATIENT)
Dept: INTERNAL MEDICINE | Age: 47
End: 2022-12-06

## 2022-12-07 ENCOUNTER — IMAGING SERVICES (OUTPATIENT)
Dept: ULTRASOUND IMAGING | Age: 47
End: 2022-12-07
Attending: OTOLARYNGOLOGY

## 2022-12-07 DIAGNOSIS — R22.1 NECK MASS: ICD-10-CM

## 2022-12-07 PROCEDURE — 76536 US EXAM OF HEAD AND NECK: CPT | Performed by: STUDENT IN AN ORGANIZED HEALTH CARE EDUCATION/TRAINING PROGRAM

## 2022-12-07 RX ORDER — TIZANIDINE 4 MG/1
4 TABLET ORAL 2 TIMES DAILY
Qty: 60 TABLET | Refills: 5 | Status: SHIPPED | OUTPATIENT
Start: 2022-12-07 | End: 2023-02-06 | Stop reason: ALTCHOICE

## 2022-12-13 ENCOUNTER — TELEPHONE (OUTPATIENT)
Dept: TELEHEALTH | Age: 47
End: 2022-12-13

## 2022-12-14 ENCOUNTER — TELEPHONE (OUTPATIENT)
Dept: NUTRITION | Age: 47
End: 2022-12-14

## 2022-12-14 ENCOUNTER — BEHAVIORAL HEALTH (OUTPATIENT)
Dept: BEHAVIORAL HEALTH | Age: 47
End: 2022-12-14

## 2022-12-14 DIAGNOSIS — F33.0 MILD EPISODE OF RECURRENT MAJOR DEPRESSIVE DISORDER (CMD): Primary | ICD-10-CM

## 2022-12-14 PROCEDURE — 90791 PSYCH DIAGNOSTIC EVALUATION: CPT | Performed by: PSYCHOLOGIST

## 2022-12-16 ENCOUNTER — TELEPHONE (OUTPATIENT)
Dept: INTERNAL MEDICINE | Age: 47
End: 2022-12-16

## 2022-12-16 ENCOUNTER — OFFICE VISIT (OUTPATIENT)
Dept: FAMILY MEDICINE | Age: 47
End: 2022-12-16

## 2022-12-16 VITALS
OXYGEN SATURATION: 96 % | HEIGHT: 68 IN | TEMPERATURE: 96.8 F | RESPIRATION RATE: 16 BRPM | HEART RATE: 72 BPM | BODY MASS INDEX: 29.72 KG/M2 | SYSTOLIC BLOOD PRESSURE: 110 MMHG | WEIGHT: 196.1 LBS | DIASTOLIC BLOOD PRESSURE: 82 MMHG

## 2022-12-16 DIAGNOSIS — M79.7 FIBROMYALGIA: ICD-10-CM

## 2022-12-16 DIAGNOSIS — Z23 NEED FOR INFLUENZA VACCINATION: ICD-10-CM

## 2022-12-16 DIAGNOSIS — M54.50 CHRONIC BILATERAL LOW BACK PAIN WITHOUT SCIATICA: ICD-10-CM

## 2022-12-16 DIAGNOSIS — Z23 NEED FOR TETANUS BOOSTER: ICD-10-CM

## 2022-12-16 DIAGNOSIS — T45.1X5A PERIPHERAL NEUROPATHY DUE TO CHEMOTHERAPY (CMD): ICD-10-CM

## 2022-12-16 DIAGNOSIS — I10 ESSENTIAL HYPERTENSION: ICD-10-CM

## 2022-12-16 DIAGNOSIS — G62.0 PERIPHERAL NEUROPATHY DUE TO CHEMOTHERAPY (CMD): ICD-10-CM

## 2022-12-16 DIAGNOSIS — I25.10 CORONARY ARTERY DISEASE INVOLVING NATIVE HEART, UNSPECIFIED VESSEL OR LESION TYPE, UNSPECIFIED WHETHER ANGINA PRESENT: ICD-10-CM

## 2022-12-16 DIAGNOSIS — G89.29 CHRONIC BILATERAL LOW BACK PAIN WITHOUT SCIATICA: ICD-10-CM

## 2022-12-16 DIAGNOSIS — I25.2 HISTORY OF MYOCARDIAL INFARCTION: Primary | ICD-10-CM

## 2022-12-16 DIAGNOSIS — R53.1 WEAKNESS: ICD-10-CM

## 2022-12-16 DIAGNOSIS — F43.10 PTSD (POST-TRAUMATIC STRESS DISORDER): ICD-10-CM

## 2022-12-16 DIAGNOSIS — F51.01 PRIMARY INSOMNIA: ICD-10-CM

## 2022-12-16 PROCEDURE — 90686 IIV4 VACC NO PRSV 0.5 ML IM: CPT

## 2022-12-16 PROCEDURE — 3074F SYST BP LT 130 MM HG: CPT

## 2022-12-16 PROCEDURE — 90715 TDAP VACCINE 7 YRS/> IM: CPT

## 2022-12-16 PROCEDURE — G0008 ADMIN INFLUENZA VIRUS VAC: HCPCS

## 2022-12-16 PROCEDURE — 3079F DIAST BP 80-89 MM HG: CPT

## 2022-12-16 PROCEDURE — 90472 IMMUNIZATION ADMIN EACH ADD: CPT

## 2022-12-16 PROCEDURE — 99214 OFFICE O/P EST MOD 30 MIN: CPT

## 2022-12-16 ASSESSMENT — ENCOUNTER SYMPTOMS
CONSTIPATION: 0
LIGHT-HEADEDNESS: 0
DIARRHEA: 0
PSYCHIATRIC NEGATIVE: 1
ABDOMINAL PAIN: 0
FEVER: 0
RHINORRHEA: 0
NAUSEA: 0
FATIGUE: 0
CHILLS: 0
UNEXPECTED WEIGHT CHANGE: 0
DIZZINESS: 0
SHORTNESS OF BREATH: 0
VOMITING: 0

## 2022-12-16 ASSESSMENT — PATIENT HEALTH QUESTIONNAIRE - PHQ9
CLINICAL INTERPRETATION OF PHQ2 SCORE: NO FURTHER SCREENING NEEDED
1. LITTLE INTEREST OR PLEASURE IN DOING THINGS: NOT AT ALL
SUM OF ALL RESPONSES TO PHQ9 QUESTIONS 1 AND 2: 0
SUM OF ALL RESPONSES TO PHQ9 QUESTIONS 1 AND 2: 0
2. FEELING DOWN, DEPRESSED OR HOPELESS: NOT AT ALL

## 2022-12-17 ENCOUNTER — V-VISIT (OUTPATIENT)
Dept: NUTRITION | Age: 47
End: 2022-12-17

## 2022-12-17 ENCOUNTER — TELEPHONE (OUTPATIENT)
Dept: BEHAVIORAL HEALTH | Age: 47
End: 2022-12-17

## 2022-12-17 DIAGNOSIS — R63.4 WEIGHT LOSS: Primary | ICD-10-CM

## 2022-12-17 PROCEDURE — 99211 OFF/OP EST MAY X REQ PHY/QHP: CPT | Performed by: DIETITIAN, REGISTERED

## 2022-12-19 ENCOUNTER — TELEPHONE (OUTPATIENT)
Dept: CARE COORDINATION | Age: 47
End: 2022-12-19

## 2022-12-23 DIAGNOSIS — T45.1X5A PERIPHERAL NEUROPATHY DUE TO CHEMOTHERAPY (CMD): ICD-10-CM

## 2022-12-23 DIAGNOSIS — M79.10 MYALGIA: ICD-10-CM

## 2022-12-23 DIAGNOSIS — G62.0 PERIPHERAL NEUROPATHY DUE TO CHEMOTHERAPY (CMD): ICD-10-CM

## 2022-12-23 DIAGNOSIS — M79.7 FIBROMYALGIA: ICD-10-CM

## 2022-12-23 DIAGNOSIS — G89.29 CHRONIC BILATERAL LOW BACK PAIN WITHOUT SCIATICA: ICD-10-CM

## 2022-12-23 DIAGNOSIS — M54.50 CHRONIC BILATERAL LOW BACK PAIN WITHOUT SCIATICA: ICD-10-CM

## 2022-12-23 RX ORDER — CLONIDINE HYDROCHLORIDE 0.1 MG/1
0.1 TABLET ORAL 2 TIMES DAILY
Qty: 60 TABLET | Refills: 5 | Status: SHIPPED | OUTPATIENT
Start: 2022-12-23 | End: 2023-04-27 | Stop reason: SDUPTHER

## 2022-12-23 RX ORDER — CLONIDINE HYDROCHLORIDE 0.1 MG/1
TABLET ORAL
Qty: 60 TABLET | Refills: 5 | Status: SHIPPED | OUTPATIENT
Start: 2022-12-23 | End: 2022-12-23 | Stop reason: SDUPTHER

## 2022-12-23 RX ORDER — PREGABALIN 75 MG/1
75 CAPSULE ORAL 2 TIMES DAILY
Qty: 60 CAPSULE | Refills: 2 | Status: SHIPPED | OUTPATIENT
Start: 2022-12-23 | End: 2023-03-31 | Stop reason: SDUPTHER

## 2022-12-28 ENCOUNTER — APPOINTMENT (OUTPATIENT)
Dept: BEHAVIORAL HEALTH | Age: 47
End: 2022-12-28

## 2022-12-29 RX ORDER — IBUPROFEN 800 MG/1
800 TABLET ORAL EVERY 8 HOURS PRN
Qty: 30 TABLET | Refills: 3 | Status: SHIPPED | OUTPATIENT
Start: 2022-12-29 | End: 2023-06-16 | Stop reason: SDUPTHER

## 2023-01-05 ENCOUNTER — TELEPHONE (OUTPATIENT)
Dept: INTERNAL MEDICINE | Age: 48
End: 2023-01-05

## 2023-01-09 ENCOUNTER — TELEPHONE (OUTPATIENT)
Dept: GASTROENTEROLOGY | Age: 48
End: 2023-01-09

## 2023-01-12 RX ORDER — FAMOTIDINE 20 MG/1
TABLET, FILM COATED ORAL
Qty: 180 TABLET | Refills: 1 | Status: SHIPPED | OUTPATIENT
Start: 2023-01-12 | End: 2023-06-27 | Stop reason: ALTCHOICE

## 2023-01-18 ENCOUNTER — TELEPHONE (OUTPATIENT)
Dept: INTERNAL MEDICINE | Age: 48
End: 2023-01-18

## 2023-01-20 ENCOUNTER — EXTERNAL RECORD (OUTPATIENT)
Dept: HEALTH INFORMATION MANAGEMENT | Facility: OTHER | Age: 48
End: 2023-01-20

## 2023-01-23 ENCOUNTER — V-VISIT (OUTPATIENT)
Dept: NUTRITION | Age: 48
End: 2023-01-23

## 2023-01-23 DIAGNOSIS — E66.3 OVERWEIGHT: ICD-10-CM

## 2023-01-23 PROCEDURE — 97802 MEDICAL NUTRITION INDIV IN: CPT | Performed by: INTERNAL MEDICINE

## 2023-01-24 ENCOUNTER — TELEPHONE (OUTPATIENT)
Dept: GASTROENTEROLOGY | Age: 48
End: 2023-01-24

## 2023-01-30 ENCOUNTER — APPOINTMENT (OUTPATIENT)
Dept: BEHAVIORAL HEALTH | Age: 48
End: 2023-01-30

## 2023-01-30 ENCOUNTER — TELEPHONE (OUTPATIENT)
Dept: PAIN MANAGEMENT | Age: 48
End: 2023-01-30

## 2023-01-30 RX ORDER — CLOPIDOGREL BISULFATE 75 MG/1
75 TABLET ORAL DAILY
Qty: 90 TABLET | Refills: 1 | Status: SHIPPED | OUTPATIENT
Start: 2023-01-30

## 2023-01-30 RX ORDER — ISOSORBIDE MONONITRATE 30 MG/1
30 TABLET, EXTENDED RELEASE ORAL DAILY
Qty: 90 TABLET | Refills: 1 | Status: SHIPPED | OUTPATIENT
Start: 2023-01-30

## 2023-01-30 RX ORDER — ATORVASTATIN CALCIUM 20 MG/1
20 TABLET, FILM COATED ORAL DAILY
Qty: 90 TABLET | Refills: 1 | Status: CANCELLED | OUTPATIENT
Start: 2023-01-30

## 2023-02-01 ENCOUNTER — OFFICE VISIT (OUTPATIENT)
Dept: PAIN MANAGEMENT | Age: 48
End: 2023-02-01

## 2023-02-01 DIAGNOSIS — M54.50 CHRONIC BILATERAL LOW BACK PAIN WITHOUT SCIATICA: ICD-10-CM

## 2023-02-01 DIAGNOSIS — Z85.71 H/O HODGKIN'S LYMPHOMA: ICD-10-CM

## 2023-02-01 DIAGNOSIS — M79.7 FIBROMYALGIA: Primary | ICD-10-CM

## 2023-02-01 DIAGNOSIS — G62.0 PERIPHERAL NEUROPATHY DUE TO CHEMOTHERAPY (CMD): ICD-10-CM

## 2023-02-01 DIAGNOSIS — T45.1X5A PERIPHERAL NEUROPATHY DUE TO CHEMOTHERAPY (CMD): ICD-10-CM

## 2023-02-01 DIAGNOSIS — F43.10 PTSD (POST-TRAUMATIC STRESS DISORDER): ICD-10-CM

## 2023-02-01 DIAGNOSIS — G89.29 CHRONIC BILATERAL LOW BACK PAIN WITHOUT SCIATICA: ICD-10-CM

## 2023-02-01 DIAGNOSIS — F90.0 ATTENTION DEFICIT HYPERACTIVITY DISORDER (ADHD), PREDOMINANTLY INATTENTIVE TYPE: ICD-10-CM

## 2023-02-01 PROCEDURE — 99213 OFFICE O/P EST LOW 20 MIN: CPT | Performed by: PEDIATRICS

## 2023-02-02 ENCOUNTER — TELEPHONE (OUTPATIENT)
Dept: SPORTS MEDICINE | Age: 48
End: 2023-02-02

## 2023-02-06 ENCOUNTER — IMAGING SERVICES (OUTPATIENT)
Dept: GENERAL RADIOLOGY | Age: 48
End: 2023-02-06
Attending: INTERNAL MEDICINE

## 2023-02-06 ENCOUNTER — OFFICE VISIT (OUTPATIENT)
Dept: SPORTS MEDICINE | Age: 48
End: 2023-02-06
Attending: PEDIATRICS

## 2023-02-06 ENCOUNTER — TELEPHONE (OUTPATIENT)
Dept: BEHAVIORAL HEALTH | Age: 48
End: 2023-02-06

## 2023-02-06 VITALS
BODY MASS INDEX: 29.55 KG/M2 | WEIGHT: 195 LBS | DIASTOLIC BLOOD PRESSURE: 62 MMHG | HEIGHT: 68 IN | HEART RATE: 70 BPM | SYSTOLIC BLOOD PRESSURE: 90 MMHG

## 2023-02-06 DIAGNOSIS — M25.562 LEFT KNEE PAIN, UNSPECIFIED CHRONICITY: ICD-10-CM

## 2023-02-06 DIAGNOSIS — M17.12 PATELLOFEMORAL ARTHRITIS OF LEFT KNEE: Primary | ICD-10-CM

## 2023-02-06 DIAGNOSIS — G89.29 CHRONIC PAIN OF LEFT KNEE: ICD-10-CM

## 2023-02-06 DIAGNOSIS — M25.562 CHRONIC PAIN OF LEFT KNEE: ICD-10-CM

## 2023-02-06 DIAGNOSIS — M25.362 KNEE INSTABILITY, LEFT: ICD-10-CM

## 2023-02-06 PROCEDURE — 73564 X-RAY EXAM KNEE 4 OR MORE: CPT | Performed by: RADIOLOGY

## 2023-02-06 PROCEDURE — 3074F SYST BP LT 130 MM HG: CPT | Performed by: INTERNAL MEDICINE

## 2023-02-06 PROCEDURE — 99204 OFFICE O/P NEW MOD 45 MIN: CPT | Performed by: INTERNAL MEDICINE

## 2023-02-06 PROCEDURE — 3078F DIAST BP <80 MM HG: CPT | Performed by: INTERNAL MEDICINE

## 2023-02-13 ASSESSMENT — PATIENT HEALTH QUESTIONNAIRE - PHQ9
CLINICAL INTERPRETATION OF PHQ9 SCORE: MILD DEPRESSION
1. LITTLE INTEREST OR PLEASURE IN DOING THINGS: NOT AT ALL
3. TROUBLE FALLING OR STAYING ASLEEP OR SLEEPING TOO MUCH: SEVERAL DAYS
SUM OF ALL RESPONSES TO PHQ9 QUESTIONS 1 AND 2: 1
CLINICAL INTERPRETATION OF PHQ2 SCORE: NO FURTHER SCREENING NEEDED
SUM OF ALL RESPONSES TO PHQ QUESTIONS 1-9: 5
6. FEELING BAD ABOUT YOURSELF - OR THAT YOU ARE A FAILURE OR HAVE LET YOURSELF OR YOUR FAMILY DOWN: SEVERAL DAYS
8. MOVING OR SPEAKING SO SLOWLY THAT OTHER PEOPLE COULD HAVE NOTICED. OR THE OPPOSITE, BEING SO FIGETY OR RESTLESS THAT YOU HAVE BEEN MOVING AROUND A LOT MORE THAN USUAL: NOT AT ALL
9. THOUGHTS THAT YOU WOULD BE BETTER OFF DEAD, OR OF HURTING YOURSELF: NOT AT ALL
2. FEELING DOWN, DEPRESSED OR HOPELESS: SEVERAL DAYS
7. TROUBLE CONCENTRATING ON THINGS, SUCH AS READING THE NEWSPAPER OR WATCHING TELEVISION: NOT AT ALL
4. FEELING TIRED OR HAVING LITTLE ENERGY: SEVERAL DAYS
5. POOR APPETITE OR OVEREATING: SEVERAL DAYS

## 2023-02-13 ASSESSMENT — ANXIETY QUESTIONNAIRES
5. BEING SO RESTLESS THAT IT IS HARD TO SIT STILL: 0 - NOT AT ALL
4. TROUBLE RELAXING: SEVERAL DAYS
3. WORRYING TOO MUCH ABOUT DIFFERENT THINGS: SEVERAL DAYS
1. FEELING NERVOUS, ANXIOUS, OR ON EDGE: 0 - NOT AT ALL
IF YOU CHECKED OFF ANY PROBLEMS ON THIS QUESTIONNAIRE, HOW DIFFICULT HAVE THESE PROBLEMS MADE IT FOR YOU TO DO YOUR WORK, TAKE CARE OF THINGS AT HOME, OR GET ALONG WITH OTHER PEOPLE: SOMEWHAT DIFFICULT
IF YOU CHECKED OFF ANY PROBLEMS ON THIS QUESTIONNAIRE, HOW DIFFICULT HAVE THESE PROBLEMS MADE IT FOR YOU TO DO YOUR WORK, TAKE CARE OF THINGS AT HOME, OR GET ALONG WITH OTHER PEOPLE: SOMEWHAT DIFFICULT
4. TROUBLE RELAXING: 1 - SEVERAL DAYS
7. FEELING AFRAID AS IF SOMETHING AWFUL MIGHT HAPPEN: SEVERAL DAYS
7. FEELING AFRAID AS IF SOMETHING AWFUL MIGHT HAPPEN: 1 - SEVERAL DAYS
3. WORRYING TOO MUCH ABOUT DIFFERENT THINGS: 1 - SEVERAL DAYS
1. FEELING NERVOUS, ANXIOUS, OR ON EDGE: NOT AT ALL
2. NOT BEING ABLE TO STOP OR CONTROL WORRYING: NOT AT ALL
2. NOT BEING ABLE TO STOP OR CONTROL WORRYING: 0 - NOT AT ALL
6. BECOMING EASILY ANNOYED OR IRRITABLE: 0 - NOT AT ALL
GAD7 TOTAL SCORE: 3
GAD7 TOTAL SCORE: 3
5. BEING SO RESTLESS THAT IT IS HARD TO SIT STILL: NOT AT ALL
6. BECOMING EASILY ANNOYED OR IRRITABLE: NOT AT ALL

## 2023-02-13 ASSESSMENT — COLUMBIA-SUICIDE SEVERITY RATING SCALE - C-SSRS
6. IN YOUR LIFETIME, HAVE YOU EVER DONE ANYTHING, STARTED TO DO ANYTHING, OR PREPARED TO DO ANYTHING TO END YOUR LIFE?: NO
1. IN THE PAST MONTH, HAVE YOU WISHED YOU WERE DEAD OR WISHED YOU COULD GO TO SLEEP AND NOT WAKE UP?: NO
2. HAVE YOU ACTUALLY HAD ANY THOUGHTS OF KILLING YOURSELF?: NO

## 2023-02-14 ENCOUNTER — BEHAVIORAL HEALTH (OUTPATIENT)
Dept: BEHAVIORAL HEALTH | Age: 48
End: 2023-02-14

## 2023-02-14 ENCOUNTER — APPOINTMENT (OUTPATIENT)
Dept: PHYSICAL THERAPY | Age: 48
End: 2023-02-14

## 2023-02-14 DIAGNOSIS — F33.0 MILD EPISODE OF RECURRENT MAJOR DEPRESSIVE DISORDER (CMD): Primary | ICD-10-CM

## 2023-02-14 DIAGNOSIS — R41.89 COGNITIVE DEFICITS: ICD-10-CM

## 2023-02-14 PROCEDURE — 90792 PSYCH DIAG EVAL W/MED SRVCS: CPT | Performed by: PHYSICIAN ASSISTANT

## 2023-02-14 RX ORDER — AMITRIPTYLINE HYDROCHLORIDE 100 MG/1
100 TABLET ORAL NIGHTLY
Qty: 30 TABLET | Refills: 1 | Status: SHIPPED | OUTPATIENT
Start: 2023-02-14 | End: 2023-03-07 | Stop reason: SDUPTHER

## 2023-02-14 ASSESSMENT — ENCOUNTER SYMPTOMS
SHORTNESS OF BREATH: 1
CONSTIPATION: 0
HEADACHES: 0
APPETITE CHANGE: 1
UNEXPECTED WEIGHT CHANGE: 0
BACK PAIN: 1
ABDOMINAL PAIN: 1
DIARRHEA: 0
LIGHT-HEADEDNESS: 0
DIZZINESS: 0
NAUSEA: 0

## 2023-02-17 ENCOUNTER — OFFICE VISIT (OUTPATIENT)
Dept: PHYSICAL THERAPY | Age: 48
End: 2023-02-17

## 2023-02-17 ENCOUNTER — APPOINTMENT (OUTPATIENT)
Dept: BEHAVIORAL HEALTH | Age: 48
End: 2023-02-17

## 2023-02-17 DIAGNOSIS — M25.362 KNEE INSTABILITY, LEFT: Primary | ICD-10-CM

## 2023-02-17 DIAGNOSIS — R29.898 MUSCULAR DECONDITIONING: ICD-10-CM

## 2023-02-17 DIAGNOSIS — R26.89 BALANCE PROBLEM: ICD-10-CM

## 2023-02-17 DIAGNOSIS — R29.898 WEAKNESS OF BOTH HIPS: ICD-10-CM

## 2023-02-17 PROCEDURE — 97110 THERAPEUTIC EXERCISES: CPT | Performed by: PHYSICAL THERAPIST

## 2023-02-17 PROCEDURE — 97161 PT EVAL LOW COMPLEX 20 MIN: CPT | Performed by: PHYSICAL THERAPIST

## 2023-02-17 ASSESSMENT — ENCOUNTER SYMPTOMS
ALLEVIATING FACTORS: AVOIDING MOVEMENT IN INVOLVED AREA
PAIN SEVERITY NOW: 0
ALLEVIATING FACTORS: CHANGE IN POSITION
ALLEVIATING FACTORS: OVER-THE-COUNTER MEDICATION
QUALITY: ACHE

## 2023-02-20 ENCOUNTER — APPOINTMENT (OUTPATIENT)
Dept: PHYSICAL THERAPY | Age: 48
End: 2023-02-20

## 2023-02-20 ENCOUNTER — APPOINTMENT (OUTPATIENT)
Dept: INFUSION THERAPY | Age: 48
End: 2023-02-20
Attending: INTERNAL MEDICINE

## 2023-02-20 PROBLEM — R29.898 WEAKNESS OF BOTH HIPS: Status: ACTIVE | Noted: 2023-02-20

## 2023-02-20 PROBLEM — R26.89 BALANCE PROBLEM: Status: ACTIVE | Noted: 2023-02-20

## 2023-02-20 PROBLEM — R29.898 MUSCULAR DECONDITIONING: Status: ACTIVE | Noted: 2023-02-20

## 2023-02-20 ASSESSMENT — MOVEMENT AND STRENGTH ASSESSMENTS
WALKING BETWEEN ROOMS: MODERATE DIFFICULTY
WALKING A MILE: MODERATE DIFFICULTY
MAKING SHARP TURNS WHILE RUNNING FAST: EXTREME DIFFICULTY OR UNABLE TO PERFORM ACTIVITY
SITTING FOR 1 HOUR: EXTREME DIFFICULTY OR UNABLE TO PERFORM ACTIVITY
GETTING INTO OR OUT OF THE BATH: QUITE A BIT OF DIFFICULTY
ROLLING OVER IN BED: EXTREME DIFFICULTY OR UNABLE TO PERFORM ACTIVITY
SQUATTING: EXTREME DIFFICULTY OR UNABLE TO PERFORM ACTIVITY
GETTING INTO OR OUT OF A CAR: QUITE A BIT OF DIFFICULTY
YOUR USUAL HOBBIES, RECREATIONAL OR SPORTING ACTIVIITIES: QUITE A BIT OF DIFFICULTY
RUNNING ON UNEVEN GROUND: EXTREME DIFFICULTY OR UNABLE TO PERFORM ACTIVITY
PUTTING ON YOUR SHOES OR SOCKS: QUITE A BIT OF DIFFICULTY
TOTAL SCORE: 21.25
ANY OF YOUR USUAL WORK, HOUSEWORK OR SCHOOL ACTIVITIES: EXTREME DIFFICULTY OR UNABLE TO PERFORM ACTIVITY
GOING UP OR DOWN 10 STAIRS (ABOUT 1 FLIGHT OF STAIRS): EXTREME DIFFICULTY OR UNABLE TO PERFORM ACTIVITY
PERFORMING HEAVY ACTIVITIES AROUND YOUR HOME: MODERATE DIFFICULTY
WALKING 2 BLOCKS: MODERATE DIFFICULTY
HOPPING: EXTREME DIFFICULTY OR UNABLE TO PERFORM ACTIVITY
LIFTING AN OBJECT, LIKE A BAG OF GROCERIES, FROM THE FLOOR: A LITTLE BIT OF DIFFICULTY
STANDING FOR 1 HOUR: EXTREME DIFFICULTY OR UNABLE TO PERFORM ACTIVITY
RUNNING ON EVEN GROUND: EXTREME DIFFICULTY OR UNABLE TO PERFORM ACTIVITY
PERFORMING LIGHT ACTIVITES AROUND YOUR HOME: MODERATE DIFFICULTY

## 2023-02-20 ASSESSMENT — ENCOUNTER SYMPTOMS: PAIN LOCATION: BILATERAL LOWER EXTREMITIES

## 2023-02-24 ENCOUNTER — TELEPHONE (OUTPATIENT)
Dept: PHYSICAL THERAPY | Age: 48
End: 2023-02-24

## 2023-03-01 ENCOUNTER — V-VISIT (OUTPATIENT)
Dept: NUTRITION | Age: 48
End: 2023-03-01

## 2023-03-01 DIAGNOSIS — R63.5 WEIGHT GAIN: ICD-10-CM

## 2023-03-01 PROCEDURE — 99211 OFF/OP EST MAY X REQ PHY/QHP: CPT | Performed by: DIETITIAN, REGISTERED

## 2023-03-03 ENCOUNTER — APPOINTMENT (OUTPATIENT)
Dept: PHYSICAL THERAPY | Age: 48
End: 2023-03-03

## 2023-03-06 ENCOUNTER — OFFICE VISIT (OUTPATIENT)
Dept: PHYSICAL THERAPY | Age: 48
End: 2023-03-06

## 2023-03-06 DIAGNOSIS — R29.898 MUSCULAR DECONDITIONING: ICD-10-CM

## 2023-03-06 DIAGNOSIS — R26.89 BALANCE PROBLEM: ICD-10-CM

## 2023-03-06 DIAGNOSIS — R29.898 WEAKNESS OF BOTH HIPS: ICD-10-CM

## 2023-03-06 DIAGNOSIS — M25.362 KNEE INSTABILITY, LEFT: Primary | ICD-10-CM

## 2023-03-06 PROCEDURE — 97110 THERAPEUTIC EXERCISES: CPT | Performed by: PHYSICAL THERAPIST

## 2023-03-06 PROCEDURE — 97112 NEUROMUSCULAR REEDUCATION: CPT | Performed by: PHYSICAL THERAPIST

## 2023-03-07 ENCOUNTER — ALCOHOL/DRUG ABUSE (OUTPATIENT)
Dept: BEHAVIORAL HEALTH | Age: 48
End: 2023-03-07

## 2023-03-07 DIAGNOSIS — F33.0 MILD EPISODE OF RECURRENT MAJOR DEPRESSIVE DISORDER (CMD): Primary | ICD-10-CM

## 2023-03-07 DIAGNOSIS — F12.10 CANNABIS ABUSE, DAILY USE: ICD-10-CM

## 2023-03-07 PROCEDURE — 99214 OFFICE O/P EST MOD 30 MIN: CPT | Performed by: PHYSICIAN ASSISTANT

## 2023-03-07 RX ORDER — AMITRIPTYLINE HYDROCHLORIDE 100 MG/1
100 TABLET ORAL NIGHTLY
Qty: 30 TABLET | Refills: 1 | Status: SHIPPED | OUTPATIENT
Start: 2023-03-07 | End: 2023-03-30 | Stop reason: SDUPTHER

## 2023-03-13 ENCOUNTER — OFFICE VISIT (OUTPATIENT)
Dept: PHYSICAL THERAPY | Age: 48
End: 2023-03-13

## 2023-03-13 DIAGNOSIS — R26.89 BALANCE PROBLEM: ICD-10-CM

## 2023-03-13 DIAGNOSIS — M25.362 KNEE INSTABILITY, LEFT: Primary | ICD-10-CM

## 2023-03-13 DIAGNOSIS — R29.898 WEAKNESS OF BOTH HIPS: ICD-10-CM

## 2023-03-13 DIAGNOSIS — R29.898 MUSCULAR DECONDITIONING: ICD-10-CM

## 2023-03-13 PROCEDURE — 97110 THERAPEUTIC EXERCISES: CPT | Performed by: PHYSICAL THERAPIST

## 2023-03-16 ENCOUNTER — OFFICE VISIT (OUTPATIENT)
Dept: PHYSICAL THERAPY | Age: 48
End: 2023-03-16

## 2023-03-16 ENCOUNTER — BEHAVIORAL HEALTH (OUTPATIENT)
Dept: BEHAVIORAL HEALTH | Age: 48
End: 2023-03-16

## 2023-03-16 DIAGNOSIS — F33.0 MILD EPISODE OF RECURRENT MAJOR DEPRESSIVE DISORDER (CMD): Primary | ICD-10-CM

## 2023-03-16 DIAGNOSIS — R26.89 BALANCE PROBLEM: ICD-10-CM

## 2023-03-16 DIAGNOSIS — M25.362 KNEE INSTABILITY, LEFT: Primary | ICD-10-CM

## 2023-03-16 DIAGNOSIS — R29.898 MUSCULAR DECONDITIONING: ICD-10-CM

## 2023-03-16 DIAGNOSIS — R29.898 WEAKNESS OF BOTH HIPS: ICD-10-CM

## 2023-03-16 PROCEDURE — 97110 THERAPEUTIC EXERCISES: CPT | Performed by: PHYSICAL THERAPIST

## 2023-03-16 PROCEDURE — 90832 PSYTX W PT 30 MINUTES: CPT | Performed by: PSYCHOLOGIST

## 2023-03-16 PROCEDURE — 97112 NEUROMUSCULAR REEDUCATION: CPT | Performed by: PHYSICAL THERAPIST

## 2023-03-18 ENCOUNTER — APPOINTMENT (OUTPATIENT)
Dept: BEHAVIORAL HEALTH | Age: 48
End: 2023-03-18
Attending: INTERNAL MEDICINE

## 2023-03-20 ENCOUNTER — OFFICE VISIT (OUTPATIENT)
Dept: SPORTS MEDICINE | Age: 48
End: 2023-03-20

## 2023-03-20 VITALS
WEIGHT: 180 LBS | HEART RATE: 66 BPM | BODY MASS INDEX: 27.28 KG/M2 | SYSTOLIC BLOOD PRESSURE: 120 MMHG | HEIGHT: 68 IN | DIASTOLIC BLOOD PRESSURE: 64 MMHG

## 2023-03-20 DIAGNOSIS — M17.12 PATELLOFEMORAL ARTHRITIS OF LEFT KNEE: ICD-10-CM

## 2023-03-20 DIAGNOSIS — M25.562 CHRONIC PAIN OF LEFT KNEE: ICD-10-CM

## 2023-03-20 DIAGNOSIS — G89.29 CHRONIC PAIN OF LEFT KNEE: ICD-10-CM

## 2023-03-20 DIAGNOSIS — M25.362 KNEE INSTABILITY, LEFT: Primary | ICD-10-CM

## 2023-03-20 PROCEDURE — 3078F DIAST BP <80 MM HG: CPT | Performed by: INTERNAL MEDICINE

## 2023-03-20 PROCEDURE — 3074F SYST BP LT 130 MM HG: CPT | Performed by: INTERNAL MEDICINE

## 2023-03-20 PROCEDURE — 99214 OFFICE O/P EST MOD 30 MIN: CPT | Performed by: INTERNAL MEDICINE

## 2023-03-21 RX ORDER — ATORVASTATIN CALCIUM 10 MG/1
10 TABLET, FILM COATED ORAL DAILY
Qty: 90 TABLET | Refills: 1 | OUTPATIENT
Start: 2023-03-21

## 2023-03-22 ENCOUNTER — APPOINTMENT (OUTPATIENT)
Dept: PHYSICAL THERAPY | Age: 48
End: 2023-03-22

## 2023-03-24 ENCOUNTER — TELEPHONE (OUTPATIENT)
Dept: PHYSICAL THERAPY | Age: 48
End: 2023-03-24

## 2023-03-24 ENCOUNTER — APPOINTMENT (OUTPATIENT)
Dept: PHYSICAL THERAPY | Age: 48
End: 2023-03-24

## 2023-03-30 ENCOUNTER — TELEPHONE (OUTPATIENT)
Dept: BEHAVIORAL HEALTH | Age: 48
End: 2023-03-30

## 2023-03-30 RX ORDER — AMITRIPTYLINE HYDROCHLORIDE 100 MG/1
100 TABLET ORAL NIGHTLY
Qty: 30 TABLET | Refills: 1 | Status: CANCELLED | OUTPATIENT
Start: 2023-03-30

## 2023-03-31 DIAGNOSIS — G89.29 CHRONIC BILATERAL LOW BACK PAIN WITHOUT SCIATICA: ICD-10-CM

## 2023-03-31 DIAGNOSIS — M79.10 MYALGIA: ICD-10-CM

## 2023-03-31 DIAGNOSIS — G62.0 PERIPHERAL NEUROPATHY DUE TO CHEMOTHERAPY (CMD): ICD-10-CM

## 2023-03-31 DIAGNOSIS — T45.1X5A PERIPHERAL NEUROPATHY DUE TO CHEMOTHERAPY (CMD): ICD-10-CM

## 2023-03-31 DIAGNOSIS — M54.50 CHRONIC BILATERAL LOW BACK PAIN WITHOUT SCIATICA: ICD-10-CM

## 2023-03-31 DIAGNOSIS — M79.7 FIBROMYALGIA: ICD-10-CM

## 2023-03-31 RX ORDER — PREGABALIN 75 MG/1
75 CAPSULE ORAL 2 TIMES DAILY
Qty: 60 CAPSULE | Refills: 2 | Status: SHIPPED | OUTPATIENT
Start: 2023-04-01 | End: 2023-09-08 | Stop reason: SDUPTHER

## 2023-04-02 RX ORDER — AMITRIPTYLINE HYDROCHLORIDE 100 MG/1
100 TABLET ORAL NIGHTLY
Qty: 90 TABLET | Refills: 1 | Status: SHIPPED | OUTPATIENT
Start: 2023-04-02

## 2023-04-04 ENCOUNTER — TELEPHONE (OUTPATIENT)
Dept: PHYSICAL THERAPY | Age: 48
End: 2023-04-04

## 2023-04-04 ENCOUNTER — APPOINTMENT (OUTPATIENT)
Dept: PHYSICAL THERAPY | Age: 48
End: 2023-04-04

## 2023-04-04 RX ORDER — ATORVASTATIN CALCIUM 20 MG/1
20 TABLET, FILM COATED ORAL DAILY
Qty: 90 TABLET | Refills: 1 | OUTPATIENT
Start: 2023-04-04

## 2023-04-06 ENCOUNTER — TELEPHONE (OUTPATIENT)
Dept: BEHAVIORAL HEALTH | Age: 48
End: 2023-04-06

## 2023-04-10 ENCOUNTER — EXTERNAL RECORD (OUTPATIENT)
Dept: HEALTH INFORMATION MANAGEMENT | Facility: OTHER | Age: 48
End: 2023-04-10

## 2023-04-12 ENCOUNTER — TELEPHONE (OUTPATIENT)
Dept: FAMILY MEDICINE | Age: 48
End: 2023-04-12

## 2023-04-13 ENCOUNTER — EXTERNAL RECORD (OUTPATIENT)
Dept: HEALTH INFORMATION MANAGEMENT | Facility: OTHER | Age: 48
End: 2023-04-13

## 2023-04-13 ENCOUNTER — APPOINTMENT (OUTPATIENT)
Dept: PHYSICAL THERAPY | Age: 48
End: 2023-04-13

## 2023-04-14 ENCOUNTER — APPOINTMENT (OUTPATIENT)
Dept: PHYSICAL THERAPY | Age: 48
End: 2023-04-14

## 2023-04-14 ENCOUNTER — TELEPHONE (OUTPATIENT)
Dept: PHYSICAL THERAPY | Age: 48
End: 2023-04-14

## 2023-04-14 DIAGNOSIS — R29.898 WEAKNESS OF BOTH HIPS: ICD-10-CM

## 2023-04-14 DIAGNOSIS — R26.89 BALANCE PROBLEM: ICD-10-CM

## 2023-04-14 DIAGNOSIS — R29.898 MUSCULAR DECONDITIONING: ICD-10-CM

## 2023-04-14 DIAGNOSIS — M25.362 KNEE INSTABILITY, LEFT: Primary | ICD-10-CM

## 2023-04-18 ENCOUNTER — BEHAVIORAL HEALTH (OUTPATIENT)
Dept: BEHAVIORAL HEALTH | Age: 48
End: 2023-04-18

## 2023-04-18 DIAGNOSIS — R41.89 COGNITIVE DEFICITS: ICD-10-CM

## 2023-04-18 DIAGNOSIS — F33.0 MILD EPISODE OF RECURRENT MAJOR DEPRESSIVE DISORDER (CMD): Primary | ICD-10-CM

## 2023-04-18 DIAGNOSIS — F12.10 CANNABIS ABUSE, DAILY USE: ICD-10-CM

## 2023-04-18 PROCEDURE — 99214 OFFICE O/P EST MOD 30 MIN: CPT | Performed by: PHYSICIAN ASSISTANT

## 2023-04-18 RX ORDER — ATORVASTATIN CALCIUM 10 MG/1
10 TABLET, FILM COATED ORAL DAILY
COMMUNITY
Start: 2023-03-21 | End: 2023-06-20 | Stop reason: SDUPTHER

## 2023-04-18 RX ORDER — OLANZAPINE 5 MG/1
5 TABLET ORAL NIGHTLY
Qty: 30 TABLET | Refills: 2 | Status: SHIPPED | OUTPATIENT
Start: 2023-04-18 | End: 2023-05-30 | Stop reason: SDUPTHER

## 2023-04-18 RX ORDER — TIZANIDINE 4 MG/1
TABLET ORAL
COMMUNITY
Start: 2023-04-17

## 2023-04-18 ASSESSMENT — PATIENT HEALTH QUESTIONNAIRE - PHQ9
1. LITTLE INTEREST OR PLEASURE IN DOING THINGS: SEVERAL DAYS
7. TROUBLE CONCENTRATING ON THINGS, SUCH AS READING THE NEWSPAPER OR WATCHING TELEVISION: NOT AT ALL
SUM OF ALL RESPONSES TO PHQ9 QUESTIONS 1 AND 2: 2
4. FEELING TIRED OR HAVING LITTLE ENERGY: SEVERAL DAYS
3. TROUBLE FALLING OR STAYING ASLEEP OR SLEEPING TOO MUCH: SEVERAL DAYS
9. THOUGHTS THAT YOU WOULD BE BETTER OFF DEAD, OR OF HURTING YOURSELF: NOT AT ALL
CLINICAL INTERPRETATION OF PHQ9 SCORE: MILD DEPRESSION
2. FEELING DOWN, DEPRESSED OR HOPELESS: SEVERAL DAYS
SUM OF ALL RESPONSES TO PHQ QUESTIONS 1-9: 6
5. POOR APPETITE OR OVEREATING: NOT AT ALL
8. MOVING OR SPEAKING SO SLOWLY THAT OTHER PEOPLE COULD HAVE NOTICED. OR THE OPPOSITE, BEING SO FIGETY OR RESTLESS THAT YOU HAVE BEEN MOVING AROUND A LOT MORE THAN USUAL: NOT AT ALL
CLINICAL INTERPRETATION OF PHQ2 SCORE: NO FURTHER SCREENING NEEDED
6. FEELING BAD ABOUT YOURSELF - OR THAT YOU ARE A FAILURE OR HAVE LET YOURSELF OR YOUR FAMILY DOWN: MORE THAN HALF THE DAYS

## 2023-04-18 ASSESSMENT — ANXIETY QUESTIONNAIRES
3. WORRYING TOO MUCH ABOUT DIFFERENT THINGS: 1 - SEVERAL DAYS
1. FEELING NERVOUS, ANXIOUS, OR ON EDGE: SEVERAL DAYS
7. FEELING AFRAID AS IF SOMETHING AWFUL MIGHT HAPPEN: 1 - SEVERAL DAYS
2. NOT BEING ABLE TO STOP OR CONTROL WORRYING: SEVERAL DAYS
6. BECOMING EASILY ANNOYED OR IRRITABLE: 2 - MORE THAN HALF THE DAYS
7. FEELING AFRAID AS IF SOMETHING AWFUL MIGHT HAPPEN: SEVERAL DAYS
IF YOU CHECKED OFF ANY PROBLEMS ON THIS QUESTIONNAIRE, HOW DIFFICULT HAVE THESE PROBLEMS MADE IT FOR YOU TO DO YOUR WORK, TAKE CARE OF THINGS AT HOME, OR GET ALONG WITH OTHER PEOPLE: SOMEWHAT DIFFICULT
GAD7 TOTAL SCORE: 9
3. WORRYING TOO MUCH ABOUT DIFFERENT THINGS: SEVERAL DAYS
IF YOU CHECKED OFF ANY PROBLEMS ON THIS QUESTIONNAIRE, HOW DIFFICULT HAVE THESE PROBLEMS MADE IT FOR YOU TO DO YOUR WORK, TAKE CARE OF THINGS AT HOME, OR GET ALONG WITH OTHER PEOPLE: SOMEWHAT DIFFICULT
1. FEELING NERVOUS, ANXIOUS, OR ON EDGE: 1 - SEVERAL DAYS
5. BEING SO RESTLESS THAT IT IS HARD TO SIT STILL: 2 - MORE THAN HALF THE DAYS
GAD7 TOTAL SCORE: 9
4. TROUBLE RELAXING: 1 - SEVERAL DAYS
5. BEING SO RESTLESS THAT IT IS HARD TO SIT STILL: MORE THAN HALF THE DAYS
4. TROUBLE RELAXING: SEVERAL DAYS
6. BECOMING EASILY ANNOYED OR IRRITABLE: MORE THAN HALF THE DAYS
2. NOT BEING ABLE TO STOP OR CONTROL WORRYING: 1 - SEVERAL DAYS

## 2023-04-18 ASSESSMENT — COLUMBIA-SUICIDE SEVERITY RATING SCALE - C-SSRS
1. IN THE PAST MONTH, HAVE YOU WISHED YOU WERE DEAD OR WISHED YOU COULD GO TO SLEEP AND NOT WAKE UP?: NO
2. HAVE YOU ACTUALLY HAD ANY THOUGHTS OF KILLING YOURSELF?: NO
6. IN YOUR LIFETIME, HAVE YOU EVER DONE ANYTHING, STARTED TO DO ANYTHING, OR PREPARED TO DO ANYTHING TO END YOUR LIFE?: NO

## 2023-04-19 ENCOUNTER — TELEPHONE (OUTPATIENT)
Dept: PHYSICAL THERAPY | Age: 48
End: 2023-04-19

## 2023-04-19 ENCOUNTER — APPOINTMENT (OUTPATIENT)
Dept: CT IMAGING | Age: 48
End: 2023-04-19
Attending: INTERNAL MEDICINE

## 2023-04-19 ENCOUNTER — IMAGING SERVICES (OUTPATIENT)
Dept: CT IMAGING | Age: 48
End: 2023-04-19
Attending: INTERNAL MEDICINE

## 2023-04-19 DIAGNOSIS — Z85.71 H/O HODGKIN'S LYMPHOMA: ICD-10-CM

## 2023-04-19 DIAGNOSIS — M25.362 KNEE INSTABILITY, LEFT: Primary | ICD-10-CM

## 2023-04-19 PROCEDURE — 71260 CT THORAX DX C+: CPT | Performed by: RADIOLOGY

## 2023-04-19 PROCEDURE — 70491 CT SOFT TISSUE NECK W/DYE: CPT | Performed by: RADIOLOGY

## 2023-04-19 PROCEDURE — X1094 NO CHARGE VISIT: HCPCS | Performed by: RADIOLOGY

## 2023-04-19 PROCEDURE — X1094 NO CHARGE VISIT: HCPCS | Performed by: INTERNAL MEDICINE

## 2023-04-19 PROCEDURE — 74177 CT ABD & PELVIS W/CONTRAST: CPT | Performed by: RADIOLOGY

## 2023-04-20 ENCOUNTER — OFFICE VISIT (OUTPATIENT)
Dept: PHYSICAL THERAPY | Age: 48
End: 2023-04-20

## 2023-04-20 DIAGNOSIS — M25.362 KNEE INSTABILITY, LEFT: Primary | ICD-10-CM

## 2023-04-20 DIAGNOSIS — R26.89 BALANCE PROBLEM: ICD-10-CM

## 2023-04-20 DIAGNOSIS — R29.898 MUSCULAR DECONDITIONING: ICD-10-CM

## 2023-04-20 DIAGNOSIS — R29.898 WEAKNESS OF BOTH HIPS: ICD-10-CM

## 2023-04-20 PROCEDURE — 97112 NEUROMUSCULAR REEDUCATION: CPT | Performed by: PHYSICAL THERAPIST

## 2023-04-20 PROCEDURE — 97110 THERAPEUTIC EXERCISES: CPT | Performed by: PHYSICAL THERAPIST

## 2023-04-20 PROCEDURE — 97161 PT EVAL LOW COMPLEX 20 MIN: CPT | Performed by: PHYSICAL THERAPIST

## 2023-04-20 ASSESSMENT — MOVEMENT AND STRENGTH ASSESSMENTS
SITTING FOR 1 HOUR: NO DIFFICULTY
RUNNING ON EVEN GROUND: EXTREME DIFFICULTY OR UNABLE TO PERFORM ACTIVITY
LIFTING AN OBJECT, LIKE A BAG OF GROCERIES, FROM THE FLOOR: EXTREME DIFFICULTY OR UNABLE TO PERFORM ACTIVITY
ANY OF YOUR USUAL WORK, HOUSEWORK OR SCHOOL ACTIVITIES: A LITTLE BIT OF DIFFICULTY
GETTING INTO OR OUT OF THE BATH: EXTREME DIFFICULTY OR UNABLE TO PERFORM ACTIVITY
PUTTING ON YOUR SHOES OR SOCKS: MODERATE DIFFICULTY
ROLLING OVER IN BED: EXTREME DIFFICULTY OR UNABLE TO PERFORM ACTIVITY
GOING UP OR DOWN 10 STAIRS (ABOUT 1 FLIGHT OF STAIRS): EXTREME DIFFICULTY OR UNABLE TO PERFORM ACTIVITY
PERFORMING LIGHT ACTIVITES AROUND YOUR HOME: NO DIFFICULTY
PERFORMING HEAVY ACTIVITIES AROUND YOUR HOME: EXTREME DIFFICULTY OR UNABLE TO PERFORM ACTIVITY
HOPPING: EXTREME DIFFICULTY OR UNABLE TO PERFORM ACTIVITY
WALKING 2 BLOCKS: EXTREME DIFFICULTY OR UNABLE TO PERFORM ACTIVITY
GETTING INTO OR OUT OF A CAR: MODERATE DIFFICULTY
SQUATTING: EXTREME DIFFICULTY OR UNABLE TO PERFORM ACTIVITY
STANDING FOR 1 HOUR: MODERATE DIFFICULTY
WALKING A MILE: EXTREME DIFFICULTY OR UNABLE TO PERFORM ACTIVITY
RUNNING ON UNEVEN GROUND: EXTREME DIFFICULTY OR UNABLE TO PERFORM ACTIVITY
MAKING SHARP TURNS WHILE RUNNING FAST: EXTREME DIFFICULTY OR UNABLE TO PERFORM ACTIVITY
WALKING BETWEEN ROOMS: NO DIFFICULTY
TOTAL SCORE: 30
YOUR USUAL HOBBIES, RECREATIONAL OR SPORTING ACTIVIITIES: A LITTLE BIT OF DIFFICULTY

## 2023-04-20 ASSESSMENT — ENCOUNTER SYMPTOMS
ALLEVIATING FACTORS: AVOIDING MOVEMENT IN INVOLVED AREA
PAIN SCALE AT HIGHEST: 8
ALLEVIATING FACTORS: ICE
PAIN FREQUENCY: INTERMITTENT
QUALITY: SORE
QUALITY: POPPING / CLICKING
QUALITY: ACHE
PAIN SEVERITY NOW: 4
QUALITY: STIFF
PAIN LOCATION: LEFT LATERAL KNEE
PAIN SCALE AT LOWEST: 0

## 2023-04-21 ENCOUNTER — OFFICE VISIT (OUTPATIENT)
Dept: SPORTS MEDICINE | Age: 48
End: 2023-04-21

## 2023-04-21 VITALS
WEIGHT: 197 LBS | DIASTOLIC BLOOD PRESSURE: 66 MMHG | HEIGHT: 68 IN | SYSTOLIC BLOOD PRESSURE: 126 MMHG | HEART RATE: 60 BPM | BODY MASS INDEX: 29.86 KG/M2

## 2023-04-21 DIAGNOSIS — G89.29 CHRONIC PAIN OF LEFT KNEE: ICD-10-CM

## 2023-04-21 DIAGNOSIS — M25.562 CHRONIC PAIN OF LEFT KNEE: ICD-10-CM

## 2023-04-21 DIAGNOSIS — M17.12 PATELLOFEMORAL ARTHRITIS OF LEFT KNEE: Primary | ICD-10-CM

## 2023-04-21 PROCEDURE — 3074F SYST BP LT 130 MM HG: CPT | Performed by: INTERNAL MEDICINE

## 2023-04-21 PROCEDURE — 20611 DRAIN/INJ JOINT/BURSA W/US: CPT | Performed by: INTERNAL MEDICINE

## 2023-04-21 PROCEDURE — 99213 OFFICE O/P EST LOW 20 MIN: CPT | Performed by: INTERNAL MEDICINE

## 2023-04-21 PROCEDURE — 3078F DIAST BP <80 MM HG: CPT | Performed by: INTERNAL MEDICINE

## 2023-04-21 RX ORDER — LIDOCAINE HYDROCHLORIDE 10 MG/ML
3 INJECTION, SOLUTION INFILTRATION; PERINEURAL
Status: COMPLETED | OUTPATIENT
Start: 2023-04-21 | End: 2023-04-21

## 2023-04-21 RX ORDER — TRIAMCINOLONE ACETONIDE 40 MG/ML
40 INJECTION, SUSPENSION INTRA-ARTICULAR; INTRAMUSCULAR
Status: COMPLETED | OUTPATIENT
Start: 2023-04-21 | End: 2023-04-21

## 2023-04-21 RX ADMIN — TRIAMCINOLONE ACETONIDE 40 MG: 40 INJECTION, SUSPENSION INTRA-ARTICULAR; INTRAMUSCULAR at 13:58

## 2023-04-21 RX ADMIN — LIDOCAINE HYDROCHLORIDE 3 ML: 10 INJECTION, SOLUTION INFILTRATION; PERINEURAL at 13:58

## 2023-04-22 ENCOUNTER — BEHAVIORAL HEALTH (OUTPATIENT)
Dept: BEHAVIORAL HEALTH | Age: 48
End: 2023-04-22

## 2023-04-22 DIAGNOSIS — F33.41 RECURRENT MAJOR DEPRESSIVE DISORDER, IN PARTIAL REMISSION (CMD): Primary | ICD-10-CM

## 2023-04-22 PROCEDURE — 90837 PSYTX W PT 60 MINUTES: CPT | Performed by: PSYCHOLOGIST

## 2023-04-25 ENCOUNTER — V-VISIT (OUTPATIENT)
Dept: NUTRITION | Age: 48
End: 2023-04-25

## 2023-04-25 DIAGNOSIS — R63.5 WEIGHT GAIN: ICD-10-CM

## 2023-04-25 PROCEDURE — 99211 OFF/OP EST MAY X REQ PHY/QHP: CPT | Performed by: DIETITIAN, REGISTERED

## 2023-04-27 ENCOUNTER — TELEPHONE (OUTPATIENT)
Dept: CT IMAGING | Age: 48
End: 2023-04-27

## 2023-04-27 ENCOUNTER — IMAGING SERVICES (OUTPATIENT)
Dept: GENERAL RADIOLOGY | Age: 48
End: 2023-04-27
Attending: INTERNAL MEDICINE

## 2023-04-27 ENCOUNTER — OFFICE VISIT (OUTPATIENT)
Dept: INTERNAL MEDICINE | Age: 48
End: 2023-04-27

## 2023-04-27 ENCOUNTER — LAB SERVICES (OUTPATIENT)
Dept: LAB | Age: 48
End: 2023-04-27

## 2023-04-27 VITALS
BODY MASS INDEX: 29.19 KG/M2 | HEIGHT: 68 IN | WEIGHT: 192.6 LBS | SYSTOLIC BLOOD PRESSURE: 96 MMHG | DIASTOLIC BLOOD PRESSURE: 58 MMHG | HEART RATE: 87 BPM | TEMPERATURE: 97.4 F | OXYGEN SATURATION: 97 %

## 2023-04-27 DIAGNOSIS — M54.50 BACK PAIN OF THORACOLUMBAR REGION: ICD-10-CM

## 2023-04-27 DIAGNOSIS — I10 ESSENTIAL HYPERTENSION: ICD-10-CM

## 2023-04-27 DIAGNOSIS — M54.50 LOW BACK PAIN, UNSPECIFIED BACK PAIN LATERALITY, UNSPECIFIED CHRONICITY, UNSPECIFIED WHETHER SCIATICA PRESENT: ICD-10-CM

## 2023-04-27 DIAGNOSIS — R53.83 MALAISE AND FATIGUE: ICD-10-CM

## 2023-04-27 DIAGNOSIS — M54.6 BACK PAIN OF THORACOLUMBAR REGION: ICD-10-CM

## 2023-04-27 DIAGNOSIS — R73.9 ELEVATED BLOOD SUGAR: ICD-10-CM

## 2023-04-27 DIAGNOSIS — E03.9 HYPOTHYROIDISM, UNSPECIFIED TYPE: ICD-10-CM

## 2023-04-27 DIAGNOSIS — R07.81 RIB PAIN ON RIGHT SIDE: ICD-10-CM

## 2023-04-27 DIAGNOSIS — Z12.5 SPECIAL SCREENING FOR MALIGNANT NEOPLASM OF PROSTATE: ICD-10-CM

## 2023-04-27 DIAGNOSIS — I25.10 CORONARY ARTERY DISEASE INVOLVING NATIVE CORONARY ARTERY OF NATIVE HEART, UNSPECIFIED WHETHER ANGINA PRESENT: ICD-10-CM

## 2023-04-27 DIAGNOSIS — M54.50 LOW BACK PAIN, UNSPECIFIED BACK PAIN LATERALITY, UNSPECIFIED CHRONICITY, UNSPECIFIED WHETHER SCIATICA PRESENT: Primary | ICD-10-CM

## 2023-04-27 DIAGNOSIS — I73.9 PVD (PERIPHERAL VASCULAR DISEASE) (CMD): Primary | ICD-10-CM

## 2023-04-27 DIAGNOSIS — R53.81 MALAISE AND FATIGUE: ICD-10-CM

## 2023-04-27 DIAGNOSIS — R10.9 TRAUMATIC FLANK PAIN: Primary | ICD-10-CM

## 2023-04-27 DIAGNOSIS — E78.00 PURE HYPERCHOLESTEROLEMIA: ICD-10-CM

## 2023-04-27 DIAGNOSIS — M25.362 KNEE INSTABILITY, LEFT: ICD-10-CM

## 2023-04-27 DIAGNOSIS — R74.8 LIVER ENZYME ELEVATION: ICD-10-CM

## 2023-04-27 DIAGNOSIS — R10.9 ACUTE RIGHT FLANK PAIN: ICD-10-CM

## 2023-04-27 DIAGNOSIS — R10.9 RIGHT FLANK PAIN: ICD-10-CM

## 2023-04-27 DIAGNOSIS — I25.118 CORONARY ARTERY DISEASE OF NATIVE ARTERY OF NATIVE HEART WITH STABLE ANGINA PECTORIS (CMD): ICD-10-CM

## 2023-04-27 DIAGNOSIS — Z85.71 H/O HODGKIN'S LYMPHOMA: ICD-10-CM

## 2023-04-27 DIAGNOSIS — K21.9 GASTROESOPHAGEAL REFLUX DISEASE WITHOUT ESOPHAGITIS: ICD-10-CM

## 2023-04-27 LAB
ALBUMIN SERPL-MCNC: 4.1 G/DL (ref 3.6–5.1)
ALBUMIN/GLOB SERPL: 1.2 {RATIO} (ref 1–2.4)
ALP SERPL-CCNC: 181 UNITS/L (ref 45–117)
ALT SERPL-CCNC: 40 UNITS/L
ANION GAP SERPL CALC-SCNC: 14 MMOL/L (ref 7–19)
APPEARANCE UR: ABNORMAL
AST SERPL-CCNC: 22 UNITS/L
BACTERIA #/AREA URNS HPF: ABNORMAL /HPF
BASOPHILS # BLD: 0.1 K/MCL (ref 0–0.3)
BASOPHILS NFR BLD: 1 %
BILIRUB CONJ SERPL-MCNC: 0.1 MG/DL (ref 0–0.2)
BILIRUB SERPL-MCNC: 0.4 MG/DL (ref 0.2–1)
BILIRUB UR QL STRIP: NEGATIVE
BUN SERPL-MCNC: 21 MG/DL (ref 6–20)
BUN/CREAT SERPL: 18 (ref 7–25)
CALCIUM SERPL-MCNC: 8.9 MG/DL (ref 8.4–10.2)
CHLORIDE SERPL-SCNC: 100 MMOL/L (ref 97–110)
CHOLEST SERPL-MCNC: 228 MG/DL
CHOLEST/HDLC SERPL: 6.3 {RATIO}
CO2 SERPL-SCNC: 26 MMOL/L (ref 21–32)
COLOR UR: YELLOW
CREAT SERPL-MCNC: 1.16 MG/DL (ref 0.67–1.17)
DEPRECATED RDW RBC: 44.3 FL (ref 39–50)
EOSINOPHIL # BLD: 0.2 K/MCL (ref 0–0.5)
EOSINOPHIL NFR BLD: 2 %
ERYTHROCYTE [DISTWIDTH] IN BLOOD: 13.2 % (ref 11–15)
ERYTHROCYTE [SEDIMENTATION RATE] IN BLOOD BY WESTERGREN METHOD: 20 MM/HR (ref 0–20)
FASTING DURATION TIME PATIENT: ABNORMAL H
GFR SERPLBLD BASED ON 1.73 SQ M-ARVRAT: 77 ML/MIN
GLOBULIN SER-MCNC: 3.3 G/DL (ref 2–4)
GLUCOSE SERPL-MCNC: 105 MG/DL (ref 70–99)
GLUCOSE UR STRIP-MCNC: NEGATIVE MG/DL
HBA1C MFR BLD: 5.7 % (ref 4.5–5.6)
HCT VFR BLD CALC: 43.4 % (ref 39–51)
HDLC SERPL-MCNC: 36 MG/DL
HGB BLD-MCNC: 14.9 G/DL (ref 13–17)
HGB UR QL STRIP: NEGATIVE
HYALINE CASTS #/AREA URNS LPF: ABNORMAL /LPF
IMM GRANULOCYTES # BLD AUTO: 0.1 K/MCL (ref 0–0.2)
IMM GRANULOCYTES # BLD: 1 %
KETONES UR STRIP-MCNC: NEGATIVE MG/DL
LDH SERPL L TO P-CCNC: 215 UNITS/L (ref 86–234)
LDLC SERPL CALC-MCNC: 112 MG/DL
LEUKOCYTE ESTERASE UR QL STRIP: NEGATIVE
LYMPHOCYTES # BLD: 3.3 K/MCL (ref 1–4.8)
LYMPHOCYTES NFR BLD: 29 %
MCH RBC QN AUTO: 31.6 PG (ref 26–34)
MCHC RBC AUTO-ENTMCNC: 34.3 G/DL (ref 32–36.5)
MCV RBC AUTO: 91.9 FL (ref 78–100)
MONOCYTES # BLD: 0.6 K/MCL (ref 0.3–0.9)
MONOCYTES NFR BLD: 6 %
MUCOUS THREADS URNS QL MICRO: PRESENT
NEUTROPHILS # BLD: 7 K/MCL (ref 1.8–7.7)
NEUTROPHILS NFR BLD: 61 %
NITRITE UR QL STRIP: NEGATIVE
NONHDLC SERPL-MCNC: 192 MG/DL
NRBC BLD MANUAL-RTO: 0 /100 WBC
PH UR STRIP: 5 [PH] (ref 5–7)
PLATELET # BLD AUTO: 231 K/MCL (ref 140–450)
POTASSIUM SERPL-SCNC: 4.8 MMOL/L (ref 3.4–5.1)
PROT SERPL-MCNC: 7.4 G/DL (ref 6.4–8.2)
PROT UR STRIP-MCNC: 30 MG/DL
RBC # BLD: 4.72 MIL/MCL (ref 4.5–5.9)
RBC #/AREA URNS HPF: ABNORMAL /HPF
SODIUM SERPL-SCNC: 135 MMOL/L (ref 135–145)
SP GR UR STRIP: 1.02 (ref 1–1.03)
SQUAMOUS #/AREA URNS HPF: ABNORMAL /HPF
T4 FREE SERPL-MCNC: 0.7 NG/DL (ref 0.8–1.5)
TRIGL SERPL-MCNC: 398 MG/DL
TSH SERPL-ACNC: 10.52 MCUNITS/ML (ref 0.35–5)
UROBILINOGEN UR STRIP-MCNC: 0.2 MG/DL
WBC # BLD: 11.3 K/MCL (ref 4.2–11)
WBC #/AREA URNS HPF: ABNORMAL /HPF

## 2023-04-27 PROCEDURE — 84153 ASSAY OF PSA TOTAL: CPT | Performed by: INTERNAL MEDICINE

## 2023-04-27 PROCEDURE — 80061 LIPID PANEL: CPT | Performed by: INTERNAL MEDICINE

## 2023-04-27 PROCEDURE — 36415 COLL VENOUS BLD VENIPUNCTURE: CPT | Performed by: INTERNAL MEDICINE

## 2023-04-27 PROCEDURE — 72072 X-RAY EXAM THORAC SPINE 3VWS: CPT | Performed by: RADIOLOGY

## 2023-04-27 PROCEDURE — 3074F SYST BP LT 130 MM HG: CPT | Performed by: INTERNAL MEDICINE

## 2023-04-27 PROCEDURE — 81001 URINALYSIS AUTO W/SCOPE: CPT | Performed by: INTERNAL MEDICINE

## 2023-04-27 PROCEDURE — 80050 GENERAL HEALTH PANEL: CPT | Performed by: INTERNAL MEDICINE

## 2023-04-27 PROCEDURE — 71101 X-RAY EXAM UNILAT RIBS/CHEST: CPT | Performed by: RADIOLOGY

## 2023-04-27 PROCEDURE — 85652 RBC SED RATE AUTOMATED: CPT | Performed by: INTERNAL MEDICINE

## 2023-04-27 PROCEDURE — 82248 BILIRUBIN DIRECT: CPT | Performed by: INTERNAL MEDICINE

## 2023-04-27 PROCEDURE — 84439 ASSAY OF FREE THYROXINE: CPT | Performed by: INTERNAL MEDICINE

## 2023-04-27 PROCEDURE — 83615 LACTATE (LD) (LDH) ENZYME: CPT | Performed by: INTERNAL MEDICINE

## 2023-04-27 PROCEDURE — 99215 OFFICE O/P EST HI 40 MIN: CPT | Performed by: INTERNAL MEDICINE

## 2023-04-27 PROCEDURE — 72110 X-RAY EXAM L-2 SPINE 4/>VWS: CPT | Performed by: RADIOLOGY

## 2023-04-27 PROCEDURE — 3078F DIAST BP <80 MM HG: CPT | Performed by: INTERNAL MEDICINE

## 2023-04-27 PROCEDURE — 83036 HEMOGLOBIN GLYCOSYLATED A1C: CPT | Performed by: INTERNAL MEDICINE

## 2023-04-27 RX ORDER — CLONIDINE HYDROCHLORIDE 0.1 MG/1
0.05 TABLET ORAL 2 TIMES DAILY
Qty: 30 TABLET | Refills: 5 | Status: SHIPPED | OUTPATIENT
Start: 2023-04-27 | End: 2023-06-16 | Stop reason: SDUPTHER

## 2023-04-27 ASSESSMENT — PATIENT HEALTH QUESTIONNAIRE - PHQ9
3. TROUBLE FALLING OR STAYING ASLEEP OR SLEEPING TOO MUCH: NEARLY EVERY DAY
SUM OF ALL RESPONSES TO PHQ QUESTIONS 1-9: 21
5. POOR APPETITE, WEIGHT LOSS, OR OVEREATING: NEARLY EVERY DAY
7. TROUBLE CONCENTRATING ON THINGS, SUCH AS READING THE NEWSPAPER OR WATCHING TELEVISION: NEARLY EVERY DAY
9. THOUGHTS THAT YOU WOULD BE BETTER OFF DEAD, OR OF HURTING YOURSELF: NOT AT ALL
6. FEELING BAD ABOUT YOURSELF - OR THAT YOU ARE A FAILURE OR HAVE LET YOURSELF OR YOUR FAMILY DOWN: NEARLY EVERY DAY
CLINICAL INTERPRETATION OF PHQ2 SCORE: FURTHER SCREENING NEEDED
SUM OF ALL RESPONSES TO PHQ9 QUESTIONS 1 AND 2: 6
10. IF YOU CHECKED OFF ANY PROBLEMS, HOW DIFFICULT HAVE THESE PROBLEMS MADE IT FOR YOU TO DO YOUR WORK, TAKE CARE OF THINGS AT HOME, OR GET ALONG WITH OTHER PEOPLE: VERY DIFFICULT
8. MOVING OR SPEAKING SO SLOWLY THAT OTHER PEOPLE COULD HAVE NOTICED. OR THE OPPOSITE, BEING SO FIGETY OR RESTLESS THAT YOU HAVE BEEN MOVING AROUND A LOT MORE THAN USUAL: NOT AT ALL
2. FEELING DOWN, DEPRESSED OR HOPELESS: NEARLY EVERY DAY
CLINICAL INTERPRETATION OF PHQ9 SCORE: SEVERE DEPRESSION
1. LITTLE INTEREST OR PLEASURE IN DOING THINGS: NEARLY EVERY DAY
4. FEELING TIRED OR HAVING LITTLE ENERGY: NEARLY EVERY DAY
SUM OF ALL RESPONSES TO PHQ9 QUESTIONS 1 AND 2: 6

## 2023-04-27 ASSESSMENT — PAIN SCALES - GENERAL: PAINLEVEL: 10

## 2023-04-28 ENCOUNTER — APPOINTMENT (OUTPATIENT)
Dept: INTERNAL MEDICINE | Age: 48
End: 2023-04-28

## 2023-04-28 LAB — PSA SERPL-MCNC: 1.2 NG/ML

## 2023-05-01 DIAGNOSIS — Z13.29 THYROID DISORDER SCREENING: Primary | ICD-10-CM

## 2023-05-04 ENCOUNTER — OFFICE VISIT (OUTPATIENT)
Dept: PHYSICAL THERAPY | Age: 48
End: 2023-05-04

## 2023-05-04 DIAGNOSIS — M25.362 KNEE INSTABILITY, LEFT: Primary | ICD-10-CM

## 2023-05-04 PROCEDURE — 97110 THERAPEUTIC EXERCISES: CPT | Performed by: PHYSICAL THERAPIST

## 2023-05-04 PROCEDURE — 97140 MANUAL THERAPY 1/> REGIONS: CPT | Performed by: PHYSICAL THERAPIST

## 2023-05-08 ENCOUNTER — APPOINTMENT (OUTPATIENT)
Dept: ULTRASOUND IMAGING | Age: 48
End: 2023-05-08

## 2023-05-11 ENCOUNTER — OFFICE VISIT (OUTPATIENT)
Dept: PHYSICAL THERAPY | Age: 48
End: 2023-05-11

## 2023-05-11 DIAGNOSIS — M25.362 KNEE INSTABILITY, LEFT: Primary | ICD-10-CM

## 2023-05-11 PROCEDURE — 97110 THERAPEUTIC EXERCISES: CPT | Performed by: PHYSICAL THERAPIST

## 2023-05-11 PROCEDURE — 97112 NEUROMUSCULAR REEDUCATION: CPT | Performed by: PHYSICAL THERAPIST

## 2023-05-15 ENCOUNTER — IMAGING SERVICES (OUTPATIENT)
Dept: ULTRASOUND IMAGING | Age: 48
End: 2023-05-15
Attending: INTERNAL MEDICINE

## 2023-05-15 DIAGNOSIS — M54.50 BACK PAIN OF THORACOLUMBAR REGION: ICD-10-CM

## 2023-05-15 DIAGNOSIS — R10.9 RIGHT FLANK PAIN: ICD-10-CM

## 2023-05-15 DIAGNOSIS — R10.9 TRAUMATIC FLANK PAIN: ICD-10-CM

## 2023-05-15 DIAGNOSIS — M54.6 BACK PAIN OF THORACOLUMBAR REGION: ICD-10-CM

## 2023-05-15 PROCEDURE — 76770 US EXAM ABDO BACK WALL COMP: CPT | Performed by: RADIOLOGY

## 2023-05-18 ENCOUNTER — APPOINTMENT (OUTPATIENT)
Dept: PHYSICAL THERAPY | Age: 48
End: 2023-05-18

## 2023-05-18 ENCOUNTER — TELEPHONE (OUTPATIENT)
Dept: SPORTS MEDICINE | Age: 48
End: 2023-05-18

## 2023-05-19 ENCOUNTER — APPOINTMENT (OUTPATIENT)
Dept: SPORTS MEDICINE | Age: 48
End: 2023-05-19

## 2023-05-19 RX ORDER — ICOSAPENT ETHYL 1000 MG/1
CAPSULE ORAL
Qty: 360 CAPSULE | Refills: 1 | Status: SHIPPED | OUTPATIENT
Start: 2023-05-19 | End: 2023-11-16

## 2023-05-22 ENCOUNTER — APPOINTMENT (OUTPATIENT)
Dept: SPORTS MEDICINE | Age: 48
End: 2023-05-22

## 2023-05-23 ENCOUNTER — APPOINTMENT (OUTPATIENT)
Dept: CARDIOLOGY | Age: 48
End: 2023-05-23
Attending: INTERNAL MEDICINE

## 2023-05-23 ENCOUNTER — V-VISIT (OUTPATIENT)
Dept: NUTRITION | Age: 48
End: 2023-05-23

## 2023-05-23 DIAGNOSIS — R63.5 WEIGHT GAIN: ICD-10-CM

## 2023-05-23 PROCEDURE — 99211 OFF/OP EST MAY X REQ PHY/QHP: CPT | Performed by: DIETITIAN, REGISTERED

## 2023-05-24 ENCOUNTER — BEHAVIORAL HEALTH (OUTPATIENT)
Dept: BEHAVIORAL HEALTH | Age: 48
End: 2023-05-24

## 2023-05-24 ENCOUNTER — APPOINTMENT (OUTPATIENT)
Dept: SPORTS MEDICINE | Age: 48
End: 2023-05-24

## 2023-05-24 DIAGNOSIS — F33.41 RECURRENT MAJOR DEPRESSIVE DISORDER, IN PARTIAL REMISSION (CMD): Primary | ICD-10-CM

## 2023-05-24 PROCEDURE — 90832 PSYTX W PT 30 MINUTES: CPT | Performed by: PSYCHOLOGIST

## 2023-05-30 ENCOUNTER — BEHAVIORAL HEALTH (OUTPATIENT)
Dept: BEHAVIORAL HEALTH | Age: 48
End: 2023-05-30

## 2023-05-30 ENCOUNTER — TELEPHONE (OUTPATIENT)
Dept: GASTROENTEROLOGY | Age: 48
End: 2023-05-30

## 2023-05-30 DIAGNOSIS — F12.10 CANNABIS ABUSE, DAILY USE: ICD-10-CM

## 2023-05-30 DIAGNOSIS — F33.0 MILD EPISODE OF RECURRENT MAJOR DEPRESSIVE DISORDER (CMD): Primary | ICD-10-CM

## 2023-05-30 PROCEDURE — 99214 OFFICE O/P EST MOD 30 MIN: CPT | Performed by: PHYSICIAN ASSISTANT

## 2023-05-30 RX ORDER — OLANZAPINE 5 MG/1
5 TABLET ORAL NIGHTLY
Qty: 90 TABLET | Refills: 0 | Status: SHIPPED | OUTPATIENT
Start: 2023-05-30

## 2023-05-31 ENCOUNTER — OFFICE VISIT (OUTPATIENT)
Dept: SPORTS MEDICINE | Age: 48
End: 2023-05-31

## 2023-05-31 VITALS
BODY MASS INDEX: 29.1 KG/M2 | DIASTOLIC BLOOD PRESSURE: 62 MMHG | SYSTOLIC BLOOD PRESSURE: 100 MMHG | HEIGHT: 68 IN | HEART RATE: 80 BPM | WEIGHT: 192 LBS

## 2023-05-31 DIAGNOSIS — M25.562 CHRONIC PAIN OF LEFT KNEE: ICD-10-CM

## 2023-05-31 DIAGNOSIS — M17.12 PATELLOFEMORAL ARTHRITIS OF LEFT KNEE: ICD-10-CM

## 2023-05-31 DIAGNOSIS — G89.29 CHRONIC PAIN OF LEFT KNEE: ICD-10-CM

## 2023-05-31 DIAGNOSIS — M25.362 KNEE INSTABILITY, LEFT: Primary | ICD-10-CM

## 2023-05-31 PROCEDURE — 99214 OFFICE O/P EST MOD 30 MIN: CPT | Performed by: INTERNAL MEDICINE

## 2023-05-31 PROCEDURE — 3078F DIAST BP <80 MM HG: CPT | Performed by: INTERNAL MEDICINE

## 2023-05-31 PROCEDURE — 3074F SYST BP LT 130 MM HG: CPT | Performed by: INTERNAL MEDICINE

## 2023-06-06 ENCOUNTER — APPOINTMENT (OUTPATIENT)
Dept: PHYSICAL THERAPY | Age: 48
End: 2023-06-06

## 2023-06-06 RX ORDER — DEXTROSE MONOHYDRATE 50 MG/ML
INJECTION, SOLUTION INTRAVENOUS CONTINUOUS PRN
Status: CANCELLED | OUTPATIENT
Start: 2023-06-06

## 2023-06-06 RX ORDER — SODIUM CHLORIDE, SODIUM LACTATE, POTASSIUM CHLORIDE, CALCIUM CHLORIDE 600; 310; 30; 20 MG/100ML; MG/100ML; MG/100ML; MG/100ML
INJECTION, SOLUTION INTRAVENOUS CONTINUOUS
Status: CANCELLED | OUTPATIENT
Start: 2023-06-06

## 2023-06-06 RX ORDER — SODIUM CHLORIDE 9 MG/ML
INJECTION, SOLUTION INTRAVENOUS CONTINUOUS
Status: CANCELLED | OUTPATIENT
Start: 2023-06-06

## 2023-06-06 RX ORDER — 0.9 % SODIUM CHLORIDE 0.9 %
2 VIAL (ML) INJECTION EVERY 12 HOURS SCHEDULED
Status: CANCELLED | OUTPATIENT
Start: 2023-06-06

## 2023-06-07 ENCOUNTER — ANESTHESIA EVENT (OUTPATIENT)
Dept: GASTROENTEROLOGY | Age: 48
End: 2023-06-07

## 2023-06-07 ENCOUNTER — TELEPHONE (OUTPATIENT)
Dept: GASTROENTEROLOGY | Age: 48
End: 2023-06-07

## 2023-06-07 ENCOUNTER — BEHAVIORAL HEALTH (OUTPATIENT)
Dept: BEHAVIORAL HEALTH | Age: 48
End: 2023-06-07

## 2023-06-07 ENCOUNTER — APPOINTMENT (OUTPATIENT)
Dept: GASTROENTEROLOGY | Age: 48
End: 2023-06-07
Attending: INTERNAL MEDICINE

## 2023-06-07 ENCOUNTER — ANESTHESIA (OUTPATIENT)
Dept: GASTROENTEROLOGY | Age: 48
End: 2023-06-07

## 2023-06-07 DIAGNOSIS — F33.0 MILD EPISODE OF RECURRENT MAJOR DEPRESSIVE DISORDER (CMD): Primary | ICD-10-CM

## 2023-06-07 DIAGNOSIS — R12 HEARTBURN: Primary | ICD-10-CM

## 2023-06-07 DIAGNOSIS — Z12.11 SPECIAL SCREENING FOR MALIGNANT NEOPLASMS, COLON: ICD-10-CM

## 2023-06-07 PROCEDURE — 90837 PSYTX W PT 60 MINUTES: CPT | Performed by: PSYCHOLOGIST

## 2023-06-12 ENCOUNTER — APPOINTMENT (OUTPATIENT)
Dept: PHYSICAL THERAPY | Age: 48
End: 2023-06-12

## 2023-06-12 ENCOUNTER — APPOINTMENT (OUTPATIENT)
Dept: BEHAVIORAL HEALTH | Age: 48
End: 2023-06-12

## 2023-06-13 ENCOUNTER — V-VISIT (OUTPATIENT)
Dept: NUTRITION | Age: 48
End: 2023-06-13

## 2023-06-13 DIAGNOSIS — R63.5 WEIGHT GAIN: ICD-10-CM

## 2023-06-13 PROCEDURE — 99211 OFF/OP EST MAY X REQ PHY/QHP: CPT | Performed by: DIETITIAN, REGISTERED

## 2023-06-16 RX ORDER — CLONIDINE HYDROCHLORIDE 0.1 MG/1
0.05 TABLET ORAL 2 TIMES DAILY
Qty: 30 TABLET | Refills: 0 | Status: SHIPPED | OUTPATIENT
Start: 2023-06-16

## 2023-06-16 RX ORDER — IBUPROFEN 800 MG/1
800 TABLET ORAL EVERY 8 HOURS PRN
Qty: 30 TABLET | Refills: 3 | Status: SHIPPED | OUTPATIENT
Start: 2023-06-16

## 2023-06-16 RX ORDER — OLANZAPINE 5 MG/1
5 TABLET ORAL NIGHTLY
Qty: 90 TABLET | Refills: 0 | Status: CANCELLED | OUTPATIENT
Start: 2023-06-16

## 2023-06-19 ENCOUNTER — TELEPHONE (OUTPATIENT)
Dept: PHYSICAL THERAPY | Age: 48
End: 2023-06-19

## 2023-06-19 ENCOUNTER — APPOINTMENT (OUTPATIENT)
Dept: PHYSICAL THERAPY | Age: 48
End: 2023-06-19

## 2023-06-19 ENCOUNTER — APPOINTMENT (OUTPATIENT)
Dept: MRI IMAGING | Age: 48
End: 2023-06-19
Attending: INTERNAL MEDICINE

## 2023-06-20 RX ORDER — CLONIDINE HYDROCHLORIDE 0.1 MG/1
0.05 TABLET ORAL 2 TIMES DAILY
Status: CANCELLED | OUTPATIENT
Start: 2023-06-20

## 2023-06-21 ENCOUNTER — TELEPHONE (OUTPATIENT)
Dept: BEHAVIORAL HEALTH | Age: 48
End: 2023-06-21

## 2023-06-21 RX ORDER — ATORVASTATIN CALCIUM 10 MG/1
10 TABLET, FILM COATED ORAL DAILY
Qty: 90 TABLET | Refills: 1 | Status: SHIPPED | OUTPATIENT
Start: 2023-06-21 | End: 2023-06-27 | Stop reason: ALTCHOICE

## 2023-06-22 ENCOUNTER — BEHAVIORAL HEALTH (OUTPATIENT)
Dept: BEHAVIORAL HEALTH | Age: 48
End: 2023-06-22

## 2023-06-22 DIAGNOSIS — F33.0 MILD EPISODE OF RECURRENT MAJOR DEPRESSIVE DISORDER (CMD): Primary | ICD-10-CM

## 2023-06-22 PROCEDURE — 90837 PSYTX W PT 60 MINUTES: CPT | Performed by: PSYCHOLOGIST

## 2023-06-24 ENCOUNTER — APPOINTMENT (OUTPATIENT)
Dept: MRI IMAGING | Age: 48
End: 2023-06-24
Attending: INTERNAL MEDICINE

## 2023-06-27 ENCOUNTER — OFFICE VISIT (OUTPATIENT)
Dept: INTERNAL MEDICINE | Age: 48
End: 2023-06-27

## 2023-06-27 ENCOUNTER — LAB SERVICES (OUTPATIENT)
Dept: LAB | Age: 48
End: 2023-06-27

## 2023-06-27 VITALS
HEIGHT: 68 IN | HEART RATE: 85 BPM | WEIGHT: 192 LBS | BODY MASS INDEX: 29.1 KG/M2 | DIASTOLIC BLOOD PRESSURE: 72 MMHG | OXYGEN SATURATION: 95 % | TEMPERATURE: 96.8 F | SYSTOLIC BLOOD PRESSURE: 110 MMHG

## 2023-06-27 DIAGNOSIS — E03.9 HYPOTHYROIDISM, UNSPECIFIED TYPE: ICD-10-CM

## 2023-06-27 DIAGNOSIS — I10 ESSENTIAL HYPERTENSION: ICD-10-CM

## 2023-06-27 DIAGNOSIS — R74.8 ABNORMAL LIVER ENZYMES: Primary | ICD-10-CM

## 2023-06-27 DIAGNOSIS — K21.9 GASTROESOPHAGEAL REFLUX DISEASE WITHOUT ESOPHAGITIS: ICD-10-CM

## 2023-06-27 DIAGNOSIS — Z13.29 THYROID DISORDER SCREENING: ICD-10-CM

## 2023-06-27 DIAGNOSIS — E78.00 PURE HYPERCHOLESTEROLEMIA: ICD-10-CM

## 2023-06-27 DIAGNOSIS — Z12.11 SCREEN FOR COLON CANCER: ICD-10-CM

## 2023-06-27 DIAGNOSIS — I10 ESSENTIAL HYPERTENSION: Primary | ICD-10-CM

## 2023-06-27 LAB
ALBUMIN SERPL-MCNC: 4 G/DL (ref 3.6–5.1)
ALBUMIN/GLOB SERPL: 1.1 {RATIO} (ref 1–2.4)
ALP SERPL-CCNC: 217 UNITS/L (ref 45–117)
ALT SERPL-CCNC: 110 UNITS/L
ANION GAP SERPL CALC-SCNC: 16 MMOL/L (ref 7–19)
APPEARANCE UR: CLEAR
AST SERPL-CCNC: 54 UNITS/L
BASOPHILS # BLD: 0.1 K/MCL (ref 0–0.3)
BASOPHILS NFR BLD: 1 %
BILIRUB SERPL-MCNC: 0.5 MG/DL (ref 0.2–1)
BILIRUB UR QL STRIP: NEGATIVE
BUN SERPL-MCNC: 14 MG/DL (ref 6–20)
BUN/CREAT SERPL: 11 (ref 7–25)
CALCIUM SERPL-MCNC: 9.6 MG/DL (ref 8.4–10.2)
CHLORIDE SERPL-SCNC: 100 MMOL/L (ref 97–110)
CO2 SERPL-SCNC: 26 MMOL/L (ref 21–32)
COLOR UR: YELLOW
CREAT SERPL-MCNC: 1.26 MG/DL (ref 0.67–1.17)
DEPRECATED RDW RBC: 46.1 FL (ref 39–50)
EOSINOPHIL # BLD: 0.4 K/MCL (ref 0–0.5)
EOSINOPHIL NFR BLD: 5 %
ERYTHROCYTE [DISTWIDTH] IN BLOOD: 13.5 % (ref 11–15)
FASTING DURATION TIME PATIENT: ABNORMAL H
GFR SERPLBLD BASED ON 1.73 SQ M-ARVRAT: 70 ML/MIN
GLOBULIN SER-MCNC: 3.6 G/DL (ref 2–4)
GLUCOSE SERPL-MCNC: 102 MG/DL (ref 70–99)
GLUCOSE UR STRIP-MCNC: NEGATIVE MG/DL
HCT VFR BLD CALC: 44.9 % (ref 39–51)
HGB BLD-MCNC: 15.5 G/DL (ref 13–17)
HGB UR QL STRIP: NEGATIVE
IMM GRANULOCYTES # BLD AUTO: 0.1 K/MCL (ref 0–0.2)
IMM GRANULOCYTES # BLD: 1 %
KETONES UR STRIP-MCNC: NEGATIVE MG/DL
LEUKOCYTE ESTERASE UR QL STRIP: NEGATIVE
LYMPHOCYTES # BLD: 3.9 K/MCL (ref 1–4.8)
LYMPHOCYTES NFR BLD: 42 %
MCH RBC QN AUTO: 32 PG (ref 26–34)
MCHC RBC AUTO-ENTMCNC: 34.5 G/DL (ref 32–36.5)
MCV RBC AUTO: 92.6 FL (ref 78–100)
MONOCYTES # BLD: 0.6 K/MCL (ref 0.3–0.9)
MONOCYTES NFR BLD: 6 %
NEUTROPHILS # BLD: 4.2 K/MCL (ref 1.8–7.7)
NEUTROPHILS NFR BLD: 45 %
NITRITE UR QL STRIP: NEGATIVE
NRBC BLD MANUAL-RTO: 0 /100 WBC
PH UR STRIP: 6 [PH] (ref 5–7)
PLATELET # BLD AUTO: 178 K/MCL (ref 140–450)
POTASSIUM SERPL-SCNC: 4.8 MMOL/L (ref 3.4–5.1)
PROT SERPL-MCNC: 7.6 G/DL (ref 6.4–8.2)
PROT UR STRIP-MCNC: NEGATIVE MG/DL
RBC # BLD: 4.85 MIL/MCL (ref 4.5–5.9)
SODIUM SERPL-SCNC: 137 MMOL/L (ref 135–145)
SP GR UR STRIP: 1.01 (ref 1–1.03)
T4 FREE SERPL-MCNC: 0.9 NG/DL (ref 0.8–1.5)
TSH SERPL-ACNC: 2.55 MCUNITS/ML (ref 0.35–5)
UROBILINOGEN UR STRIP-MCNC: 0.2 MG/DL
WBC # BLD: 9.2 K/MCL (ref 4.2–11)

## 2023-06-27 PROCEDURE — 84439 ASSAY OF FREE THYROXINE: CPT | Performed by: INTERNAL MEDICINE

## 2023-06-27 PROCEDURE — 80050 GENERAL HEALTH PANEL: CPT | Performed by: INTERNAL MEDICINE

## 2023-06-27 PROCEDURE — 3074F SYST BP LT 130 MM HG: CPT | Performed by: INTERNAL MEDICINE

## 2023-06-27 PROCEDURE — 81003 URINALYSIS AUTO W/O SCOPE: CPT | Performed by: INTERNAL MEDICINE

## 2023-06-27 PROCEDURE — 99214 OFFICE O/P EST MOD 30 MIN: CPT | Performed by: INTERNAL MEDICINE

## 2023-06-27 PROCEDURE — 3078F DIAST BP <80 MM HG: CPT | Performed by: INTERNAL MEDICINE

## 2023-06-27 PROCEDURE — 36415 COLL VENOUS BLD VENIPUNCTURE: CPT | Performed by: INTERNAL MEDICINE

## 2023-06-27 RX ORDER — ATORVASTATIN CALCIUM 20 MG/1
20 TABLET, FILM COATED ORAL DAILY
Qty: 90 TABLET | Refills: 1 | Status: SHIPPED | OUTPATIENT
Start: 2023-06-27

## 2023-06-27 RX ORDER — LEVOTHYROXINE SODIUM 0.05 MG/1
50 TABLET ORAL DAILY
Qty: 90 TABLET | Refills: 1 | Status: SHIPPED | OUTPATIENT
Start: 2023-06-27 | End: 2023-09-26 | Stop reason: SDUPTHER

## 2023-06-27 RX ORDER — LEVOTHYROXINE SODIUM 0.05 MG/1
50 TABLET ORAL DAILY
Qty: 90 TABLET | Refills: 1 | Status: SHIPPED | OUTPATIENT
Start: 2023-06-27 | End: 2023-06-27 | Stop reason: SDUPTHER

## 2023-06-27 RX ORDER — PANTOPRAZOLE SODIUM 40 MG/1
40 TABLET, DELAYED RELEASE ORAL DAILY
Qty: 90 TABLET | Refills: 1 | Status: SHIPPED | OUTPATIENT
Start: 2023-06-27 | End: 2023-09-08 | Stop reason: ALTCHOICE

## 2023-06-27 ASSESSMENT — PATIENT HEALTH QUESTIONNAIRE - PHQ9
6. FEELING BAD ABOUT YOURSELF - OR THAT YOU ARE A FAILURE OR HAVE LET YOURSELF OR YOUR FAMILY DOWN: NEARLY EVERY DAY
10. IF YOU CHECKED OFF ANY PROBLEMS, HOW DIFFICULT HAVE THESE PROBLEMS MADE IT FOR YOU TO DO YOUR WORK, TAKE CARE OF THINGS AT HOME, OR GET ALONG WITH OTHER PEOPLE: SOMEWHAT DIFFICULT
CLINICAL INTERPRETATION OF PHQ9 SCORE: MODERATELY SEVERE DEPRESSION
CLINICAL INTERPRETATION OF PHQ2 SCORE: FURTHER SCREENING NEEDED
SUM OF ALL RESPONSES TO PHQ9 QUESTIONS 1 AND 2: 6
5. POOR APPETITE, WEIGHT LOSS, OR OVEREATING: NEARLY EVERY DAY
3. TROUBLE FALLING OR STAYING ASLEEP OR SLEEPING TOO MUCH: NOT AT ALL
9. THOUGHTS THAT YOU WOULD BE BETTER OFF DEAD, OR OF HURTING YOURSELF: NOT AT ALL
4. FEELING TIRED OR HAVING LITTLE ENERGY: SEVERAL DAYS
1. LITTLE INTEREST OR PLEASURE IN DOING THINGS: NEARLY EVERY DAY
SUM OF ALL RESPONSES TO PHQ9 QUESTIONS 1 AND 2: 6
8. MOVING OR SPEAKING SO SLOWLY THAT OTHER PEOPLE COULD HAVE NOTICED. OR THE OPPOSITE, BEING SO FIGETY OR RESTLESS THAT YOU HAVE BEEN MOVING AROUND A LOT MORE THAN USUAL: NEARLY EVERY DAY
2. FEELING DOWN, DEPRESSED OR HOPELESS: NEARLY EVERY DAY
SUM OF ALL RESPONSES TO PHQ QUESTIONS 1-9: 19
7. TROUBLE CONCENTRATING ON THINGS, SUCH AS READING THE NEWSPAPER OR WATCHING TELEVISION: NEARLY EVERY DAY

## 2023-06-27 ASSESSMENT — PAIN SCALES - GENERAL: PAINLEVEL: 0

## 2023-06-28 ENCOUNTER — TELEPHONE (OUTPATIENT)
Dept: INTERNAL MEDICINE | Age: 48
End: 2023-06-28

## 2023-07-03 ENCOUNTER — APPOINTMENT (OUTPATIENT)
Dept: MRI IMAGING | Age: 48
End: 2023-07-03
Attending: INTERNAL MEDICINE

## 2023-07-03 RX ORDER — SIMETHICONE 125 MG
TABLET,CHEWABLE ORAL
Qty: 2 TABLET | Refills: 0 | Status: SHIPPED | OUTPATIENT
Start: 2023-07-03 | End: 2023-08-17

## 2023-07-03 RX ORDER — BISACODYL 5 MG/1
TABLET, DELAYED RELEASE ORAL
Qty: 2 TABLET | Refills: 0 | Status: SHIPPED | OUTPATIENT
Start: 2023-07-03 | End: 2023-08-17

## 2023-07-05 ENCOUNTER — BEHAVIORAL HEALTH (OUTPATIENT)
Dept: BEHAVIORAL HEALTH | Age: 48
End: 2023-07-05

## 2023-07-05 DIAGNOSIS — F33.0 MILD EPISODE OF RECURRENT MAJOR DEPRESSIVE DISORDER (CMD): Primary | ICD-10-CM

## 2023-07-05 PROCEDURE — 90837 PSYTX W PT 60 MINUTES: CPT | Performed by: PSYCHOLOGIST

## 2023-07-08 ENCOUNTER — APPOINTMENT (OUTPATIENT)
Dept: ULTRASOUND IMAGING | Age: 48
End: 2023-07-08
Attending: INTERNAL MEDICINE

## 2023-07-12 ENCOUNTER — APPOINTMENT (OUTPATIENT)
Dept: ULTRASOUND IMAGING | Age: 48
End: 2023-07-12
Attending: INTERNAL MEDICINE

## 2023-07-14 ENCOUNTER — APPOINTMENT (OUTPATIENT)
Dept: MRI IMAGING | Age: 48
End: 2023-07-14
Attending: INTERNAL MEDICINE

## 2023-07-18 ENCOUNTER — BEHAVIORAL HEALTH (OUTPATIENT)
Dept: BEHAVIORAL HEALTH | Age: 48
End: 2023-07-18

## 2023-07-18 DIAGNOSIS — F33.0 MILD EPISODE OF RECURRENT MAJOR DEPRESSIVE DISORDER (CMD): Primary | ICD-10-CM

## 2023-07-18 PROCEDURE — 90837 PSYTX W PT 60 MINUTES: CPT | Performed by: PSYCHOLOGIST

## 2023-07-19 ENCOUNTER — V-VISIT (OUTPATIENT)
Dept: NUTRITION | Age: 48
End: 2023-07-19

## 2023-07-19 ENCOUNTER — APPOINTMENT (OUTPATIENT)
Dept: ULTRASOUND IMAGING | Age: 48
End: 2023-07-19
Attending: INTERNAL MEDICINE

## 2023-07-19 ENCOUNTER — APPOINTMENT (OUTPATIENT)
Dept: PAIN MANAGEMENT | Age: 48
End: 2023-07-19

## 2023-07-19 DIAGNOSIS — R63.4 WEIGHT LOSS: ICD-10-CM

## 2023-07-19 PROCEDURE — 99211 OFF/OP EST MAY X REQ PHY/QHP: CPT | Performed by: DIETITIAN, REGISTERED

## 2023-07-21 ENCOUNTER — E-ADVICE (OUTPATIENT)
Dept: NUTRITION | Age: 48
End: 2023-07-21

## 2023-07-24 ENCOUNTER — IMAGING SERVICES (OUTPATIENT)
Dept: ULTRASOUND IMAGING | Age: 48
End: 2023-07-24
Attending: INTERNAL MEDICINE

## 2023-07-24 DIAGNOSIS — R74.8 ABNORMAL LIVER ENZYMES: ICD-10-CM

## 2023-07-24 PROCEDURE — 76705 ECHO EXAM OF ABDOMEN: CPT | Performed by: RADIOLOGY

## 2023-07-25 ENCOUNTER — APPOINTMENT (OUTPATIENT)
Dept: MRI IMAGING | Age: 48
End: 2023-07-25
Attending: INTERNAL MEDICINE

## 2023-08-01 ENCOUNTER — IMAGING SERVICES (OUTPATIENT)
Dept: MRI IMAGING | Age: 48
End: 2023-08-01
Attending: INTERNAL MEDICINE

## 2023-08-01 DIAGNOSIS — M17.12 PATELLOFEMORAL ARTHRITIS OF LEFT KNEE: ICD-10-CM

## 2023-08-01 DIAGNOSIS — G89.29 CHRONIC PAIN OF LEFT KNEE: ICD-10-CM

## 2023-08-01 DIAGNOSIS — M25.562 CHRONIC PAIN OF LEFT KNEE: ICD-10-CM

## 2023-08-01 DIAGNOSIS — M25.362 KNEE INSTABILITY, LEFT: ICD-10-CM

## 2023-08-01 PROCEDURE — 73721 MRI JNT OF LWR EXTRE W/O DYE: CPT | Performed by: RADIOLOGY

## 2023-08-04 ENCOUNTER — TELEPHONE (OUTPATIENT)
Dept: SPORTS MEDICINE | Age: 48
End: 2023-08-04

## 2023-08-04 DIAGNOSIS — M25.362 KNEE INSTABILITY, LEFT: Primary | ICD-10-CM

## 2023-08-09 ENCOUNTER — TELEPHONE (OUTPATIENT)
Dept: BEHAVIORAL HEALTH | Age: 48
End: 2023-08-09

## 2023-08-10 ENCOUNTER — TELEPHONE (OUTPATIENT)
Dept: INTERNAL MEDICINE | Age: 48
End: 2023-08-10

## 2023-08-14 ENCOUNTER — OFFICE VISIT (OUTPATIENT)
Dept: ORTHOPEDICS | Age: 48
End: 2023-08-14

## 2023-08-14 VITALS — WEIGHT: 198 LBS | BODY MASS INDEX: 30.01 KG/M2 | HEIGHT: 68 IN

## 2023-08-14 DIAGNOSIS — S83.512A RUPTURE OF ANTERIOR CRUCIATE LIGAMENT OF LEFT KNEE, INITIAL ENCOUNTER: Primary | ICD-10-CM

## 2023-08-14 PROCEDURE — 99204 OFFICE O/P NEW MOD 45 MIN: CPT | Performed by: ORTHOPAEDIC SURGERY

## 2023-08-17 ENCOUNTER — TELEPHONE (OUTPATIENT)
Dept: BEHAVIORAL HEALTH | Age: 48
End: 2023-08-17

## 2023-08-29 ENCOUNTER — TELEPHONE (OUTPATIENT)
Dept: INTERNAL MEDICINE | Age: 48
End: 2023-08-29

## 2023-08-29 DIAGNOSIS — I21.4 NON-ST ELEVATION (NSTEMI) MYOCARDIAL INFARCTION (CMD): Primary | ICD-10-CM

## 2023-08-29 RX ORDER — FAMOTIDINE 20 MG/1
20 TABLET, FILM COATED ORAL 2 TIMES DAILY
Qty: 180 TABLET | Refills: 2 | Status: CANCELLED | OUTPATIENT
Start: 2023-08-29

## 2023-08-29 RX ORDER — FAMOTIDINE 20 MG/1
20 TABLET, FILM COATED ORAL 2 TIMES DAILY
Qty: 60 TABLET | Refills: 3 | Status: SHIPPED | OUTPATIENT
Start: 2023-08-29

## 2023-08-31 ENCOUNTER — TELEPHONE (OUTPATIENT)
Dept: INTERNAL MEDICINE | Age: 48
End: 2023-08-31

## 2023-09-08 DIAGNOSIS — G89.29 CHRONIC BILATERAL LOW BACK PAIN WITHOUT SCIATICA: ICD-10-CM

## 2023-09-08 DIAGNOSIS — M54.50 CHRONIC BILATERAL LOW BACK PAIN WITHOUT SCIATICA: ICD-10-CM

## 2023-09-08 DIAGNOSIS — M79.10 MYALGIA: ICD-10-CM

## 2023-09-08 DIAGNOSIS — G62.0 PERIPHERAL NEUROPATHY DUE TO CHEMOTHERAPY (CMD): ICD-10-CM

## 2023-09-08 DIAGNOSIS — T45.1X5A PERIPHERAL NEUROPATHY DUE TO CHEMOTHERAPY (CMD): ICD-10-CM

## 2023-09-08 DIAGNOSIS — M79.7 FIBROMYALGIA: ICD-10-CM

## 2023-09-08 RX ORDER — PREGABALIN 75 MG/1
75 CAPSULE ORAL 2 TIMES DAILY
Qty: 60 CAPSULE | Refills: 1 | Status: SHIPPED | OUTPATIENT
Start: 2023-09-08

## 2023-09-13 ENCOUNTER — TELEPHONE (OUTPATIENT)
Dept: BEHAVIORAL HEALTH | Age: 48
End: 2023-09-13

## 2023-09-21 ENCOUNTER — TELEPHONE (OUTPATIENT)
Dept: BEHAVIORAL HEALTH | Age: 48
End: 2023-09-21

## 2023-09-27 ENCOUNTER — APPOINTMENT (OUTPATIENT)
Dept: BEHAVIORAL HEALTH | Age: 48
End: 2023-09-27

## 2023-09-27 ENCOUNTER — E-ADVICE (OUTPATIENT)
Dept: BEHAVIORAL HEALTH | Age: 48
End: 2023-09-27

## 2023-09-27 RX ORDER — LEVOTHYROXINE SODIUM 0.05 MG/1
50 TABLET ORAL DAILY
Qty: 90 TABLET | Refills: 0 | Status: SHIPPED | OUTPATIENT
Start: 2023-09-27

## 2023-10-05 ENCOUNTER — APPOINTMENT (OUTPATIENT)
Dept: INTERNAL MEDICINE | Age: 48
End: 2023-10-05

## 2023-10-17 RX ORDER — PREGABALIN 75 MG/1
75 CAPSULE ORAL 2 TIMES DAILY
Qty: 60 CAPSULE | Refills: 1 | Status: CANCELLED | OUTPATIENT
Start: 2023-11-09

## 2023-10-18 ENCOUNTER — TELEPHONE (OUTPATIENT)
Dept: PAIN MANAGEMENT | Age: 48
End: 2023-10-18

## 2023-10-18 ENCOUNTER — APPOINTMENT (OUTPATIENT)
Dept: PAIN MANAGEMENT | Age: 48
End: 2023-10-18

## 2023-10-18 ENCOUNTER — OFFICE VISIT (OUTPATIENT)
Dept: PAIN MANAGEMENT | Age: 48
End: 2023-10-18

## 2023-10-18 DIAGNOSIS — M79.7 FIBROMYALGIA: Primary | ICD-10-CM

## 2023-10-19 RX ORDER — OLANZAPINE 5 MG/1
5 TABLET ORAL NIGHTLY
Qty: 30 TABLET | OUTPATIENT
Start: 2023-10-19

## 2023-10-23 ENCOUNTER — APPOINTMENT (OUTPATIENT)
Dept: INTERNAL MEDICINE | Age: 48
End: 2023-10-23

## 2023-10-26 ENCOUNTER — E-CONSULT (OUTPATIENT)
Dept: NEUROLOGY | Age: 48
End: 2023-10-26

## 2023-10-26 ENCOUNTER — APPOINTMENT (OUTPATIENT)
Dept: BEHAVIORAL HEALTH | Age: 48
End: 2023-10-26

## 2023-10-26 ENCOUNTER — OFFICE VISIT (OUTPATIENT)
Dept: INTERNAL MEDICINE | Age: 48
End: 2023-10-26

## 2023-10-26 ENCOUNTER — LAB SERVICES (OUTPATIENT)
Dept: LAB | Age: 48
End: 2023-10-26

## 2023-10-26 VITALS
OXYGEN SATURATION: 97 % | HEART RATE: 92 BPM | DIASTOLIC BLOOD PRESSURE: 82 MMHG | TEMPERATURE: 97.3 F | WEIGHT: 207 LBS | BODY MASS INDEX: 31.37 KG/M2 | HEIGHT: 68 IN | SYSTOLIC BLOOD PRESSURE: 110 MMHG

## 2023-10-26 DIAGNOSIS — R41.3 MEMORY IMPAIRMENT: ICD-10-CM

## 2023-10-26 DIAGNOSIS — I10 ESSENTIAL HYPERTENSION: ICD-10-CM

## 2023-10-26 DIAGNOSIS — R73.01 IMPAIRED FASTING GLUCOSE: ICD-10-CM

## 2023-10-26 DIAGNOSIS — M79.7 FIBROMYALGIA: ICD-10-CM

## 2023-10-26 DIAGNOSIS — E03.9 HYPOTHYROIDISM, UNSPECIFIED TYPE: ICD-10-CM

## 2023-10-26 DIAGNOSIS — E78.00 PURE HYPERCHOLESTEROLEMIA: Primary | ICD-10-CM

## 2023-10-26 DIAGNOSIS — F43.10 PTSD (POST-TRAUMATIC STRESS DISORDER): ICD-10-CM

## 2023-10-26 DIAGNOSIS — E78.00 PURE HYPERCHOLESTEROLEMIA: ICD-10-CM

## 2023-10-26 DIAGNOSIS — G89.29 CHRONIC PAIN OF LEFT KNEE: ICD-10-CM

## 2023-10-26 DIAGNOSIS — Z23 NEED FOR INFLUENZA VACCINATION: ICD-10-CM

## 2023-10-26 DIAGNOSIS — F90.0 ATTENTION DEFICIT HYPERACTIVITY DISORDER (ADHD), PREDOMINANTLY INATTENTIVE TYPE: ICD-10-CM

## 2023-10-26 DIAGNOSIS — F33.0 MILD RECURRENT MAJOR DEPRESSION (CMD): ICD-10-CM

## 2023-10-26 DIAGNOSIS — M25.562 CHRONIC PAIN OF LEFT KNEE: ICD-10-CM

## 2023-10-26 PROBLEM — I25.10 ATHEROSCLEROSIS OF NATIVE CORONARY ARTERY OF NATIVE HEART WITHOUT ANGINA PECTORIS: Status: ACTIVE | Noted: 2023-08-28

## 2023-10-26 PROBLEM — E78.2 MIXED HYPERLIPIDEMIA: Status: ACTIVE | Noted: 2023-08-28

## 2023-10-26 LAB
ALBUMIN SERPL-MCNC: 3.8 G/DL (ref 3.6–5.1)
ALBUMIN/GLOB SERPL: 1.2 {RATIO} (ref 1–2.4)
ALP SERPL-CCNC: 202 UNITS/L (ref 45–117)
ALT SERPL-CCNC: 70 UNITS/L
ANION GAP SERPL CALC-SCNC: 15 MMOL/L (ref 7–19)
AST SERPL-CCNC: 35 UNITS/L
BASOPHILS # BLD: 0.1 K/MCL (ref 0–0.3)
BASOPHILS NFR BLD: 1 %
BILIRUB SERPL-MCNC: 0.4 MG/DL (ref 0.2–1)
BUN SERPL-MCNC: 16 MG/DL (ref 6–20)
BUN/CREAT SERPL: 12 (ref 7–25)
CALCIUM SERPL-MCNC: 9.2 MG/DL (ref 8.4–10.2)
CHLORIDE SERPL-SCNC: 103 MMOL/L (ref 97–110)
CHOLEST SERPL-MCNC: 203 MG/DL
CHOLEST/HDLC SERPL: 6 {RATIO}
CO2 SERPL-SCNC: 27 MMOL/L (ref 21–32)
CREAT SERPL-MCNC: 1.29 MG/DL (ref 0.67–1.17)
DEPRECATED RDW RBC: 43.8 FL (ref 39–50)
EGFRCR SERPLBLD CKD-EPI 2021: 68 ML/MIN/{1.73_M2}
EOSINOPHIL # BLD: 0.2 K/MCL (ref 0–0.5)
EOSINOPHIL NFR BLD: 3 %
ERYTHROCYTE [DISTWIDTH] IN BLOOD: 12.7 % (ref 11–15)
FASTING DURATION TIME PATIENT: ABNORMAL H
FOLATE SERPL-MCNC: 10.4 NG/ML
GLOBULIN SER-MCNC: 3.3 G/DL (ref 2–4)
GLUCOSE SERPL-MCNC: 85 MG/DL (ref 70–99)
HBA1C MFR BLD: 6 % (ref 4.5–5.6)
HCT VFR BLD CALC: 44.4 % (ref 39–51)
HDLC SERPL-MCNC: 34 MG/DL
HGB BLD-MCNC: 14.6 G/DL (ref 13–17)
IMM GRANULOCYTES # BLD AUTO: 0 K/MCL (ref 0–0.2)
IMM GRANULOCYTES # BLD: 0 %
LDLC SERPL CALC-MCNC: ABNORMAL MG/DL
LDLC SERPL DIRECT ASSAY-MCNC: 82 MG/DL
LYMPHOCYTES # BLD: 2.4 K/MCL (ref 1–4.8)
LYMPHOCYTES NFR BLD: 35 %
MCH RBC QN AUTO: 30.9 PG (ref 26–34)
MCHC RBC AUTO-ENTMCNC: 32.9 G/DL (ref 32–36.5)
MCV RBC AUTO: 93.9 FL (ref 78–100)
MONOCYTES # BLD: 0.4 K/MCL (ref 0.3–0.9)
MONOCYTES NFR BLD: 6 %
NEUTROPHILS # BLD: 3.7 K/MCL (ref 1.8–7.7)
NEUTROPHILS NFR BLD: 55 %
NONHDLC SERPL-MCNC: 169 MG/DL
NRBC BLD MANUAL-RTO: 0 /100 WBC
PLATELET # BLD AUTO: 158 K/MCL (ref 140–450)
POTASSIUM SERPL-SCNC: 4.3 MMOL/L (ref 3.4–5.1)
PROT SERPL-MCNC: 7.1 G/DL (ref 6.4–8.2)
RBC # BLD: 4.73 MIL/MCL (ref 4.5–5.9)
SODIUM SERPL-SCNC: 141 MMOL/L (ref 135–145)
TRIGL SERPL-MCNC: 467 MG/DL
TSH SERPL-ACNC: 4.84 MCUNITS/ML (ref 0.35–5)
VIT B12 SERPL-MCNC: 575 PG/ML (ref 211–911)
WBC # BLD: 6.8 K/MCL (ref 4.2–11)

## 2023-10-26 PROCEDURE — 3074F SYST BP LT 130 MM HG: CPT | Performed by: INTERNAL MEDICINE

## 2023-10-26 PROCEDURE — 80050 GENERAL HEALTH PANEL: CPT | Performed by: INTERNAL MEDICINE

## 2023-10-26 PROCEDURE — 83036 HEMOGLOBIN GLYCOSYLATED A1C: CPT | Performed by: INTERNAL MEDICINE

## 2023-10-26 PROCEDURE — 80061 LIPID PANEL: CPT | Performed by: INTERNAL MEDICINE

## 2023-10-26 PROCEDURE — 36415 COLL VENOUS BLD VENIPUNCTURE: CPT | Performed by: INTERNAL MEDICINE

## 2023-10-26 PROCEDURE — 99214 OFFICE O/P EST MOD 30 MIN: CPT | Performed by: INTERNAL MEDICINE

## 2023-10-26 PROCEDURE — 3079F DIAST BP 80-89 MM HG: CPT | Performed by: INTERNAL MEDICINE

## 2023-10-26 PROCEDURE — 82746 ASSAY OF FOLIC ACID SERUM: CPT | Performed by: CLINICAL MEDICAL LABORATORY

## 2023-10-26 PROCEDURE — 90686 IIV4 VACC NO PRSV 0.5 ML IM: CPT | Performed by: INTERNAL MEDICINE

## 2023-10-26 PROCEDURE — 83721 ASSAY OF BLOOD LIPOPROTEIN: CPT | Performed by: INTERNAL MEDICINE

## 2023-10-26 PROCEDURE — 82607 VITAMIN B-12: CPT | Performed by: CLINICAL MEDICAL LABORATORY

## 2023-10-26 PROCEDURE — G0008 ADMIN INFLUENZA VIRUS VAC: HCPCS | Performed by: INTERNAL MEDICINE

## 2023-10-26 RX ORDER — ASPIRIN 81 MG/1
81 TABLET, COATED ORAL DAILY
COMMUNITY
Start: 2023-09-08

## 2023-10-26 RX ORDER — PANTOPRAZOLE SODIUM 40 MG/1
1 TABLET, DELAYED RELEASE ORAL DAILY
COMMUNITY
Start: 2023-06-28 | End: 2023-10-26 | Stop reason: SDUPTHER

## 2023-10-26 RX ORDER — BACLOFEN 10 MG/1
1 TABLET ORAL 3 TIMES DAILY
COMMUNITY

## 2023-10-26 ASSESSMENT — PATIENT HEALTH QUESTIONNAIRE - PHQ9
SUM OF ALL RESPONSES TO PHQ9 QUESTIONS 1 AND 2: 1
CLINICAL INTERPRETATION OF PHQ2 SCORE: NO FURTHER SCREENING NEEDED
2. FEELING DOWN, DEPRESSED OR HOPELESS: SEVERAL DAYS
1. LITTLE INTEREST OR PLEASURE IN DOING THINGS: NOT AT ALL
SUM OF ALL RESPONSES TO PHQ9 QUESTIONS 1 AND 2: 1

## 2023-10-26 ASSESSMENT — PAIN SCALES - GENERAL: PAINLEVEL: 0

## 2023-10-27 DIAGNOSIS — R74.8 ELEVATED ALKALINE PHOSPHATASE LEVEL: Primary | ICD-10-CM

## 2023-10-30 ENCOUNTER — TELEPHONE (OUTPATIENT)
Dept: GASTROENTEROLOGY | Age: 48
End: 2023-10-30

## 2023-10-30 ENCOUNTER — E-ADVICE (OUTPATIENT)
Dept: PHYSICAL THERAPY | Age: 48
End: 2023-10-30

## 2023-10-31 ENCOUNTER — APPOINTMENT (OUTPATIENT)
Dept: PHYSICAL THERAPY | Age: 48
End: 2023-10-31
Attending: INTERNAL MEDICINE

## 2023-10-31 ENCOUNTER — TELEPHONE (OUTPATIENT)
Dept: INTERNAL MEDICINE | Age: 48
End: 2023-10-31

## 2023-10-31 ENCOUNTER — TELEPHONE (OUTPATIENT)
Dept: NUTRITION | Age: 48
End: 2023-10-31

## 2023-10-31 DIAGNOSIS — R73.01 IFG (IMPAIRED FASTING GLUCOSE): ICD-10-CM

## 2023-10-31 DIAGNOSIS — E78.00 PURE HYPERCHOLESTEROLEMIA: Primary | ICD-10-CM

## 2023-11-02 ENCOUNTER — E-ADVICE (OUTPATIENT)
Dept: INTERNAL MEDICINE | Age: 48
End: 2023-11-02

## 2023-11-02 ENCOUNTER — APPOINTMENT (OUTPATIENT)
Dept: PAIN MANAGEMENT | Age: 48
End: 2023-11-02

## 2023-11-06 ENCOUNTER — APPOINTMENT (OUTPATIENT)
Dept: PHYSICAL THERAPY | Age: 48
End: 2023-11-06
Attending: INTERNAL MEDICINE

## 2023-11-07 ENCOUNTER — TELEPHONE (OUTPATIENT)
Dept: PHYSICAL THERAPY | Age: 48
End: 2023-11-07

## 2023-11-07 ENCOUNTER — V-VISIT (OUTPATIENT)
Dept: NUTRITION | Age: 48
End: 2023-11-07

## 2023-11-07 DIAGNOSIS — E78.00 PURE HYPERCHOLESTEROLEMIA: ICD-10-CM

## 2023-11-07 DIAGNOSIS — R73.01 IFG (IMPAIRED FASTING GLUCOSE): Primary | ICD-10-CM

## 2023-11-07 PROCEDURE — 99211 OFF/OP EST MAY X REQ PHY/QHP: CPT | Performed by: DIETITIAN, REGISTERED

## 2023-11-08 ENCOUNTER — OFFICE VISIT (OUTPATIENT)
Dept: PHYSICAL THERAPY | Age: 48
End: 2023-11-08
Attending: INTERNAL MEDICINE

## 2023-11-08 DIAGNOSIS — M25.362 KNEE INSTABILITY, LEFT: ICD-10-CM

## 2023-11-08 DIAGNOSIS — R26.2 DIFFICULTY WALKING: ICD-10-CM

## 2023-11-08 DIAGNOSIS — G89.29 CHRONIC PAIN OF LEFT KNEE: Primary | ICD-10-CM

## 2023-11-08 DIAGNOSIS — M25.562 CHRONIC PAIN OF LEFT KNEE: Primary | ICD-10-CM

## 2023-11-08 DIAGNOSIS — R29.898 WEAKNESS OF BOTH HIPS: ICD-10-CM

## 2023-11-08 PROCEDURE — 97163 PT EVAL HIGH COMPLEX 45 MIN: CPT | Performed by: PHYSICAL THERAPIST

## 2023-11-08 PROCEDURE — 97140 MANUAL THERAPY 1/> REGIONS: CPT | Performed by: PHYSICAL THERAPIST

## 2023-11-08 ASSESSMENT — ENCOUNTER SYMPTOMS
ALLEVIATING FACTORS: REST
PAIN SCALE AT LOWEST: 0
PAIN FREQUENCY: INTERMITTENT
ALLEVIATING FACTORS: PRESCRIPTION MEDICATIONS
PAIN SCALE AT HIGHEST: 8
QUALITY: POPPING / CLICKING
PAIN LOCATION: LEFT ANTERIOR KNEE

## 2023-11-09 PROBLEM — G89.29 CHRONIC PAIN OF LEFT KNEE: Status: ACTIVE | Noted: 2023-11-09

## 2023-11-09 PROBLEM — M25.562 CHRONIC PAIN OF LEFT KNEE: Status: ACTIVE | Noted: 2023-11-09

## 2023-11-09 PROBLEM — R26.2 DIFFICULTY WALKING: Status: ACTIVE | Noted: 2023-11-09

## 2023-11-09 ASSESSMENT — MOVEMENT AND STRENGTH ASSESSMENTS
PUTTING ON YOUR SHOES OR SOCKS: EXTREME DIFFICULTY OR UNABLE TO PERFORM ACTIVITY
RUNNING ON UNEVEN GROUND: EXTREME DIFFICULTY OR UNABLE TO PERFORM ACTIVITY
SITTING FOR 1 HOUR: NO DIFFICULTY
WALKING BETWEEN ROOMS: A LITTLE BIT OF DIFFICULTY
TOTAL SCORE: 30
ROLLING OVER IN BED: MODERATE DIFFICULTY
PERFORMING LIGHT ACTIVITES AROUND YOUR HOME: A LITTLE BIT OF DIFFICULTY
STANDING FOR 1 HOUR: EXTREME DIFFICULTY OR UNABLE TO PERFORM ACTIVITY
HOPPING: EXTREME DIFFICULTY OR UNABLE TO PERFORM ACTIVITY
GETTING INTO OR OUT OF A CAR: QUITE A BIT OF DIFFICULTY
GOING UP OR DOWN 10 STAIRS (ABOUT 1 FLIGHT OF STAIRS): EXTREME DIFFICULTY OR UNABLE TO PERFORM ACTIVITY
LIFTING AN OBJECT, LIKE A BAG OF GROCERIES, FROM THE FLOOR: MODERATE DIFFICULTY
PERFORMING HEAVY ACTIVITIES AROUND YOUR HOME: EXTREME DIFFICULTY OR UNABLE TO PERFORM ACTIVITY
MAKING SHARP TURNS WHILE RUNNING FAST: EXTREME DIFFICULTY OR UNABLE TO PERFORM ACTIVITY
WALKING 2 BLOCKS: QUITE A BIT OF DIFFICULTY
YOUR USUAL HOBBIES, RECREATIONAL OR SPORTING ACTIVIITIES: A LITTLE BIT OF DIFFICULTY
RUNNING ON EVEN GROUND: EXTREME DIFFICULTY OR UNABLE TO PERFORM ACTIVITY
ANY OF YOUR USUAL WORK, HOUSEWORK OR SCHOOL ACTIVITIES: A LITTLE BIT OF DIFFICULTY
WALKING A MILE: EXTREME DIFFICULTY OR UNABLE TO PERFORM ACTIVITY
GETTING INTO OR OUT OF THE BATH: MODERATE DIFFICULTY
SQUATTING: EXTREME DIFFICULTY OR UNABLE TO PERFORM ACTIVITY

## 2023-11-14 ENCOUNTER — OFFICE VISIT (OUTPATIENT)
Dept: PHYSICAL THERAPY | Age: 48
End: 2023-11-14
Attending: INTERNAL MEDICINE

## 2023-11-14 DIAGNOSIS — R26.2 DIFFICULTY WALKING: ICD-10-CM

## 2023-11-14 DIAGNOSIS — R29.898 WEAKNESS OF BOTH HIPS: ICD-10-CM

## 2023-11-14 DIAGNOSIS — G89.29 CHRONIC PAIN OF LEFT KNEE: Primary | ICD-10-CM

## 2023-11-14 DIAGNOSIS — M25.362 KNEE INSTABILITY, LEFT: ICD-10-CM

## 2023-11-14 DIAGNOSIS — M25.562 CHRONIC PAIN OF LEFT KNEE: Primary | ICD-10-CM

## 2023-11-14 PROCEDURE — 97140 MANUAL THERAPY 1/> REGIONS: CPT | Performed by: PHYSICAL THERAPIST

## 2023-11-14 PROCEDURE — 97110 THERAPEUTIC EXERCISES: CPT | Performed by: PHYSICAL THERAPIST

## 2023-11-14 PROCEDURE — G0283 ELEC STIM OTHER THAN WOUND: HCPCS | Performed by: PHYSICAL THERAPIST

## 2023-11-16 ENCOUNTER — APPOINTMENT (OUTPATIENT)
Dept: PHYSICAL THERAPY | Age: 48
End: 2023-11-16
Attending: INTERNAL MEDICINE

## 2023-11-16 DIAGNOSIS — M25.362 KNEE INSTABILITY, LEFT: ICD-10-CM

## 2023-11-16 DIAGNOSIS — M25.562 CHRONIC PAIN OF LEFT KNEE: Primary | ICD-10-CM

## 2023-11-16 DIAGNOSIS — R26.2 DIFFICULTY WALKING: ICD-10-CM

## 2023-11-16 DIAGNOSIS — R29.898 WEAKNESS OF BOTH HIPS: ICD-10-CM

## 2023-11-16 DIAGNOSIS — G89.29 CHRONIC PAIN OF LEFT KNEE: Primary | ICD-10-CM

## 2023-11-16 RX ORDER — ICOSAPENT ETHYL 1000 MG/1
CAPSULE ORAL 2 TIMES DAILY
Qty: 360 CAPSULE | Refills: 1 | Status: SHIPPED | OUTPATIENT
Start: 2023-11-16

## 2023-11-20 ENCOUNTER — APPOINTMENT (OUTPATIENT)
Dept: PHYSICAL THERAPY | Age: 48
End: 2023-11-20

## 2023-11-20 DIAGNOSIS — M25.562 CHRONIC PAIN OF LEFT KNEE: Primary | ICD-10-CM

## 2023-11-20 DIAGNOSIS — M25.362 KNEE INSTABILITY, LEFT: ICD-10-CM

## 2023-11-20 DIAGNOSIS — G89.29 CHRONIC PAIN OF LEFT KNEE: Primary | ICD-10-CM

## 2023-11-20 DIAGNOSIS — R26.2 DIFFICULTY WALKING: ICD-10-CM

## 2023-11-20 DIAGNOSIS — R29.898 WEAKNESS OF BOTH HIPS: ICD-10-CM

## 2023-11-20 PROCEDURE — 97112 NEUROMUSCULAR REEDUCATION: CPT | Performed by: PHYSICAL THERAPIST

## 2023-11-20 PROCEDURE — 97110 THERAPEUTIC EXERCISES: CPT | Performed by: PHYSICAL THERAPIST

## 2023-11-20 PROCEDURE — G0283 ELEC STIM OTHER THAN WOUND: HCPCS | Performed by: PHYSICAL THERAPIST

## 2023-11-20 PROCEDURE — 97140 MANUAL THERAPY 1/> REGIONS: CPT | Performed by: PHYSICAL THERAPIST

## 2023-11-21 ENCOUNTER — V-VISIT (OUTPATIENT)
Dept: NUTRITION | Age: 48
End: 2023-11-21

## 2023-11-21 ENCOUNTER — APPOINTMENT (OUTPATIENT)
Dept: NUTRITION | Age: 48
End: 2023-11-21

## 2023-11-21 DIAGNOSIS — E78.2 MIXED HYPERLIPIDEMIA: ICD-10-CM

## 2023-11-21 PROCEDURE — 99211 OFF/OP EST MAY X REQ PHY/QHP: CPT | Performed by: DIETITIAN, REGISTERED

## 2023-11-21 RX ORDER — AMITRIPTYLINE HYDROCHLORIDE 100 MG/1
100 TABLET ORAL NIGHTLY
Qty: 90 TABLET | Refills: 1 | Status: SHIPPED | OUTPATIENT
Start: 2023-11-21

## 2023-11-24 ENCOUNTER — E-ADVICE (OUTPATIENT)
Dept: NUTRITION | Age: 48
End: 2023-11-24

## 2023-11-29 ENCOUNTER — APPOINTMENT (OUTPATIENT)
Dept: GASTROENTEROLOGY | Age: 48
End: 2023-11-29
Attending: INTERNAL MEDICINE

## 2023-11-29 ENCOUNTER — TELEPHONE (OUTPATIENT)
Dept: GASTROENTEROLOGY | Age: 48
End: 2023-11-29

## 2023-11-29 DIAGNOSIS — Z12.11 SPECIAL SCREENING FOR MALIGNANT NEOPLASMS, COLON: ICD-10-CM

## 2023-11-29 DIAGNOSIS — R12 HEARTBURN: ICD-10-CM

## 2023-11-29 DIAGNOSIS — R12 HEARTBURN: Primary | ICD-10-CM

## 2023-11-29 RX ORDER — SODIUM CHLORIDE, SODIUM LACTATE, POTASSIUM CHLORIDE, CALCIUM CHLORIDE 600; 310; 30; 20 MG/100ML; MG/100ML; MG/100ML; MG/100ML
INJECTION, SOLUTION INTRAVENOUS CONTINUOUS
Status: DISCONTINUED | OUTPATIENT
Start: 2023-11-29 | End: 2023-12-01 | Stop reason: HOSPADM

## 2023-11-29 RX ORDER — DEXTROSE MONOHYDRATE 50 MG/ML
INJECTION, SOLUTION INTRAVENOUS CONTINUOUS PRN
Status: DISCONTINUED | OUTPATIENT
Start: 2023-11-29 | End: 2023-12-01 | Stop reason: HOSPADM

## 2023-11-29 RX ORDER — SODIUM CHLORIDE 9 MG/ML
INJECTION, SOLUTION INTRAVENOUS CONTINUOUS
Status: DISCONTINUED | OUTPATIENT
Start: 2023-11-29 | End: 2023-12-01 | Stop reason: HOSPADM

## 2023-11-29 RX ORDER — LIDOCAINE HYDROCHLORIDE 10 MG/ML
5-10 INJECTION, SOLUTION INFILTRATION; PERINEURAL PRN
Status: DISCONTINUED | OUTPATIENT
Start: 2023-11-29 | End: 2023-12-01 | Stop reason: HOSPADM

## 2023-12-04 ENCOUNTER — APPOINTMENT (OUTPATIENT)
Dept: BEHAVIORAL HEALTH | Age: 48
End: 2023-12-04

## 2023-12-04 DIAGNOSIS — F43.29 ADJUSTMENT DISORDER WITH MIXED EMOTIONAL FEATURES: Primary | ICD-10-CM

## 2023-12-04 PROCEDURE — 90791 PSYCH DIAGNOSTIC EVALUATION: CPT | Performed by: PSYCHOLOGIST

## 2023-12-04 RX ORDER — CLOPIDOGREL BISULFATE 75 MG/1
75 TABLET ORAL DAILY
Qty: 90 TABLET | Refills: 1 | Status: SHIPPED | OUTPATIENT
Start: 2023-12-04

## 2023-12-04 RX ORDER — ISOSORBIDE MONONITRATE 30 MG/1
30 TABLET, EXTENDED RELEASE ORAL DAILY
Qty: 90 TABLET | Refills: 1 | Status: SHIPPED | OUTPATIENT
Start: 2023-12-04

## 2023-12-07 ENCOUNTER — APPOINTMENT (OUTPATIENT)
Dept: PHYSICAL THERAPY | Age: 48
End: 2023-12-07

## 2023-12-07 DIAGNOSIS — M25.362 KNEE INSTABILITY, LEFT: ICD-10-CM

## 2023-12-07 DIAGNOSIS — R26.2 DIFFICULTY WALKING: ICD-10-CM

## 2023-12-07 DIAGNOSIS — G89.29 CHRONIC PAIN OF LEFT KNEE: Primary | ICD-10-CM

## 2023-12-07 DIAGNOSIS — R29.898 WEAKNESS OF BOTH HIPS: ICD-10-CM

## 2023-12-07 DIAGNOSIS — M25.562 CHRONIC PAIN OF LEFT KNEE: Primary | ICD-10-CM

## 2023-12-13 ENCOUNTER — APPOINTMENT (OUTPATIENT)
Dept: PHYSICAL THERAPY | Age: 48
End: 2023-12-13

## 2023-12-13 DIAGNOSIS — M25.362 KNEE INSTABILITY, LEFT: ICD-10-CM

## 2023-12-13 DIAGNOSIS — R29.898 WEAKNESS OF BOTH HIPS: ICD-10-CM

## 2023-12-13 DIAGNOSIS — R26.2 DIFFICULTY WALKING: ICD-10-CM

## 2023-12-13 DIAGNOSIS — G89.29 CHRONIC PAIN OF LEFT KNEE: Primary | ICD-10-CM

## 2023-12-13 DIAGNOSIS — M25.562 CHRONIC PAIN OF LEFT KNEE: Primary | ICD-10-CM

## 2023-12-13 PROCEDURE — 97110 THERAPEUTIC EXERCISES: CPT | Performed by: PHYSICAL THERAPIST

## 2023-12-13 PROCEDURE — 97140 MANUAL THERAPY 1/> REGIONS: CPT | Performed by: PHYSICAL THERAPIST

## 2023-12-13 PROCEDURE — G0283 ELEC STIM OTHER THAN WOUND: HCPCS | Performed by: PHYSICAL THERAPIST

## 2023-12-19 ENCOUNTER — TELEPHONE (OUTPATIENT)
Dept: GASTROENTEROLOGY | Age: 48
End: 2023-12-19

## 2023-12-19 ENCOUNTER — APPOINTMENT (OUTPATIENT)
Dept: NUTRITION | Age: 48
End: 2023-12-19

## 2023-12-19 ENCOUNTER — APPOINTMENT (OUTPATIENT)
Dept: PHYSICAL THERAPY | Age: 48
End: 2023-12-19

## 2023-12-19 DIAGNOSIS — R73.01 IFG (IMPAIRED FASTING GLUCOSE): Primary | ICD-10-CM

## 2023-12-19 PROCEDURE — 99211 OFF/OP EST MAY X REQ PHY/QHP: CPT | Performed by: DIETITIAN, REGISTERED

## 2023-12-19 RX ORDER — ATORVASTATIN CALCIUM 20 MG/1
20 TABLET, FILM COATED ORAL DAILY
Qty: 90 TABLET | Refills: 0 | Status: SHIPPED | OUTPATIENT
Start: 2023-12-19

## 2023-12-19 RX ORDER — FAMOTIDINE 20 MG/1
20 TABLET, FILM COATED ORAL 2 TIMES DAILY
Qty: 180 TABLET | Refills: 0 | Status: SHIPPED | OUTPATIENT
Start: 2023-12-19

## 2023-12-19 RX ORDER — FAMOTIDINE 20 MG/1
20 TABLET, FILM COATED ORAL 2 TIMES DAILY
Qty: 60 TABLET | Refills: 0 | Status: SHIPPED | OUTPATIENT
Start: 2023-12-19 | End: 2023-12-19

## 2023-12-21 ENCOUNTER — APPOINTMENT (OUTPATIENT)
Dept: PHYSICAL THERAPY | Age: 48
End: 2023-12-21

## 2023-12-21 DIAGNOSIS — M25.362 KNEE INSTABILITY, LEFT: ICD-10-CM

## 2023-12-21 DIAGNOSIS — M25.562 CHRONIC PAIN OF LEFT KNEE: Primary | ICD-10-CM

## 2023-12-21 DIAGNOSIS — R29.898 WEAKNESS OF BOTH HIPS: ICD-10-CM

## 2023-12-21 DIAGNOSIS — G89.29 CHRONIC PAIN OF LEFT KNEE: Primary | ICD-10-CM

## 2023-12-21 DIAGNOSIS — R26.2 DIFFICULTY WALKING: ICD-10-CM

## 2023-12-21 PROCEDURE — G0283 ELEC STIM OTHER THAN WOUND: HCPCS | Performed by: PHYSICAL THERAPIST

## 2023-12-21 PROCEDURE — 97140 MANUAL THERAPY 1/> REGIONS: CPT | Performed by: PHYSICAL THERAPIST

## 2023-12-21 PROCEDURE — 97110 THERAPEUTIC EXERCISES: CPT | Performed by: PHYSICAL THERAPIST

## 2024-01-03 ENCOUNTER — APPOINTMENT (OUTPATIENT)
Dept: PHYSICAL THERAPY | Age: 49
End: 2024-01-03

## 2024-01-03 DIAGNOSIS — R26.2 DIFFICULTY WALKING: ICD-10-CM

## 2024-01-03 DIAGNOSIS — G89.29 CHRONIC PAIN OF LEFT KNEE: Primary | ICD-10-CM

## 2024-01-03 DIAGNOSIS — M25.562 CHRONIC PAIN OF LEFT KNEE: Primary | ICD-10-CM

## 2024-01-03 DIAGNOSIS — R29.898 WEAKNESS OF BOTH HIPS: ICD-10-CM

## 2024-01-03 DIAGNOSIS — M25.362 KNEE INSTABILITY, LEFT: ICD-10-CM

## 2024-01-03 PROCEDURE — 97112 NEUROMUSCULAR REEDUCATION: CPT | Performed by: PHYSICAL THERAPIST

## 2024-01-03 PROCEDURE — G0283 ELEC STIM OTHER THAN WOUND: HCPCS | Performed by: PHYSICAL THERAPIST

## 2024-01-03 PROCEDURE — 97110 THERAPEUTIC EXERCISES: CPT | Performed by: PHYSICAL THERAPIST

## 2024-01-03 PROCEDURE — 97140 MANUAL THERAPY 1/> REGIONS: CPT | Performed by: PHYSICAL THERAPIST

## 2024-01-04 ENCOUNTER — E-ADVICE (OUTPATIENT)
Dept: GASTROENTEROLOGY | Age: 49
End: 2024-01-04

## 2024-01-04 ENCOUNTER — APPOINTMENT (OUTPATIENT)
Dept: PAIN MANAGEMENT | Age: 49
End: 2024-01-04

## 2024-01-10 ENCOUNTER — APPOINTMENT (OUTPATIENT)
Dept: PHYSICAL THERAPY | Age: 49
End: 2024-01-10

## 2024-01-10 DIAGNOSIS — M25.562 CHRONIC PAIN OF LEFT KNEE: Primary | ICD-10-CM

## 2024-01-10 DIAGNOSIS — R26.2 DIFFICULTY WALKING: ICD-10-CM

## 2024-01-10 DIAGNOSIS — G89.29 CHRONIC PAIN OF LEFT KNEE: Primary | ICD-10-CM

## 2024-01-10 DIAGNOSIS — R29.898 WEAKNESS OF BOTH HIPS: ICD-10-CM

## 2024-01-10 DIAGNOSIS — M25.362 KNEE INSTABILITY, LEFT: ICD-10-CM

## 2024-01-10 PROCEDURE — G0283 ELEC STIM OTHER THAN WOUND: HCPCS | Performed by: PHYSICAL THERAPIST

## 2024-01-10 PROCEDURE — 97110 THERAPEUTIC EXERCISES: CPT | Performed by: PHYSICAL THERAPIST

## 2024-01-10 PROCEDURE — 97140 MANUAL THERAPY 1/> REGIONS: CPT | Performed by: PHYSICAL THERAPIST

## 2024-01-10 PROCEDURE — 97112 NEUROMUSCULAR REEDUCATION: CPT | Performed by: PHYSICAL THERAPIST

## 2024-01-17 ENCOUNTER — APPOINTMENT (OUTPATIENT)
Dept: PHYSICAL THERAPY | Age: 49
End: 2024-01-17

## 2024-01-18 ENCOUNTER — APPOINTMENT (OUTPATIENT)
Dept: PAIN MANAGEMENT | Age: 49
End: 2024-01-18

## 2024-01-18 ENCOUNTER — TELEPHONE (OUTPATIENT)
Dept: PAIN MANAGEMENT | Age: 49
End: 2024-01-18

## 2024-01-18 VITALS — BODY MASS INDEX: 28.89 KG/M2 | WEIGHT: 190 LBS

## 2024-01-18 DIAGNOSIS — G62.0 PERIPHERAL NEUROPATHY DUE TO CHEMOTHERAPY (CMD): ICD-10-CM

## 2024-01-18 DIAGNOSIS — G89.29 CHRONIC PAIN OF LEFT KNEE: ICD-10-CM

## 2024-01-18 DIAGNOSIS — M79.10 MYALGIA: ICD-10-CM

## 2024-01-18 DIAGNOSIS — M79.7 FIBROMYALGIA: ICD-10-CM

## 2024-01-18 DIAGNOSIS — G89.29 CHRONIC BILATERAL LOW BACK PAIN WITHOUT SCIATICA: Primary | ICD-10-CM

## 2024-01-18 DIAGNOSIS — M25.562 CHRONIC PAIN OF LEFT KNEE: ICD-10-CM

## 2024-01-18 DIAGNOSIS — Z85.71 H/O HODGKIN'S LYMPHOMA: ICD-10-CM

## 2024-01-18 DIAGNOSIS — M54.50 CHRONIC BILATERAL LOW BACK PAIN WITHOUT SCIATICA: Primary | ICD-10-CM

## 2024-01-18 DIAGNOSIS — T45.1X5A PERIPHERAL NEUROPATHY DUE TO CHEMOTHERAPY (CMD): ICD-10-CM

## 2024-01-18 PROCEDURE — 99214 OFFICE O/P EST MOD 30 MIN: CPT | Performed by: PEDIATRICS

## 2024-01-18 RX ORDER — PREGABALIN 75 MG/1
75 CAPSULE ORAL 2 TIMES DAILY
Qty: 60 CAPSULE | Refills: 1 | Status: SHIPPED | OUTPATIENT
Start: 2024-01-18

## 2024-01-23 ASSESSMENT — PATIENT HEALTH QUESTIONNAIRE - PHQ9
SUM OF ALL RESPONSES TO PHQ9 QUESTIONS 1 AND 2: 2
2. FEELING DOWN, DEPRESSED OR HOPELESS: SEVERAL DAYS
CLINICAL INTERPRETATION OF PHQ2 SCORE: 2
SUM OF ALL RESPONSES TO PHQ9 QUESTIONS 1 AND 2: 2
SUM OF ALL RESPONSES TO PHQ9 QUESTIONS 1 AND 2: 2
CLINICAL INTERPRETATION OF PHQ2 SCORE: NO FURTHER SCREENING NEEDED
CLINICAL INTERPRETATION OF PHQ2 SCORE: NO FURTHER SCREENING NEEDED
1. LITTLE INTEREST OR PLEASURE IN DOING THINGS: SEVERAL DAYS
CLINICAL INTERPRETATION OF PHQ2 SCORE: NO FURTHER SCREENING NEEDED

## 2024-01-24 ENCOUNTER — TELEPHONE (OUTPATIENT)
Dept: PHYSICAL THERAPY | Age: 49
End: 2024-01-24

## 2024-01-24 ENCOUNTER — APPOINTMENT (OUTPATIENT)
Dept: PHYSICAL THERAPY | Age: 49
End: 2024-01-24

## 2024-01-25 ASSESSMENT — PATIENT HEALTH QUESTIONNAIRE - PHQ9
1. LITTLE INTEREST OR PLEASURE IN DOING THINGS: SEVERAL DAYS
CLINICAL INTERPRETATION OF PHQ2 SCORE: 2
CLINICAL INTERPRETATION OF PHQ2 SCORE: 2
1. LITTLE INTEREST OR PLEASURE IN DOING THINGS: SEVERAL DAYS
2. FEELING DOWN, DEPRESSED OR HOPELESS: SEVERAL DAYS
CLINICAL INTERPRETATION OF PHQ2 SCORE: NO FURTHER SCREENING NEEDED
SUM OF ALL RESPONSES TO PHQ9 QUESTIONS 1 AND 2: 2
2. FEELING DOWN, DEPRESSED OR HOPELESS: SEVERAL DAYS

## 2024-01-26 ENCOUNTER — APPOINTMENT (OUTPATIENT)
Dept: INTERNAL MEDICINE | Age: 49
End: 2024-01-26

## 2024-01-26 ENCOUNTER — APPOINTMENT (OUTPATIENT)
Dept: FAMILY MEDICINE | Age: 49
End: 2024-01-26

## 2024-01-29 ENCOUNTER — APPOINTMENT (OUTPATIENT)
Dept: FAMILY MEDICINE | Age: 49
End: 2024-01-29

## 2024-01-29 ENCOUNTER — LAB SERVICES (OUTPATIENT)
Dept: LAB | Age: 49
End: 2024-01-29

## 2024-01-29 VITALS
DIASTOLIC BLOOD PRESSURE: 82 MMHG | HEART RATE: 91 BPM | SYSTOLIC BLOOD PRESSURE: 115 MMHG | HEIGHT: 68 IN | WEIGHT: 200.4 LBS | TEMPERATURE: 96.8 F | BODY MASS INDEX: 30.37 KG/M2 | OXYGEN SATURATION: 100 % | RESPIRATION RATE: 16 BRPM

## 2024-01-29 DIAGNOSIS — R29.898 MUSCULAR DECONDITIONING: ICD-10-CM

## 2024-01-29 DIAGNOSIS — Z00.00 MEDICARE ANNUAL WELLNESS VISIT, SUBSEQUENT: ICD-10-CM

## 2024-01-29 DIAGNOSIS — Z00.00 MEDICARE ANNUAL WELLNESS VISIT, SUBSEQUENT: Primary | ICD-10-CM

## 2024-01-29 DIAGNOSIS — I25.10 CORONARY ARTERY DISEASE INVOLVING NATIVE HEART, UNSPECIFIED VESSEL OR LESION TYPE, UNSPECIFIED WHETHER ANGINA PRESENT: ICD-10-CM

## 2024-01-29 DIAGNOSIS — F43.20 ADJUSTMENT DISORDER, UNSPECIFIED TYPE: ICD-10-CM

## 2024-01-29 DIAGNOSIS — R74.8 ELEVATED ALKALINE PHOSPHATASE LEVEL: ICD-10-CM

## 2024-01-29 DIAGNOSIS — E78.2 MIXED HYPERLIPIDEMIA: ICD-10-CM

## 2024-01-29 DIAGNOSIS — I25.2 HX OF NON-ST ELEVATION MYOCARDIAL INFARCTION (NSTEMI): ICD-10-CM

## 2024-01-29 DIAGNOSIS — M79.7 FIBROMYALGIA: ICD-10-CM

## 2024-01-29 DIAGNOSIS — G89.29 CHRONIC PAIN OF LEFT KNEE: ICD-10-CM

## 2024-01-29 DIAGNOSIS — M25.562 CHRONIC PAIN OF LEFT KNEE: ICD-10-CM

## 2024-01-29 DIAGNOSIS — I10 ESSENTIAL HYPERTENSION: ICD-10-CM

## 2024-01-29 LAB
ALBUMIN SERPL-MCNC: 3.8 G/DL (ref 3.6–5.1)
ALBUMIN/GLOB SERPL: 1 {RATIO} (ref 1–2.4)
ALP SERPL-CCNC: 228 UNITS/L (ref 45–117)
ALT SERPL-CCNC: 75 UNITS/L
ANION GAP SERPL CALC-SCNC: 15 MMOL/L (ref 7–19)
AST SERPL-CCNC: 29 UNITS/L
BASOPHILS # BLD: 0.1 K/MCL (ref 0–0.3)
BASOPHILS NFR BLD: 1 %
BILIRUB SERPL-MCNC: 0.4 MG/DL (ref 0.2–1)
BUN SERPL-MCNC: 15 MG/DL (ref 6–20)
BUN/CREAT SERPL: 12 (ref 7–25)
CALCIUM SERPL-MCNC: 9.6 MG/DL (ref 8.4–10.2)
CHLORIDE SERPL-SCNC: 105 MMOL/L (ref 97–110)
CHOLEST SERPL-MCNC: 196 MG/DL
CHOLEST/HDLC SERPL: 5.4 {RATIO}
CO2 SERPL-SCNC: 30 MMOL/L (ref 21–32)
CREAT SERPL-MCNC: 1.26 MG/DL (ref 0.67–1.17)
DEPRECATED RDW RBC: 48 FL (ref 39–50)
EGFRCR SERPLBLD CKD-EPI 2021: 70 ML/MIN/{1.73_M2}
EOSINOPHIL # BLD: 0.2 K/MCL (ref 0–0.5)
EOSINOPHIL NFR BLD: 3 %
ERYTHROCYTE [DISTWIDTH] IN BLOOD: 14.1 % (ref 11–15)
FASTING DURATION TIME PATIENT: 12 HOURS (ref 0–999)
GLOBULIN SER-MCNC: 3.8 G/DL (ref 2–4)
GLUCOSE SERPL-MCNC: 104 MG/DL (ref 70–99)
HBA1C MFR BLD: 5.7 % (ref 4.5–5.6)
HCT VFR BLD CALC: 46.7 % (ref 39–51)
HDLC SERPL-MCNC: 36 MG/DL
HGB BLD-MCNC: 15.7 G/DL (ref 13–17)
IMM GRANULOCYTES # BLD AUTO: 0 K/MCL (ref 0–0.2)
IMM GRANULOCYTES # BLD: 1 %
LDLC SERPL CALC-MCNC: ABNORMAL MG/DL
LDLC SERPL DIRECT ASSAY-MCNC: 73 MG/DL
LYMPHOCYTES # BLD: 3.1 K/MCL (ref 1–4.8)
LYMPHOCYTES NFR BLD: 40 %
MCH RBC QN AUTO: 31.5 PG (ref 26–34)
MCHC RBC AUTO-ENTMCNC: 33.6 G/DL (ref 32–36.5)
MCV RBC AUTO: 93.8 FL (ref 78–100)
MONOCYTES # BLD: 0.5 K/MCL (ref 0.3–0.9)
MONOCYTES NFR BLD: 6 %
NEUTROPHILS # BLD: 3.9 K/MCL (ref 1.8–7.7)
NEUTROPHILS NFR BLD: 49 %
NONHDLC SERPL-MCNC: 160 MG/DL
NRBC BLD MANUAL-RTO: 0 /100 WBC
PLATELET # BLD AUTO: 182 K/MCL (ref 140–450)
POTASSIUM SERPL-SCNC: 4.9 MMOL/L (ref 3.4–5.1)
PROT SERPL-MCNC: 7.6 G/DL (ref 6.4–8.2)
RBC # BLD: 4.98 MIL/MCL (ref 4.5–5.9)
SODIUM SERPL-SCNC: 145 MMOL/L (ref 135–145)
TRIGL SERPL-MCNC: 433 MG/DL
TSH SERPL-ACNC: 1.62 MCUNITS/ML (ref 0.35–5)
WBC # BLD: 7.8 K/MCL (ref 4.2–11)

## 2024-01-29 PROCEDURE — G0439 PPPS, SUBSEQ VISIT: HCPCS

## 2024-01-29 PROCEDURE — 36415 COLL VENOUS BLD VENIPUNCTURE: CPT

## 2024-01-29 PROCEDURE — 99214 OFFICE O/P EST MOD 30 MIN: CPT

## 2024-01-29 ASSESSMENT — ENCOUNTER SYMPTOMS
PSYCHIATRIC NEGATIVE: 1
FEVER: 0
LIGHT-HEADEDNESS: 0
CONSTIPATION: 0
NAUSEA: 0
CHILLS: 0
DIARRHEA: 0
ABDOMINAL PAIN: 0
SHORTNESS OF BREATH: 0
DIZZINESS: 0
UNEXPECTED WEIGHT CHANGE: 0
FATIGUE: 1
RHINORRHEA: 0
VOMITING: 0

## 2024-01-29 ASSESSMENT — PATIENT HEALTH QUESTIONNAIRE - PHQ9
SUM OF ALL RESPONSES TO PHQ9 QUESTIONS 1 AND 2: 1
CLINICAL INTERPRETATION OF PHQ2 SCORE: NO FURTHER SCREENING NEEDED
2. FEELING DOWN, DEPRESSED OR HOPELESS: SEVERAL DAYS
SUM OF ALL RESPONSES TO PHQ9 QUESTIONS 1 AND 2: 1
1. LITTLE INTEREST OR PLEASURE IN DOING THINGS: NOT AT ALL

## 2024-01-31 ENCOUNTER — APPOINTMENT (OUTPATIENT)
Dept: PHYSICAL THERAPY | Age: 49
End: 2024-01-31

## 2024-01-31 DIAGNOSIS — G89.29 CHRONIC PAIN OF LEFT KNEE: Primary | ICD-10-CM

## 2024-01-31 DIAGNOSIS — R26.2 DIFFICULTY WALKING: ICD-10-CM

## 2024-01-31 DIAGNOSIS — M25.562 CHRONIC PAIN OF LEFT KNEE: Primary | ICD-10-CM

## 2024-01-31 DIAGNOSIS — R29.898 WEAKNESS OF BOTH HIPS: ICD-10-CM

## 2024-01-31 DIAGNOSIS — M25.362 KNEE INSTABILITY, LEFT: ICD-10-CM

## 2024-02-01 LAB
ALP BONE CFR SERPL: 12.8 % (ref 10.7–68.3)
ALP BONE CFR SERPL: 29.8 U/L (ref 12.9–52.6)
ALP INTEST CFR SERPL: 0 % (ref 0–24.2)
ALP INTEST CFR SERPL: 0 U/L (ref 0–16.3)
ALP LIVER CFR SERPL: 203.2 U/L (ref 16–69.3)
ALP LIVER CFR SERPL: 87.2 % (ref 26–86.2)
ALP SERPL-CCNC: 233 U/L (ref 38–113)

## 2024-02-01 ASSESSMENT — MOVEMENT AND STRENGTH ASSESSMENTS
STANDING FOR 1 HOUR: EXTREME DIFFICULTY OR UNABLE TO PERFORM ACTIVITY
MAKING SHARP TURNS WHILE RUNNING FAST: EXTREME DIFFICULTY OR UNABLE TO PERFORM ACTIVITY
LIFTING AN OBJECT, LIKE A BAG OF GROCERIES, FROM THE FLOOR: A LITTLE BIT OF DIFFICULTY
WALKING BETWEEN ROOMS: MODERATE DIFFICULTY
RUNNING ON UNEVEN GROUND: EXTREME DIFFICULTY OR UNABLE TO PERFORM ACTIVITY
WALKING A MILE: EXTREME DIFFICULTY OR UNABLE TO PERFORM ACTIVITY
HOPPING: EXTREME DIFFICULTY OR UNABLE TO PERFORM ACTIVITY
WALKING 2 BLOCKS: QUITE A BIT OF DIFFICULTY
SITTING FOR 1 HOUR: NO DIFFICULTY
SQUATTING: QUITE A BIT OF DIFFICULTY
ROLLING OVER IN BED: MODERATE DIFFICULTY
GETTING INTO OR OUT OF A CAR: MODERATE DIFFICULTY
GOING UP OR DOWN 10 STAIRS (ABOUT 1 FLIGHT OF STAIRS): EXTREME DIFFICULTY OR UNABLE TO PERFORM ACTIVITY
ANY OF YOUR USUAL WORK, HOUSEWORK OR SCHOOL ACTIVITIES: MODERATE DIFFICULTY
PERFORMING LIGHT ACTIVITES AROUND YOUR HOME: A LITTLE BIT OF DIFFICULTY
GETTING INTO OR OUT OF THE BATH: MODERATE DIFFICULTY
PUTTING ON YOUR SHOES OR SOCKS: MODERATE DIFFICULTY
YOUR USUAL HOBBIES, RECREATIONAL OR SPORTING ACTIVIITIES: MODERATE DIFFICULTY
RUNNING ON EVEN GROUND: EXTREME DIFFICULTY OR UNABLE TO PERFORM ACTIVITY
PERFORMING HEAVY ACTIVITIES AROUND YOUR HOME: EXTREME DIFFICULTY OR UNABLE TO PERFORM ACTIVITY
TOTAL SCORE: 32.5

## 2024-02-01 ASSESSMENT — ENCOUNTER SYMPTOMS: PAIN SCALE AT HIGHEST: 10

## 2024-02-02 DIAGNOSIS — R74.8 ELEVATED LIVER ENZYMES: Primary | ICD-10-CM

## 2024-02-06 ENCOUNTER — APPOINTMENT (OUTPATIENT)
Dept: NUTRITION | Age: 49
End: 2024-02-06

## 2024-02-06 DIAGNOSIS — E78.00 PURE HYPERCHOLESTEROLEMIA: Primary | ICD-10-CM

## 2024-02-06 PROCEDURE — 99211 OFF/OP EST MAY X REQ PHY/QHP: CPT | Performed by: DIETITIAN, REGISTERED

## 2024-02-15 RX ORDER — AMITRIPTYLINE HYDROCHLORIDE 100 MG/1
100 TABLET ORAL NIGHTLY
Qty: 90 TABLET | Refills: 1 | Status: SHIPPED | OUTPATIENT
Start: 2024-02-15

## 2024-03-05 ENCOUNTER — TELEPHONE (OUTPATIENT)
Dept: GASTROENTEROLOGY | Age: 49
End: 2024-03-05

## 2024-03-05 ENCOUNTER — E-ADVICE (OUTPATIENT)
Dept: GASTROENTEROLOGY | Age: 49
End: 2024-03-05

## 2024-03-14 ENCOUNTER — APPOINTMENT (OUTPATIENT)
Dept: GASTROENTEROLOGY | Age: 49
End: 2024-03-14

## 2024-03-14 ENCOUNTER — TELEPHONE (OUTPATIENT)
Dept: INTERNAL MEDICINE | Age: 49
End: 2024-03-14

## 2024-03-15 RX ORDER — FAMOTIDINE 20 MG/1
20 TABLET, FILM COATED ORAL 2 TIMES DAILY
Qty: 180 TABLET | Refills: 3 | Status: SHIPPED | OUTPATIENT
Start: 2024-03-15

## 2024-03-15 RX ORDER — ATORVASTATIN CALCIUM 20 MG/1
20 TABLET, FILM COATED ORAL DAILY
Qty: 90 TABLET | Refills: 3 | Status: SHIPPED | OUTPATIENT
Start: 2024-03-15

## 2024-03-19 ENCOUNTER — APPOINTMENT (OUTPATIENT)
Dept: NUTRITION | Age: 49
End: 2024-03-19

## 2024-03-28 ENCOUNTER — TELEPHONE (OUTPATIENT)
Dept: GASTROENTEROLOGY | Age: 49
End: 2024-03-28

## 2024-03-29 ENCOUNTER — TELEPHONE (OUTPATIENT)
Dept: INTERNAL MEDICINE | Age: 49
End: 2024-03-29

## 2024-04-10 ENCOUNTER — TELEPHONE (OUTPATIENT)
Dept: PAIN MANAGEMENT | Age: 49
End: 2024-04-10

## 2024-04-12 ENCOUNTER — APPOINTMENT (OUTPATIENT)
Dept: GASTROENTEROLOGY | Age: 49
End: 2024-04-12
Attending: INTERNAL MEDICINE

## 2024-04-23 ENCOUNTER — APPOINTMENT (OUTPATIENT)
Dept: NUTRITION | Age: 49
End: 2024-04-23

## 2024-04-23 DIAGNOSIS — R73.01 IFG (IMPAIRED FASTING GLUCOSE): Primary | ICD-10-CM

## 2024-04-23 PROCEDURE — 99211 OFF/OP EST MAY X REQ PHY/QHP: CPT | Performed by: DIETITIAN, REGISTERED

## 2024-04-29 ENCOUNTER — TELEPHONE (OUTPATIENT)
Dept: INTERNAL MEDICINE | Age: 49
End: 2024-04-29

## 2024-04-29 ENCOUNTER — LAB SERVICES (OUTPATIENT)
Dept: LAB | Age: 49
End: 2024-04-29

## 2024-04-29 ENCOUNTER — APPOINTMENT (OUTPATIENT)
Dept: INTERNAL MEDICINE | Age: 49
End: 2024-04-29

## 2024-04-29 VITALS
OXYGEN SATURATION: 96 % | HEART RATE: 81 BPM | TEMPERATURE: 96.7 F | HEIGHT: 68 IN | DIASTOLIC BLOOD PRESSURE: 84 MMHG | SYSTOLIC BLOOD PRESSURE: 108 MMHG | WEIGHT: 194.5 LBS | BODY MASS INDEX: 29.48 KG/M2

## 2024-04-29 DIAGNOSIS — G62.0 PERIPHERAL NEUROPATHY DUE TO CHEMOTHERAPY  (CMD): ICD-10-CM

## 2024-04-29 DIAGNOSIS — T45.1X5A PERIPHERAL NEUROPATHY DUE TO CHEMOTHERAPY  (CMD): ICD-10-CM

## 2024-04-29 DIAGNOSIS — R74.8 ELEVATED LIVER ENZYMES: ICD-10-CM

## 2024-04-29 DIAGNOSIS — I10 ESSENTIAL HYPERTENSION: ICD-10-CM

## 2024-04-29 DIAGNOSIS — E78.00 PURE HYPERCHOLESTEROLEMIA: Primary | ICD-10-CM

## 2024-04-29 DIAGNOSIS — F33.0 MILD RECURRENT MAJOR DEPRESSION (CMD): ICD-10-CM

## 2024-04-29 DIAGNOSIS — E78.00 PURE HYPERCHOLESTEROLEMIA: ICD-10-CM

## 2024-04-29 DIAGNOSIS — Z12.5 SPECIAL SCREENING FOR MALIGNANT NEOPLASM OF PROSTATE: ICD-10-CM

## 2024-04-29 DIAGNOSIS — E03.9 HYPOTHYROIDISM, UNSPECIFIED TYPE: ICD-10-CM

## 2024-04-29 DIAGNOSIS — M79.7 FIBROMYALGIA: ICD-10-CM

## 2024-04-29 DIAGNOSIS — K21.9 GASTROESOPHAGEAL REFLUX DISEASE WITHOUT ESOPHAGITIS: ICD-10-CM

## 2024-04-29 DIAGNOSIS — M54.50 CHRONIC BILATERAL LOW BACK PAIN WITHOUT SCIATICA: ICD-10-CM

## 2024-04-29 DIAGNOSIS — G89.29 CHRONIC BILATERAL LOW BACK PAIN WITHOUT SCIATICA: ICD-10-CM

## 2024-04-29 DIAGNOSIS — I25.10 CORONARY ARTERY DISEASE INVOLVING NATIVE CORONARY ARTERY OF NATIVE HEART WITHOUT ANGINA PECTORIS: ICD-10-CM

## 2024-04-29 DIAGNOSIS — M79.10 MYALGIA: ICD-10-CM

## 2024-04-29 LAB
ALBUMIN SERPL-MCNC: 3.9 G/DL (ref 3.6–5.1)
ALBUMIN/GLOB SERPL: 1.1 {RATIO} (ref 1–2.4)
ALP SERPL-CCNC: 197 UNITS/L (ref 45–117)
ALT SERPL-CCNC: 83 UNITS/L
ANION GAP SERPL CALC-SCNC: 13 MMOL/L (ref 7–19)
AST SERPL-CCNC: 31 UNITS/L
BASOPHILS # BLD: 0.1 K/MCL (ref 0–0.3)
BASOPHILS NFR BLD: 1 %
BILIRUB SERPL-MCNC: 0.4 MG/DL (ref 0.2–1)
BUN SERPL-MCNC: 13 MG/DL (ref 6–20)
BUN/CREAT SERPL: 11 (ref 7–25)
CALCIUM SERPL-MCNC: 9.6 MG/DL (ref 8.4–10.2)
CHLORIDE SERPL-SCNC: 101 MMOL/L (ref 97–110)
CO2 SERPL-SCNC: 31 MMOL/L (ref 21–32)
CREAT SERPL-MCNC: 1.22 MG/DL (ref 0.67–1.17)
DEPRECATED RDW RBC: 41.9 FL (ref 39–50)
EGFRCR SERPLBLD CKD-EPI 2021: 72 ML/MIN/{1.73_M2}
EOSINOPHIL # BLD: 0.3 K/MCL (ref 0–0.5)
EOSINOPHIL NFR BLD: 3 %
ERYTHROCYTE [DISTWIDTH] IN BLOOD: 12.6 % (ref 11–15)
FASTING DURATION TIME PATIENT: ABNORMAL H
FOLATE SERPL-MCNC: 5.6 NG/ML
GLOBULIN SER-MCNC: 3.7 G/DL (ref 2–4)
GLUCOSE SERPL-MCNC: 90 MG/DL (ref 70–99)
HCT VFR BLD CALC: 44.2 % (ref 39–51)
HGB BLD-MCNC: 15 G/DL (ref 13–17)
IMM GRANULOCYTES # BLD AUTO: 0 K/MCL (ref 0–0.2)
IMM GRANULOCYTES # BLD: 0 %
LYMPHOCYTES # BLD: 3.3 K/MCL (ref 1–4.8)
LYMPHOCYTES NFR BLD: 38 %
MCH RBC QN AUTO: 31 PG (ref 26–34)
MCHC RBC AUTO-ENTMCNC: 33.9 G/DL (ref 32–36.5)
MCV RBC AUTO: 91.3 FL (ref 78–100)
MONOCYTES # BLD: 0.5 K/MCL (ref 0.3–0.9)
MONOCYTES NFR BLD: 6 %
NEUTROPHILS # BLD: 4.5 K/MCL (ref 1.8–7.7)
NEUTROPHILS NFR BLD: 52 %
NRBC BLD MANUAL-RTO: 0 /100 WBC
PLATELET # BLD AUTO: 181 K/MCL (ref 140–450)
POTASSIUM SERPL-SCNC: 4.8 MMOL/L (ref 3.4–5.1)
PROT SERPL-MCNC: 7.6 G/DL (ref 6.4–8.2)
PSA SERPL-MCNC: 1.12 NG/ML
RBC # BLD: 4.84 MIL/MCL (ref 4.5–5.9)
SODIUM SERPL-SCNC: 140 MMOL/L (ref 135–145)
TSH SERPL-ACNC: 1.91 MCUNITS/ML (ref 0.35–5)
VIT B12 SERPL-MCNC: 464 PG/ML (ref 211–911)
WBC # BLD: 8.7 K/MCL (ref 4.2–11)

## 2024-04-29 PROCEDURE — 3079F DIAST BP 80-89 MM HG: CPT | Performed by: INTERNAL MEDICINE

## 2024-04-29 PROCEDURE — 99214 OFFICE O/P EST MOD 30 MIN: CPT | Performed by: INTERNAL MEDICINE

## 2024-04-29 PROCEDURE — 3074F SYST BP LT 130 MM HG: CPT | Performed by: INTERNAL MEDICINE

## 2024-04-29 PROCEDURE — 36415 COLL VENOUS BLD VENIPUNCTURE: CPT | Performed by: INTERNAL MEDICINE

## 2024-04-29 RX ORDER — ICOSAPENT ETHYL 1 G/1
2 CAPSULE ORAL 2 TIMES DAILY
Qty: 360 CAPSULE | Refills: 1 | Status: SHIPPED | OUTPATIENT
Start: 2024-04-29

## 2024-04-29 RX ORDER — ASPIRIN 81 MG/1
81 TABLET, COATED ORAL DAILY
Qty: 90 TABLET | Refills: 3 | Status: SHIPPED | OUTPATIENT
Start: 2024-04-29

## 2024-04-29 RX ORDER — TIZANIDINE 4 MG/1
4 TABLET ORAL EVERY 12 HOURS
Qty: 180 TABLET | Refills: 1 | Status: SHIPPED | OUTPATIENT
Start: 2024-04-29

## 2024-04-29 RX ORDER — PREGABALIN 75 MG/1
75 CAPSULE ORAL 2 TIMES DAILY
Qty: 180 CAPSULE | Refills: 3 | Status: SHIPPED | OUTPATIENT
Start: 2024-04-29

## 2024-05-02 DIAGNOSIS — R74.8 ELEVATED LIVER ENZYMES: Primary | ICD-10-CM

## 2024-05-02 LAB
ALP BONE SERPL-CCNC: 52 U/L (ref 0–55)
ALP LIVER SERPL-CCNC: 156 U/L (ref 0–94)
ALP OTHER SERPL-CCNC: 0 U/L
ALP SERPL-CCNC: 208 U/L (ref 40–120)

## 2024-05-09 ENCOUNTER — E-ADVICE (OUTPATIENT)
Dept: GASTROENTEROLOGY | Age: 49
End: 2024-05-09

## 2024-05-09 ENCOUNTER — APPOINTMENT (OUTPATIENT)
Dept: GASTROENTEROLOGY | Age: 49
End: 2024-05-09
Attending: INTERNAL MEDICINE

## 2024-05-21 RX ORDER — LEVOTHYROXINE SODIUM 0.05 MG/1
50 TABLET ORAL DAILY
Qty: 90 TABLET | Refills: 0 | Status: SHIPPED | OUTPATIENT
Start: 2024-05-21

## 2024-05-23 ENCOUNTER — APPOINTMENT (OUTPATIENT)
Dept: ULTRASOUND IMAGING | Age: 49
End: 2024-05-23
Attending: INTERNAL MEDICINE

## 2024-05-28 ENCOUNTER — E-ADVICE (OUTPATIENT)
Dept: NUTRITION | Age: 49
End: 2024-05-28

## 2024-05-28 ENCOUNTER — APPOINTMENT (OUTPATIENT)
Dept: NUTRITION | Age: 49
End: 2024-05-28

## 2024-05-28 ENCOUNTER — E-ADVICE (OUTPATIENT)
Dept: OTHER | Age: 49
End: 2024-05-28

## 2024-05-28 DIAGNOSIS — R73.01 IFG (IMPAIRED FASTING GLUCOSE): Primary | ICD-10-CM

## 2024-05-28 PROCEDURE — 99211 OFF/OP EST MAY X REQ PHY/QHP: CPT | Performed by: DIETITIAN, REGISTERED

## 2024-05-29 RX ORDER — ISOSORBIDE MONONITRATE 30 MG/1
30 TABLET, EXTENDED RELEASE ORAL DAILY
Qty: 90 TABLET | Refills: 1 | Status: SHIPPED | OUTPATIENT
Start: 2024-05-29 | End: 2024-05-30 | Stop reason: SDUPTHER

## 2024-05-30 DIAGNOSIS — G89.29 CHRONIC BILATERAL LOW BACK PAIN WITHOUT SCIATICA: ICD-10-CM

## 2024-05-30 DIAGNOSIS — T45.1X5A PERIPHERAL NEUROPATHY DUE TO CHEMOTHERAPY  (CMD): ICD-10-CM

## 2024-05-30 DIAGNOSIS — M25.562 CHRONIC PAIN OF LEFT KNEE: Primary | ICD-10-CM

## 2024-05-30 DIAGNOSIS — M79.7 FIBROMYALGIA: ICD-10-CM

## 2024-05-30 DIAGNOSIS — M54.50 CHRONIC BILATERAL LOW BACK PAIN WITHOUT SCIATICA: ICD-10-CM

## 2024-05-30 DIAGNOSIS — M79.10 MYALGIA: ICD-10-CM

## 2024-05-30 DIAGNOSIS — G89.29 CHRONIC PAIN OF LEFT KNEE: Primary | ICD-10-CM

## 2024-05-30 DIAGNOSIS — G62.0 PERIPHERAL NEUROPATHY DUE TO CHEMOTHERAPY  (CMD): ICD-10-CM

## 2024-05-31 RX ORDER — ICOSAPENT ETHYL 1 G/1
2 CAPSULE ORAL 2 TIMES DAILY
Qty: 360 CAPSULE | Refills: 1 | Status: SHIPPED | OUTPATIENT
Start: 2024-05-31

## 2024-05-31 RX ORDER — AMITRIPTYLINE HYDROCHLORIDE 100 MG/1
100 TABLET ORAL NIGHTLY
Qty: 90 TABLET | Refills: 1 | Status: SHIPPED | OUTPATIENT
Start: 2024-05-31

## 2024-05-31 RX ORDER — ISOSORBIDE MONONITRATE 30 MG/1
30 TABLET, EXTENDED RELEASE ORAL DAILY
Qty: 90 TABLET | Refills: 1 | Status: SHIPPED | OUTPATIENT
Start: 2024-05-31

## 2024-05-31 RX ORDER — ATORVASTATIN CALCIUM 20 MG/1
20 TABLET, FILM COATED ORAL DAILY
Qty: 90 TABLET | Refills: 3 | Status: SHIPPED | OUTPATIENT
Start: 2024-05-31

## 2024-05-31 RX ORDER — IBUPROFEN 800 MG/1
800 TABLET ORAL EVERY 8 HOURS PRN
Qty: 30 TABLET | Refills: 3 | Status: SHIPPED | OUTPATIENT
Start: 2024-05-31

## 2024-05-31 RX ORDER — PREGABALIN 75 MG/1
75 CAPSULE ORAL 2 TIMES DAILY
Qty: 180 CAPSULE | Refills: 3 | Status: SHIPPED | OUTPATIENT
Start: 2024-05-31

## 2024-06-10 ENCOUNTER — APPOINTMENT (OUTPATIENT)
Dept: ULTRASOUND IMAGING | Age: 49
End: 2024-06-10
Attending: INTERNAL MEDICINE

## 2024-06-10 DIAGNOSIS — R74.8 ELEVATED LIVER ENZYMES: ICD-10-CM

## 2024-06-10 PROCEDURE — 76705 ECHO EXAM OF ABDOMEN: CPT | Performed by: RADIOLOGY

## 2024-07-02 ENCOUNTER — APPOINTMENT (OUTPATIENT)
Dept: NUTRITION | Age: 49
End: 2024-07-02

## 2024-07-02 DIAGNOSIS — E78.00 PURE HYPERCHOLESTEROLEMIA: Primary | ICD-10-CM

## 2024-07-05 ENCOUNTER — E-ADVICE (OUTPATIENT)
Dept: NUTRITION | Age: 49
End: 2024-07-05

## 2024-07-17 ENCOUNTER — TELEPHONE (OUTPATIENT)
Dept: INTERNAL MEDICINE | Age: 49
End: 2024-07-17

## 2024-08-05 ENCOUNTER — TELEPHONE (OUTPATIENT)
Dept: GASTROENTEROLOGY | Age: 49
End: 2024-08-05

## 2024-08-08 ENCOUNTER — ANESTHESIA EVENT (OUTPATIENT)
Dept: GASTROENTEROLOGY | Age: 49
End: 2024-08-08

## 2024-08-08 ENCOUNTER — ANESTHESIA (OUTPATIENT)
Dept: GASTROENTEROLOGY | Age: 49
End: 2024-08-08

## 2024-08-08 ENCOUNTER — HOSPITAL ENCOUNTER (OUTPATIENT)
Dept: GASTROENTEROLOGY | Age: 49
Discharge: HOME | End: 2024-08-08
Attending: INTERNAL MEDICINE

## 2024-08-08 VITALS
TEMPERATURE: 97 F | HEART RATE: 66 BPM | HEIGHT: 68 IN | SYSTOLIC BLOOD PRESSURE: 125 MMHG | WEIGHT: 199.52 LBS | RESPIRATION RATE: 11 BRPM | BODY MASS INDEX: 30.24 KG/M2 | OXYGEN SATURATION: 97 % | DIASTOLIC BLOOD PRESSURE: 83 MMHG

## 2024-08-08 DIAGNOSIS — K22.10 ESOPHAGEAL ULCER WITHOUT BLEEDING: ICD-10-CM

## 2024-08-08 DIAGNOSIS — K64.9 HEMORRHOIDS, UNSPECIFIED HEMORRHOID TYPE: ICD-10-CM

## 2024-08-08 DIAGNOSIS — K63.5 POLYP OF TRANSVERSE COLON, UNSPECIFIED TYPE: ICD-10-CM

## 2024-08-08 DIAGNOSIS — R12 HEARTBURN: ICD-10-CM

## 2024-08-08 DIAGNOSIS — Z12.11 SPECIAL SCREENING FOR MALIGNANT NEOPLASMS, COLON: ICD-10-CM

## 2024-08-08 DIAGNOSIS — K29.60 OTHER GASTRITIS WITHOUT HEMORRHAGE, UNSPECIFIED CHRONICITY: ICD-10-CM

## 2024-08-08 LAB — COLONOSCOPY STUDY: ABNORMAL

## 2024-08-08 PROCEDURE — 10002807 HB RX 258: Performed by: INTERNAL MEDICINE

## 2024-08-08 PROCEDURE — 13000028 HB GI MAJOR COMPLEX CASE S/U + 1ST 15 MIN

## 2024-08-08 PROCEDURE — 13000029 HB GI MAJOR COMPLEX CASE EA ADD MINUTE

## 2024-08-08 PROCEDURE — 10004451 HB PACU RECOVERY 1ST 30 MINUTES

## 2024-08-08 PROCEDURE — 10002800 HB RX 250 W HCPCS: Performed by: ANESTHESIOLOGY

## 2024-08-08 PROCEDURE — 13000001 HB PHASE II RECOVERY EA 30 MINUTES

## 2024-08-08 PROCEDURE — 10002801 HB RX 250 W/O HCPCS: Performed by: ANESTHESIOLOGY

## 2024-08-08 PROCEDURE — 13000008 HB ANESTHESIA MAC OUTSIDE OR

## 2024-08-08 RX ORDER — SODIUM CHLORIDE 9 MG/ML
INJECTION, SOLUTION INTRAVENOUS CONTINUOUS
Status: DISCONTINUED | OUTPATIENT
Start: 2024-08-08 | End: 2024-08-10 | Stop reason: HOSPADM

## 2024-08-08 RX ORDER — LIDOCAINE HYDROCHLORIDE 20 MG/ML
INJECTION, SOLUTION INFILTRATION; PERINEURAL PRN
Status: DISCONTINUED | OUTPATIENT
Start: 2024-08-08 | End: 2024-08-08

## 2024-08-08 RX ORDER — PROPOFOL 10 MG/ML
INJECTION, EMULSION INTRAVENOUS PRN
Status: DISCONTINUED | OUTPATIENT
Start: 2024-08-08 | End: 2024-08-08

## 2024-08-08 RX ADMIN — FENTANYL CITRATE 100 MCG: 50 INJECTION INTRAMUSCULAR; INTRAVENOUS at 08:11

## 2024-08-08 RX ADMIN — PROPOFOL 50 MG: 10 INJECTION, EMULSION INTRAVENOUS at 08:11

## 2024-08-08 RX ADMIN — PROPOFOL INJECTABLE EMULSION 100 MCG/KG/MIN: 10 INJECTION, EMULSION INTRAVENOUS at 08:11

## 2024-08-08 RX ADMIN — SODIUM CHLORIDE: 9 INJECTION, SOLUTION INTRAVENOUS at 07:34

## 2024-08-08 RX ADMIN — LIDOCAINE HYDROCHLORIDE 5 ML: 20 INJECTION, SOLUTION INFILTRATION; PERINEURAL at 08:11

## 2024-08-08 SDOH — SOCIAL STABILITY: SOCIAL INSECURITY: HOW OFTEN DOES ANYONE, INCLUDING FAMILY AND FRIENDS, SCREAM OR CURSE AT YOU?: NEVER

## 2024-08-08 SDOH — SOCIAL STABILITY: SOCIAL INSECURITY: HOW OFTEN DOES ANYONE, INCLUDING FAMILY AND FRIENDS, INSULT OR TALK DOWN TO YOU?: NEVER

## 2024-08-08 SDOH — SOCIAL STABILITY: SOCIAL INSECURITY: HOW OFTEN DOES ANYONE, INCLUDING FAMILY AND FRIENDS, PHYSICALLY HURT YOU?: NEVER

## 2024-08-08 SDOH — SOCIAL STABILITY: SOCIAL INSECURITY: HOW OFTEN DOES ANYONE, INCLUDING FAMILY AND FRIENDS, THREATEN YOU WITH HARM?: NEVER

## 2024-08-08 ASSESSMENT — ACTIVITIES OF DAILY LIVING (ADL)
RECENT_DECLINE_ADL: NO
ADL_BEFORE_ADMISSION: INDEPENDENT
MOBILITY_ASSIST_DEVICES: CANE;FRONT-WHEELED WALKER
ADL_SCORE: 12
CHRONIC_PAIN_PRESENT: YES, CHRONIC
DESCRIBE HOW PAIN IMPACTS YOUR LIFE: MOBILITY
HISTORY OF FALLING IN THE LAST YEAR (PRIOR TO ADMISSION): YES
ADL_SHORT_OF_BREATH: NO
NEEDS_ASSIST: NO

## 2024-08-08 ASSESSMENT — PAIN SCALES - WONG BAKER: WONGBAKER_NUMERICALRESPONSE: 0

## 2024-08-08 ASSESSMENT — ENCOUNTER SYMPTOMS: SEIZURES: 1

## 2024-08-08 ASSESSMENT — PAIN SCALES - GENERAL
PAINLEVEL_OUTOF10: 0

## 2024-08-12 LAB
ASR DISCLAIMER: NORMAL
CASE RPRT: NORMAL
CLINICAL INFO: NORMAL
PATH REPORT.FINAL DX SPEC: NORMAL
PATH REPORT.GROSS SPEC: NORMAL

## 2024-08-19 ENCOUNTER — TELEPHONE (OUTPATIENT)
Dept: INTERNAL MEDICINE | Age: 49
End: 2024-08-19

## 2024-08-21 ENCOUNTER — MED INFO FORMS (OUTPATIENT)
Dept: INTERNAL MEDICINE | Age: 49
End: 2024-08-21

## 2024-09-15 RX ORDER — CLOPIDOGREL BISULFATE 75 MG/1
75 TABLET ORAL DAILY
Qty: 90 TABLET | Refills: 1 | Status: SHIPPED | OUTPATIENT
Start: 2024-09-15

## 2024-09-16 ENCOUNTER — APPOINTMENT (OUTPATIENT)
Dept: NUTRITION | Age: 49
End: 2024-09-16

## 2024-09-16 DIAGNOSIS — E78.00 PURE HYPERCHOLESTEROLEMIA: Primary | ICD-10-CM

## 2024-09-19 RX ORDER — PANTOPRAZOLE SODIUM 40 MG/1
40 TABLET, DELAYED RELEASE ORAL 2 TIMES DAILY
Qty: 60 TABLET | Refills: 5 | Status: SHIPPED | OUTPATIENT
Start: 2024-09-19

## 2024-09-20 ENCOUNTER — E-ADVICE (OUTPATIENT)
Dept: NUTRITION | Age: 49
End: 2024-09-20

## 2024-09-20 ENCOUNTER — TELEPHONE (OUTPATIENT)
Dept: GASTROENTEROLOGY | Age: 49
End: 2024-09-20

## 2024-09-26 ENCOUNTER — TELEPHONE (OUTPATIENT)
Dept: GASTROENTEROLOGY | Age: 49
End: 2024-09-26

## 2024-11-03 DIAGNOSIS — T45.1X5A PERIPHERAL NEUROPATHY DUE TO CHEMOTHERAPY  (CMD): ICD-10-CM

## 2024-11-03 DIAGNOSIS — G89.29 CHRONIC BILATERAL LOW BACK PAIN WITHOUT SCIATICA: ICD-10-CM

## 2024-11-03 DIAGNOSIS — M79.10 MYALGIA: ICD-10-CM

## 2024-11-03 DIAGNOSIS — G62.0 PERIPHERAL NEUROPATHY DUE TO CHEMOTHERAPY  (CMD): ICD-10-CM

## 2024-11-03 DIAGNOSIS — M54.50 CHRONIC BILATERAL LOW BACK PAIN WITHOUT SCIATICA: ICD-10-CM

## 2024-11-03 DIAGNOSIS — M79.7 FIBROMYALGIA: ICD-10-CM

## 2024-11-04 RX ORDER — IBUPROFEN 800 MG/1
800 TABLET, FILM COATED ORAL EVERY 8 HOURS PRN
Qty: 30 TABLET | Refills: 1 | Status: SHIPPED | OUTPATIENT
Start: 2024-11-04

## 2024-11-04 RX ORDER — PREGABALIN 75 MG/1
75 CAPSULE ORAL 2 TIMES DAILY
Qty: 180 CAPSULE | Refills: 3 | Status: SHIPPED | OUTPATIENT
Start: 2024-11-04

## 2024-11-18 ENCOUNTER — E-ADVICE (OUTPATIENT)
Dept: NUTRITION | Age: 49
End: 2024-11-18

## 2024-11-18 ENCOUNTER — APPOINTMENT (OUTPATIENT)
Dept: NUTRITION | Age: 49
End: 2024-11-18

## 2024-11-18 DIAGNOSIS — E78.00 PURE HYPERCHOLESTEROLEMIA: Primary | ICD-10-CM

## 2024-11-18 PROCEDURE — 99212 OFFICE O/P EST SF 10 MIN: CPT | Performed by: DIETITIAN, REGISTERED

## 2024-11-21 RX ORDER — METOPROLOL TARTRATE 25 MG/1
TABLET, FILM COATED ORAL
Qty: 180 TABLET | Refills: 1 | Status: SHIPPED | OUTPATIENT
Start: 2024-11-21

## 2024-11-26 RX ORDER — ISOSORBIDE MONONITRATE 30 MG/1
30 TABLET, EXTENDED RELEASE ORAL DAILY
Qty: 90 TABLET | Refills: 0 | Status: SHIPPED | OUTPATIENT
Start: 2024-11-26

## 2025-01-06 ENCOUNTER — TELEPHONE (OUTPATIENT)
Dept: INTERNAL MEDICINE | Age: 50
End: 2025-01-06

## 2025-01-06 DIAGNOSIS — M25.362 KNEE INSTABILITY, LEFT: ICD-10-CM

## 2025-01-06 DIAGNOSIS — R26.89 BALANCE PROBLEM: ICD-10-CM

## 2025-01-06 DIAGNOSIS — G62.0 PERIPHERAL NEUROPATHY DUE TO CHEMOTHERAPY  (CMD): Primary | ICD-10-CM

## 2025-01-06 DIAGNOSIS — T45.1X5A PERIPHERAL NEUROPATHY DUE TO CHEMOTHERAPY  (CMD): Primary | ICD-10-CM

## 2025-01-06 DIAGNOSIS — R26.2 DIFFICULTY WALKING: ICD-10-CM

## 2025-01-09 SDOH — ECONOMIC STABILITY: TRANSPORTATION INSECURITY
IN THE PAST 12 MONTHS, HAS LACK OF RELIABLE TRANSPORTATION KEPT YOU FROM MEDICAL APPOINTMENTS, MEETINGS, WORK OR FROM GETTING THINGS NEEDED FOR DAILY LIVING?: NO

## 2025-01-09 SDOH — ECONOMIC STABILITY: FOOD INSECURITY: WITHIN THE PAST 12 MONTHS, THE FOOD YOU BOUGHT JUST DIDN'T LAST AND YOU DIDN'T HAVE MONEY TO GET MORE.: OFTEN TRUE

## 2025-01-09 SDOH — ECONOMIC STABILITY: GENERAL: WOULD YOU LIKE HELP WITH ANY OF THE FOLLOWING NEEDS?: FOOD

## 2025-01-09 SDOH — ECONOMIC STABILITY: HOUSING INSECURITY: DO YOU HAVE PROBLEMS WITH ANY OF THE FOLLOWING?: PATIENT DECLINED

## 2025-01-09 ASSESSMENT — SOCIAL DETERMINANTS OF HEALTH (SDOH): IN THE PAST 12 MONTHS, HAS THE ELECTRIC, GAS, OIL, OR WATER COMPANY THREATENED TO SHUT OFF SERVICE IN YOUR HOME?: NO

## 2025-01-15 ENCOUNTER — APPOINTMENT (OUTPATIENT)
Dept: FAMILY MEDICINE | Age: 50
End: 2025-01-15

## 2025-01-15 ENCOUNTER — LAB SERVICES (OUTPATIENT)
Dept: LAB | Age: 50
End: 2025-01-15

## 2025-01-15 VITALS
SYSTOLIC BLOOD PRESSURE: 112 MMHG | TEMPERATURE: 96.8 F | DIASTOLIC BLOOD PRESSURE: 75 MMHG | OXYGEN SATURATION: 97 % | RESPIRATION RATE: 16 BRPM | HEART RATE: 85 BPM | BODY MASS INDEX: 32.08 KG/M2 | WEIGHT: 211 LBS

## 2025-01-15 DIAGNOSIS — F43.20 ADJUSTMENT DISORDER, UNSPECIFIED TYPE: ICD-10-CM

## 2025-01-15 DIAGNOSIS — E78.2 MIXED HYPERLIPIDEMIA: ICD-10-CM

## 2025-01-15 DIAGNOSIS — M25.562 CHRONIC PAIN OF LEFT KNEE: ICD-10-CM

## 2025-01-15 DIAGNOSIS — G89.29 CHRONIC PAIN OF LEFT KNEE: ICD-10-CM

## 2025-01-15 DIAGNOSIS — R74.8 ELEVATED LIVER ENZYMES: ICD-10-CM

## 2025-01-15 DIAGNOSIS — I25.2 HX OF NON-ST ELEVATION MYOCARDIAL INFARCTION (NSTEMI): ICD-10-CM

## 2025-01-15 DIAGNOSIS — M79.7 FIBROMYALGIA: ICD-10-CM

## 2025-01-15 DIAGNOSIS — K21.9 GASTROESOPHAGEAL REFLUX DISEASE, UNSPECIFIED WHETHER ESOPHAGITIS PRESENT: ICD-10-CM

## 2025-01-15 DIAGNOSIS — R29.898 MUSCULAR DECONDITIONING: ICD-10-CM

## 2025-01-15 DIAGNOSIS — I25.10 CORONARY ARTERY DISEASE INVOLVING NATIVE HEART, UNSPECIFIED VESSEL OR LESION TYPE, UNSPECIFIED WHETHER ANGINA PRESENT: ICD-10-CM

## 2025-01-15 DIAGNOSIS — I10 ESSENTIAL HYPERTENSION: ICD-10-CM

## 2025-01-15 DIAGNOSIS — I10 ESSENTIAL HYPERTENSION: Primary | ICD-10-CM

## 2025-01-15 DIAGNOSIS — Z00.00 PREVENTATIVE HEALTH CARE: ICD-10-CM

## 2025-01-15 DIAGNOSIS — G62.0 PERIPHERAL NEUROPATHY DUE TO CHEMOTHERAPY  (CMD): ICD-10-CM

## 2025-01-15 DIAGNOSIS — T45.1X5A PERIPHERAL NEUROPATHY DUE TO CHEMOTHERAPY  (CMD): ICD-10-CM

## 2025-01-15 LAB
ALBUMIN SERPL-MCNC: 4 G/DL (ref 3.4–5)
ALBUMIN/GLOB SERPL: 1.2 {RATIO} (ref 1–2.4)
ALP SERPL-CCNC: 182 UNITS/L (ref 45–117)
ALT SERPL-CCNC: 78 UNITS/L
ANION GAP SERPL CALC-SCNC: 12 MMOL/L (ref 7–19)
AST SERPL-CCNC: 35 UNITS/L
BASOPHILS # BLD: 0.1 K/MCL (ref 0–0.3)
BASOPHILS NFR BLD: 1 %
BILIRUB SERPL-MCNC: 0.5 MG/DL (ref 0.2–1)
BUN SERPL-MCNC: 20 MG/DL (ref 6–20)
BUN/CREAT SERPL: 14 (ref 7–25)
CALCIUM SERPL-MCNC: 9.1 MG/DL (ref 8.4–10.2)
CHLORIDE SERPL-SCNC: 101 MMOL/L (ref 97–110)
CHOLEST SERPL-MCNC: 199 MG/DL
CHOLEST/HDLC SERPL: 6.2 {RATIO}
CO2 SERPL-SCNC: 29 MMOL/L (ref 21–32)
CREAT SERPL-MCNC: 1.39 MG/DL (ref 0.67–1.17)
DEPRECATED RDW RBC: 43.5 FL (ref 39–50)
EGFRCR SERPLBLD CKD-EPI 2021: 62 ML/MIN/{1.73_M2}
EOSINOPHIL # BLD: 0.2 K/MCL (ref 0–0.5)
EOSINOPHIL NFR BLD: 3 %
ERYTHROCYTE [DISTWIDTH] IN BLOOD: 12.8 % (ref 11–15)
FASTING DURATION TIME PATIENT: 10 HOURS (ref 0–999)
GLOBULIN SER-MCNC: 3.3 G/DL (ref 2–4)
GLUCOSE SERPL-MCNC: 104 MG/DL (ref 70–99)
HBA1C MFR BLD: 5.9 % (ref 4.5–5.6)
HCT VFR BLD CALC: 44.3 % (ref 39–51)
HDLC SERPL-MCNC: 32 MG/DL
HGB BLD-MCNC: 15.2 G/DL (ref 13–17)
IMM GRANULOCYTES # BLD AUTO: 0 K/MCL (ref 0–0.2)
IMM GRANULOCYTES # BLD: 0 %
LDLC SERPL CALC-MCNC: ABNORMAL MG/DL
LDLC SERPL DIRECT ASSAY-MCNC: 52 MG/DL
LYMPHOCYTES # BLD: 1.4 K/MCL (ref 1–4.8)
LYMPHOCYTES NFR BLD: 18 %
MCH RBC QN AUTO: 31.6 PG (ref 26–34)
MCHC RBC AUTO-ENTMCNC: 34.3 G/DL (ref 32–36.5)
MCV RBC AUTO: 92.1 FL (ref 78–100)
MONOCYTES # BLD: 0.5 K/MCL (ref 0.3–0.9)
MONOCYTES NFR BLD: 7 %
NEUTROPHILS # BLD: 5.3 K/MCL (ref 1.8–7.7)
NEUTROPHILS NFR BLD: 71 %
NONHDLC SERPL-MCNC: 167 MG/DL
NRBC BLD MANUAL-RTO: 0 /100 WBC
PLATELET # BLD AUTO: 183 K/MCL (ref 140–450)
POTASSIUM SERPL-SCNC: 4.3 MMOL/L (ref 3.4–5.1)
PROT SERPL-MCNC: 7.3 G/DL (ref 6.4–8.2)
RBC # BLD: 4.81 MIL/MCL (ref 4.5–5.9)
SODIUM SERPL-SCNC: 138 MMOL/L (ref 135–145)
T4 FREE SERPL-MCNC: 0.9 NG/DL (ref 0.8–1.5)
TRIGL SERPL-MCNC: 739 MG/DL
TSH SERPL-ACNC: 5.1 MCUNITS/ML (ref 0.35–5)
WBC # BLD: 7.5 K/MCL (ref 4.2–11)

## 2025-01-15 PROCEDURE — 36415 COLL VENOUS BLD VENIPUNCTURE: CPT

## 2025-01-15 RX ORDER — AMOXICILLIN 500 MG/1
CAPSULE ORAL
COMMUNITY
Start: 2024-12-30

## 2025-01-15 ASSESSMENT — ENCOUNTER SYMPTOMS
RHINORRHEA: 0
VOMITING: 0
UNEXPECTED WEIGHT CHANGE: 0
DIZZINESS: 0
LIGHT-HEADEDNESS: 0
SHORTNESS OF BREATH: 0
CHILLS: 0
DIARRHEA: 0
FATIGUE: 0
FEVER: 0
PSYCHIATRIC NEGATIVE: 1
CONSTIPATION: 0
NAUSEA: 0
ABDOMINAL PAIN: 0

## 2025-01-15 ASSESSMENT — PATIENT HEALTH QUESTIONNAIRE - PHQ9
2. FEELING DOWN, DEPRESSED OR HOPELESS: SEVERAL DAYS
SUM OF ALL RESPONSES TO PHQ9 QUESTIONS 1 AND 2: 2
SUM OF ALL RESPONSES TO PHQ9 QUESTIONS 1 AND 2: 2
1. LITTLE INTEREST OR PLEASURE IN DOING THINGS: SEVERAL DAYS
CLINICAL INTERPRETATION OF PHQ2 SCORE: NO FURTHER SCREENING NEEDED

## 2025-01-17 ENCOUNTER — TELEPHONE (OUTPATIENT)
Dept: INTERNAL MEDICINE | Age: 50
End: 2025-01-17

## 2025-01-17 DIAGNOSIS — E03.9 HYPOTHYROIDISM, UNSPECIFIED TYPE: Primary | ICD-10-CM

## 2025-01-17 DIAGNOSIS — E78.00 PURE HYPERCHOLESTEROLEMIA: ICD-10-CM

## 2025-01-17 DIAGNOSIS — N28.9 FUNCTION KIDNEY DECREASED: ICD-10-CM

## 2025-01-17 RX ORDER — ICOSAPENT ETHYL 1 G/1
2 CAPSULE ORAL 2 TIMES DAILY
Qty: 360 CAPSULE | Refills: 3 | Status: SHIPPED | OUTPATIENT
Start: 2025-01-17

## 2025-01-17 RX ORDER — LEVOTHYROXINE SODIUM 50 UG/1
50 TABLET ORAL DAILY
Qty: 90 TABLET | Refills: 0 | Status: SHIPPED | OUTPATIENT
Start: 2025-01-17

## 2025-01-24 ENCOUNTER — TELEPHONE (OUTPATIENT)
Dept: INTERNAL MEDICINE | Age: 50
End: 2025-01-24

## 2025-01-29 DIAGNOSIS — T45.1X5A PERIPHERAL NEUROPATHY DUE TO CHEMOTHERAPY  (CMD): ICD-10-CM

## 2025-01-29 DIAGNOSIS — M54.50 CHRONIC BILATERAL LOW BACK PAIN WITHOUT SCIATICA: ICD-10-CM

## 2025-01-29 DIAGNOSIS — G89.29 CHRONIC BILATERAL LOW BACK PAIN WITHOUT SCIATICA: ICD-10-CM

## 2025-01-29 DIAGNOSIS — G62.0 PERIPHERAL NEUROPATHY DUE TO CHEMOTHERAPY  (CMD): ICD-10-CM

## 2025-01-29 DIAGNOSIS — M79.10 MYALGIA: ICD-10-CM

## 2025-01-29 DIAGNOSIS — M79.7 FIBROMYALGIA: ICD-10-CM

## 2025-01-29 RX ORDER — PREGABALIN 75 MG/1
75 CAPSULE ORAL 2 TIMES DAILY
Qty: 180 CAPSULE | Refills: 3 | Status: CANCELLED | OUTPATIENT
Start: 2025-01-29

## 2025-01-31 ENCOUNTER — APPOINTMENT (OUTPATIENT)
Dept: FAMILY MEDICINE | Age: 50
End: 2025-01-31

## 2025-02-05 ENCOUNTER — APPOINTMENT (OUTPATIENT)
Dept: NUTRITION | Age: 50
End: 2025-02-05

## 2025-02-05 DIAGNOSIS — E78.00 PURE HYPERCHOLESTEROLEMIA: Primary | ICD-10-CM

## 2025-02-07 ENCOUNTER — E-ADVICE (OUTPATIENT)
Dept: NUTRITION | Age: 50
End: 2025-02-07

## 2025-02-20 ENCOUNTER — TELEPHONE (OUTPATIENT)
Dept: FAMILY MEDICINE | Age: 50
End: 2025-02-20

## 2025-02-20 DIAGNOSIS — G89.29 CHRONIC PAIN OF LEFT KNEE: ICD-10-CM

## 2025-02-20 DIAGNOSIS — T45.1X5A PERIPHERAL NEUROPATHY DUE TO CHEMOTHERAPY  (CMD): Primary | ICD-10-CM

## 2025-02-20 DIAGNOSIS — G62.0 PERIPHERAL NEUROPATHY DUE TO CHEMOTHERAPY  (CMD): Primary | ICD-10-CM

## 2025-02-20 DIAGNOSIS — M25.562 CHRONIC PAIN OF LEFT KNEE: ICD-10-CM

## 2025-02-20 DIAGNOSIS — R26.89 BALANCE PROBLEM: ICD-10-CM

## 2025-02-20 DIAGNOSIS — R26.2 DIFFICULTY WALKING: ICD-10-CM

## 2025-02-25 ENCOUNTER — TELEPHONE (OUTPATIENT)
Dept: INTERNAL MEDICINE | Age: 50
End: 2025-02-25

## 2025-02-25 DIAGNOSIS — R26.89 BALANCE PROBLEM: Primary | ICD-10-CM

## 2025-02-25 DIAGNOSIS — R26.2 DIFFICULTY WALKING: ICD-10-CM

## 2025-02-25 DIAGNOSIS — T45.1X5A PERIPHERAL NEUROPATHY DUE TO CHEMOTHERAPY  (CMD): ICD-10-CM

## 2025-02-25 DIAGNOSIS — G62.0 PERIPHERAL NEUROPATHY DUE TO CHEMOTHERAPY  (CMD): ICD-10-CM

## 2025-03-04 RX ORDER — ISOSORBIDE MONONITRATE 30 MG/1
30 TABLET, EXTENDED RELEASE ORAL DAILY
Qty: 90 TABLET | Refills: 1 | Status: SHIPPED | OUTPATIENT
Start: 2025-03-04

## 2025-03-04 RX ORDER — CLONIDINE HYDROCHLORIDE 0.1 MG/1
0.1 TABLET ORAL 2 TIMES DAILY
Qty: 60 TABLET | Refills: 5 | OUTPATIENT
Start: 2025-03-04

## 2025-03-10 RX ORDER — CLOPIDOGREL BISULFATE 75 MG/1
75 TABLET ORAL DAILY
Qty: 90 TABLET | Refills: 1 | Status: SHIPPED | OUTPATIENT
Start: 2025-03-10

## 2025-03-30 ENCOUNTER — TELEPHONE (OUTPATIENT)
Dept: GASTROENTEROLOGY | Age: 50
End: 2025-03-30

## 2025-03-31 RX ORDER — PANTOPRAZOLE SODIUM 40 MG/1
40 TABLET, DELAYED RELEASE ORAL 2 TIMES DAILY
Qty: 180 TABLET | Refills: 0 | OUTPATIENT
Start: 2025-03-31

## 2025-04-04 ENCOUNTER — TELEPHONE (OUTPATIENT)
Dept: BEHAVIORAL HEALTH | Age: 50
End: 2025-04-04

## 2025-04-04 ENCOUNTER — TELEPHONE (OUTPATIENT)
Dept: INTERNAL MEDICINE | Age: 50
End: 2025-04-04

## 2025-04-04 DIAGNOSIS — E03.9 HYPOTHYROIDISM, UNSPECIFIED TYPE: ICD-10-CM

## 2025-04-04 RX ORDER — LEVOTHYROXINE SODIUM 50 UG/1
50 TABLET ORAL DAILY
Qty: 90 TABLET | Refills: 0 | Status: SHIPPED | OUTPATIENT
Start: 2025-04-04

## 2025-04-11 ENCOUNTER — APPOINTMENT (OUTPATIENT)
Age: 50
End: 2025-04-11

## 2025-04-11 DIAGNOSIS — F51.01 PRIMARY INSOMNIA: ICD-10-CM

## 2025-04-11 DIAGNOSIS — F32.A DEPRESSION, UNSPECIFIED DEPRESSION TYPE: Primary | ICD-10-CM

## 2025-04-11 RX ORDER — AMITRIPTYLINE HYDROCHLORIDE 100 MG/1
100 TABLET ORAL NIGHTLY
Qty: 90 TABLET | Refills: 0 | Status: SHIPPED | OUTPATIENT
Start: 2025-04-11

## 2025-04-11 RX ORDER — OLANZAPINE 5 MG/1
5 TABLET ORAL NIGHTLY
Qty: 90 TABLET | Refills: 0 | Status: SHIPPED | OUTPATIENT
Start: 2025-04-11

## 2025-04-14 RX ORDER — PANTOPRAZOLE SODIUM 40 MG/1
40 TABLET, DELAYED RELEASE ORAL 2 TIMES DAILY
Qty: 180 TABLET | Refills: 0 | Status: SHIPPED | OUTPATIENT
Start: 2025-04-14

## 2025-04-15 RX ORDER — IBUPROFEN 800 MG/1
800 TABLET, FILM COATED ORAL EVERY 8 HOURS PRN
Qty: 30 TABLET | Refills: 2 | Status: SHIPPED | OUTPATIENT
Start: 2025-04-15

## 2025-04-18 ENCOUNTER — TELEPHONE (OUTPATIENT)
Age: 50
End: 2025-04-18

## 2025-04-18 ENCOUNTER — APPOINTMENT (OUTPATIENT)
Age: 50
End: 2025-04-18

## 2025-05-05 RX ORDER — IBUPROFEN 800 MG/1
800 TABLET, FILM COATED ORAL EVERY 8 HOURS PRN
Qty: 30 TABLET | Refills: 5 | Status: SHIPPED | OUTPATIENT
Start: 2025-05-05

## 2025-05-07 ENCOUNTER — E-ADVICE (OUTPATIENT)
Dept: FAMILY MEDICINE | Age: 50
End: 2025-05-07

## 2025-05-07 ENCOUNTER — APPOINTMENT (OUTPATIENT)
Dept: FAMILY MEDICINE | Age: 50
End: 2025-05-07

## 2025-05-14 ENCOUNTER — E-ADVICE (OUTPATIENT)
Dept: NUTRITION | Age: 50
End: 2025-05-14

## 2025-05-14 ENCOUNTER — APPOINTMENT (OUTPATIENT)
Dept: NUTRITION | Age: 50
End: 2025-05-14

## 2025-05-14 DIAGNOSIS — E78.00 PURE HYPERCHOLESTEROLEMIA: Primary | ICD-10-CM

## 2025-05-28 RX ORDER — IBUPROFEN 800 MG/1
800 TABLET, FILM COATED ORAL EVERY 8 HOURS PRN
Qty: 30 TABLET | Refills: 0 | Status: SHIPPED | OUTPATIENT
Start: 2025-05-28

## 2025-05-29 RX ORDER — ASPIRIN 81 MG/1
81 TABLET, COATED ORAL DAILY
Qty: 90 TABLET | Refills: 3 | Status: SHIPPED | OUTPATIENT
Start: 2025-05-29

## 2025-05-30 ENCOUNTER — TELEPHONE (OUTPATIENT)
Dept: INTERNAL MEDICINE | Age: 50
End: 2025-05-30

## 2025-05-30 RX ORDER — METOPROLOL TARTRATE 25 MG/1
TABLET, FILM COATED ORAL
Qty: 180 TABLET | Refills: 1 | OUTPATIENT
Start: 2025-05-30

## 2025-05-30 RX ORDER — ATORVASTATIN CALCIUM 20 MG/1
20 TABLET, FILM COATED ORAL DAILY
Qty: 90 TABLET | Refills: 3 | OUTPATIENT
Start: 2025-05-30

## 2025-05-30 RX ORDER — LEVOTHYROXINE SODIUM 50 UG/1
50 TABLET ORAL DAILY
Qty: 90 TABLET | Refills: 0 | OUTPATIENT
Start: 2025-05-30

## 2025-05-30 RX ORDER — ISOSORBIDE MONONITRATE 30 MG/1
30 TABLET, EXTENDED RELEASE ORAL DAILY
Qty: 90 TABLET | Refills: 1 | OUTPATIENT
Start: 2025-05-30

## 2025-05-30 RX ORDER — CLOPIDOGREL BISULFATE 75 MG/1
75 TABLET ORAL DAILY
Qty: 90 TABLET | Refills: 1 | OUTPATIENT
Start: 2025-05-30

## 2025-05-30 SDOH — ECONOMIC STABILITY: HOUSING INSECURITY: WHAT IS YOUR LIVING SITUATION TODAY?: I HAVE A STEADY PLACE TO LIVE

## 2025-05-30 SDOH — ECONOMIC STABILITY: HOUSING INSECURITY: DO YOU HAVE PROBLEMS WITH ANY OF THE FOLLOWING?: NONE OF THE ABOVE

## 2025-05-30 SDOH — ECONOMIC STABILITY: FOOD INSECURITY: WITHIN THE PAST 12 MONTHS, THE FOOD YOU BOUGHT JUST DIDN'T LAST AND YOU DIDN'T HAVE MONEY TO GET MORE.: SOMETIMES TRUE

## 2025-05-30 SDOH — ECONOMIC STABILITY: FOOD INSECURITY: WITHIN THE PAST 12 MONTHS, THE FOOD YOU BOUGHT JUST DIDN'T LAST AND YOU DIDN'T HAVE MONEY TO GET MORE.: OFTEN TRUE

## 2025-05-30 ASSESSMENT — ANXIETY QUESTIONNAIRES
6. BECOMING EASILY ANNOYED OR IRRITABLE: MORE THAN HALF THE DAYS
3. WORRYING TOO MUCH ABOUT DIFFERENT THINGS: 2 - MORE THAN HALF THE DAYS
GAD7 TOTAL SCORE: 7
7. FEELING AFRAID AS IF SOMETHING AWFUL MIGHT HAPPEN: 0 - NOT AT ALL
1. FEELING NERVOUS, ANXIOUS, OR ON EDGE: MORE THAN HALF THE DAYS
GAD7 TOTAL SCORE: 7
6. BECOMING EASILY ANNOYED OR IRRITABLE: 2 - MORE THAN HALF THE DAYS
5. BEING SO RESTLESS THAT IT IS HARD TO SIT STILL: 0 - NOT AT ALL
3. WORRYING TOO MUCH ABOUT DIFFERENT THINGS: MORE THAN HALF THE DAYS
IF YOU CHECKED OFF ANY PROBLEMS ON THIS QUESTIONNAIRE, HOW DIFFICULT HAVE THESE PROBLEMS MADE IT FOR YOU TO DO YOUR WORK, TAKE CARE OF THINGS AT HOME, OR GET ALONG WITH OTHER PEOPLE: SOMEWHAT DIFFICULT
4. TROUBLE RELAXING: 0 - NOT AT ALL
IF YOU CHECKED OFF ANY PROBLEMS ON THIS QUESTIONNAIRE, HOW DIFFICULT HAVE THESE PROBLEMS MADE IT FOR YOU TO DO YOUR WORK, TAKE CARE OF THINGS AT HOME, OR GET ALONG WITH OTHER PEOPLE: SOMEWHAT DIFFICULT
1. FEELING NERVOUS, ANXIOUS, OR ON EDGE: 2 - MORE THAN HALF THE DAYS
2. NOT BEING ABLE TO STOP OR CONTROL WORRYING: 1 - SEVERAL DAYS
4. TROUBLE RELAXING: NOT AT ALL
2. NOT BEING ABLE TO STOP OR CONTROL WORRYING: SEVERAL DAYS
5. BEING SO RESTLESS THAT IT IS HARD TO SIT STILL: NOT AT ALL
7. FEELING AFRAID AS IF SOMETHING AWFUL MIGHT HAPPEN: NOT AT ALL

## 2025-05-30 ASSESSMENT — PATIENT HEALTH QUESTIONNAIRE - PHQ9
CLINICAL INTERPRETATION OF PHQ2 SCORE: 3
SUM OF ALL RESPONSES TO PHQ QUESTIONS 3-9. PHQ2 SCORE WAS 3: 8
SUM OF ALL RESPONSES TO PHQ QUESTIONS 1-9: 8
CLINICAL INTERPRETATION OF PHQ2 SCORE: FURTHER SCREENING NEEDED
SUM OF ALL RESPONSES TO PHQ9 QUESTIONS 1 AND 2: 3
5. POOR APPETITE OR OVEREATING: SEVERAL DAYS
CLINICAL INTERPRETATION OF PHQ9 SCORE: MILD DEPRESSION
2. FEELING DOWN, DEPRESSED OR HOPELESS: NEARLY EVERY DAY
CLINICAL INTERPRETATION OF PHQ9 SCORE: MILD DEPRESSION
1. LITTLE INTEREST OR PLEASURE IN DOING THINGS: MORE THAN HALF THE DAYS
SUM OF ALL RESPONSES TO PHQ QUESTIONS 1-9: 7
5. POOR APPETITE OR OVEREATING: NOT AT ALL
1. LITTLE INTEREST OR PLEASURE IN DOING THINGS: NOT AT ALL
SUM OF ALL RESPONSES TO PHQ QUESTIONS 1-9: 8
7. TROUBLE CONCENTRATING ON THINGS, SUCH AS READING THE NEWSPAPER OR WATCHING TELEVISION: SEVERAL DAYS
9. THOUGHTS THAT YOU WOULD BE BETTER OFF DEAD, OR OF HURTING YOURSELF: SEVERAL DAYS
CLINICAL INTERPRETATION OF PHQ2 SCORE: 3
9. THOUGHTS THAT YOU WOULD BE BETTER OFF DEAD, OR OF HURTING YOURSELF: NOT AT ALL
SUM OF ALL RESPONSES TO PHQ QUESTIONS 3-9. PHQ2 SCORE WAS 3: 7
2. FEELING DOWN, DEPRESSED OR HOPELESS: SEVERAL DAYS
CLINICAL INTERPRETATION OF PHQ9 SCORE: MILD DEPRESSION
7. TROUBLE CONCENTRATING ON THINGS, SUCH AS READING THE NEWSPAPER OR WATCHING TELEVISION: SEVERAL DAYS
8. MOVING OR SPEAKING SO SLOWLY THAT OTHER PEOPLE COULD HAVE NOTICED. OR THE OPPOSITE, BEING SO FIGETY OR RESTLESS THAT YOU HAVE BEEN MOVING AROUND A LOT MORE THAN USUAL: NOT AT ALL
CLINICAL INTERPRETATION OF PHQ2 SCORE: FURTHER SCREENING NEEDED
3. TROUBLE FALLING OR STAYING ASLEEP OR SLEEPING TOO MUCH: SEVERAL DAYS
4. FEELING TIRED OR HAVING LITTLE ENERGY: NOT AT ALL
3. TROUBLE FALLING OR STAYING ASLEEP OR SLEEPING TOO MUCH: MORE THAN HALF THE DAYS
SUM OF ALL RESPONSES TO PHQ9 QUESTIONS 1 AND 2: 3
4. FEELING TIRED OR HAVING LITTLE ENERGY: NOT AT ALL
SUM OF ALL RESPONSES TO PHQ9 QUESTIONS 1 AND 2: 3
6. FEELING BAD ABOUT YOURSELF - OR THAT YOU ARE A FAILURE OR HAVE LET YOURSELF OR YOUR FAMILY DOWN: SEVERAL DAYS
8. MOVING OR SPEAKING SO SLOWLY THAT OTHER PEOPLE COULD HAVE NOTICED. OR THE OPPOSITE, BEING SO FIGETY OR RESTLESS THAT YOU HAVE BEEN MOVING AROUND A LOT MORE THAN USUAL: NOT AT ALL
6. FEELING BAD ABOUT YOURSELF - OR THAT YOU ARE A FAILURE OR HAVE LET YOURSELF OR YOUR FAMILY DOWN: SEVERAL DAYS
CLINICAL INTERPRETATION OF PHQ2 SCORE: FURTHER SCREENING NEEDED

## 2025-05-30 ASSESSMENT — SOCIAL DETERMINANTS OF HEALTH (SDOH)
IN THE PAST 12 MONTHS, HAS THE ELECTRIC, GAS, OIL, OR WATER COMPANY THREATENED TO SHUT OFF SERVICE IN YOUR HOME?: NO
IN THE PAST 12 MONTHS, HAS THE ELECTRIC, GAS, OIL, OR WATER COMPANY THREATENED TO SHUT OFF SERVICE IN YOUR HOME?: NO

## 2025-06-02 ENCOUNTER — APPOINTMENT (OUTPATIENT)
Age: 50
End: 2025-06-02

## 2025-06-02 DIAGNOSIS — F32.A DEPRESSION, UNSPECIFIED DEPRESSION TYPE: ICD-10-CM

## 2025-06-02 DIAGNOSIS — F51.01 PRIMARY INSOMNIA: ICD-10-CM

## 2025-06-02 DIAGNOSIS — F32.A DEPRESSION, UNSPECIFIED DEPRESSION TYPE: Primary | ICD-10-CM

## 2025-06-02 PROCEDURE — 99214 OFFICE O/P EST MOD 30 MIN: CPT | Performed by: PSYCHIATRY & NEUROLOGY

## 2025-06-02 RX ORDER — OLANZAPINE 5 MG/1
5 TABLET, FILM COATED ORAL NIGHTLY
Qty: 30 TABLET | Refills: 0 | Status: SHIPPED | OUTPATIENT
Start: 2025-06-02

## 2025-06-02 RX ORDER — AMITRIPTYLINE HYDROCHLORIDE 100 MG/1
100 TABLET ORAL NIGHTLY
Qty: 30 TABLET | Refills: 0 | Status: SHIPPED | OUTPATIENT
Start: 2025-06-02

## 2025-06-02 RX ORDER — AMITRIPTYLINE HYDROCHLORIDE 100 MG/1
100 TABLET ORAL NIGHTLY
Qty: 90 TABLET | Refills: 0 | Status: CANCELLED | OUTPATIENT
Start: 2025-06-02

## 2025-06-04 DIAGNOSIS — G89.29 CHRONIC BILATERAL LOW BACK PAIN WITHOUT SCIATICA: ICD-10-CM

## 2025-06-04 DIAGNOSIS — M54.50 CHRONIC BILATERAL LOW BACK PAIN WITHOUT SCIATICA: ICD-10-CM

## 2025-06-04 DIAGNOSIS — T45.1X5A PERIPHERAL NEUROPATHY DUE TO CHEMOTHERAPY  (CMD): ICD-10-CM

## 2025-06-04 DIAGNOSIS — M79.7 FIBROMYALGIA: ICD-10-CM

## 2025-06-04 DIAGNOSIS — G62.0 PERIPHERAL NEUROPATHY DUE TO CHEMOTHERAPY  (CMD): ICD-10-CM

## 2025-06-04 DIAGNOSIS — M79.10 MYALGIA: ICD-10-CM

## 2025-06-06 ENCOUNTER — APPOINTMENT (OUTPATIENT)
Age: 50
End: 2025-06-06

## 2025-06-06 ENCOUNTER — LAB SERVICES (OUTPATIENT)
Dept: LAB | Age: 50
End: 2025-06-06

## 2025-06-06 ENCOUNTER — APPOINTMENT (OUTPATIENT)
Dept: INTERNAL MEDICINE | Age: 50
End: 2025-06-06

## 2025-06-06 VITALS
OXYGEN SATURATION: 96 % | WEIGHT: 214.1 LBS | TEMPERATURE: 98.9 F | SYSTOLIC BLOOD PRESSURE: 114 MMHG | HEIGHT: 68 IN | BODY MASS INDEX: 32.45 KG/M2 | HEART RATE: 93 BPM | DIASTOLIC BLOOD PRESSURE: 88 MMHG

## 2025-06-06 DIAGNOSIS — I10 ESSENTIAL HYPERTENSION: ICD-10-CM

## 2025-06-06 DIAGNOSIS — E78.00 PURE HYPERCHOLESTEROLEMIA: ICD-10-CM

## 2025-06-06 DIAGNOSIS — E03.9 HYPOTHYROIDISM, UNSPECIFIED TYPE: ICD-10-CM

## 2025-06-06 DIAGNOSIS — R20.2 NUMBNESS AND TINGLING IN LEFT ARM: ICD-10-CM

## 2025-06-06 DIAGNOSIS — R73.01 IFG (IMPAIRED FASTING GLUCOSE): ICD-10-CM

## 2025-06-06 DIAGNOSIS — Z00.00 PREVENTATIVE HEALTH CARE: ICD-10-CM

## 2025-06-06 DIAGNOSIS — E78.2 MIXED HYPERLIPIDEMIA: ICD-10-CM

## 2025-06-06 DIAGNOSIS — F33.1 MODERATE EPISODE OF RECURRENT MAJOR DEPRESSIVE DISORDER (CMD): Primary | ICD-10-CM

## 2025-06-06 DIAGNOSIS — R20.0 NUMBNESS OF FEET: ICD-10-CM

## 2025-06-06 DIAGNOSIS — R20.0 NUMBNESS AND TINGLING IN LEFT ARM: ICD-10-CM

## 2025-06-06 DIAGNOSIS — R74.8 ELEVATED ALKALINE PHOSPHATASE LEVEL: ICD-10-CM

## 2025-06-06 DIAGNOSIS — H93.8X1 BLOOD CLOT OF RIGHT EAR: ICD-10-CM

## 2025-06-06 DIAGNOSIS — G56.02 CARPAL TUNNEL SYNDROME OF LEFT WRIST: ICD-10-CM

## 2025-06-06 DIAGNOSIS — Z23 NEED FOR PNEUMOCOCCAL VACCINATION: ICD-10-CM

## 2025-06-06 LAB
BASOPHILS # BLD: 0.1 K/MCL (ref 0–0.3)
BASOPHILS NFR BLD: 1 %
DEPRECATED RDW RBC: 46 FL (ref 39–50)
EOSINOPHIL # BLD: 0.3 K/MCL (ref 0–0.5)
EOSINOPHIL NFR BLD: 3 %
ERYTHROCYTE [DISTWIDTH] IN BLOOD: 13.7 % (ref 11–15)
FOLATE SERPL-MCNC: 16.3 NG/ML
HBA1C MFR BLD: 6 % (ref 4.5–5.6)
HCT VFR BLD CALC: 39.6 % (ref 39–51)
HGB BLD-MCNC: 14 G/DL (ref 13–17)
IMM GRANULOCYTES # BLD AUTO: 0 K/MCL (ref 0–0.2)
IMM GRANULOCYTES # BLD: 0 %
LYMPHOCYTES # BLD: 3.3 K/MCL (ref 1–4)
LYMPHOCYTES NFR BLD: 42 %
MAGNESIUM SERPL-MCNC: 2.1 MG/DL (ref 1.7–2.4)
MCH RBC QN AUTO: 32.4 PG (ref 26–34)
MCHC RBC AUTO-ENTMCNC: 35.4 G/DL (ref 32–36.5)
MCV RBC AUTO: 91.7 FL (ref 78–100)
MONOCYTES # BLD: 0.5 K/MCL (ref 0.3–0.9)
MONOCYTES NFR BLD: 7 %
NEUTROPHILS # BLD: 3.7 K/MCL (ref 1.8–7.7)
NEUTROPHILS NFR BLD: 47 %
NRBC BLD MANUAL-RTO: 0 /100 WBC
PLATELET # BLD AUTO: 231 K/MCL (ref 140–450)
RBC # BLD: 4.32 MIL/MCL (ref 4.5–5.9)
TSH SERPL-ACNC: 0.69 MCUNITS/ML (ref 0.35–5)
VIT B12 SERPL-MCNC: 368 PG/ML (ref 211–911)
WBC # BLD: 7.9 K/MCL (ref 4.2–11)

## 2025-06-06 PROCEDURE — 83036 HEMOGLOBIN GLYCOSYLATED A1C: CPT | Performed by: INTERNAL MEDICINE

## 2025-06-06 PROCEDURE — 83735 ASSAY OF MAGNESIUM: CPT | Performed by: INTERNAL MEDICINE

## 2025-06-06 RX ORDER — PANTOPRAZOLE SODIUM 40 MG/1
40 TABLET, DELAYED RELEASE ORAL 2 TIMES DAILY
Qty: 180 TABLET | Refills: 0 | Status: SHIPPED | OUTPATIENT
Start: 2025-06-06

## 2025-06-06 RX ORDER — PREGABALIN 75 MG/1
75 CAPSULE ORAL 2 TIMES DAILY
Qty: 180 CAPSULE | Refills: 3 | Status: SHIPPED | OUTPATIENT
Start: 2025-06-06 | End: 2025-06-06 | Stop reason: CLARIF

## 2025-06-06 RX ORDER — OFLOXACIN 3 MG/ML
5 SOLUTION AURICULAR (OTIC) DAILY
Qty: 5 ML | Refills: 0 | Status: SHIPPED | OUTPATIENT
Start: 2025-06-06

## 2025-06-06 RX ORDER — PREGABALIN 75 MG/1
75 CAPSULE ORAL 2 TIMES DAILY
Qty: 180 CAPSULE | OUTPATIENT
Start: 2025-06-06

## 2025-06-06 RX ORDER — ASPIRIN 81 MG/1
81 TABLET, COATED ORAL DAILY
Qty: 90 TABLET | Refills: 3 | OUTPATIENT
Start: 2025-06-06

## 2025-06-06 RX ORDER — PREGABALIN 75 MG/1
75 CAPSULE ORAL 2 TIMES DAILY
Qty: 180 CAPSULE | Refills: 3 | Status: SHIPPED | OUTPATIENT
Start: 2025-06-06

## 2025-06-06 RX ORDER — METHYLPREDNISOLONE 4 MG/1
TABLET ORAL
Qty: 21 TABLET | Refills: 0 | Status: SHIPPED | OUTPATIENT
Start: 2025-06-06

## 2025-06-06 ASSESSMENT — PATIENT HEALTH QUESTIONNAIRE - PHQ9
SUM OF ALL RESPONSES TO PHQ9 QUESTIONS 1 AND 2: 0
SUM OF ALL RESPONSES TO PHQ9 QUESTIONS 1 AND 2: 0
1. LITTLE INTEREST OR PLEASURE IN DOING THINGS: NOT AT ALL
2. FEELING DOWN, DEPRESSED OR HOPELESS: NOT AT ALL
CLINICAL INTERPRETATION OF PHQ2 SCORE: NO FURTHER SCREENING NEEDED

## 2025-06-07 DIAGNOSIS — E03.9 HYPOTHYROIDISM, UNSPECIFIED TYPE: ICD-10-CM

## 2025-06-07 LAB
ALBUMIN SERPL-MCNC: 3.8 G/DL (ref 3.4–5)
ALBUMIN/GLOB SERPL: 1.2 {RATIO} (ref 1–2.4)
ALP SERPL-CCNC: 170 UNITS/L (ref 45–117)
ALT SERPL-CCNC: 73 UNITS/L
ANION GAP SERPL CALC-SCNC: 10 MMOL/L (ref 7–19)
AST SERPL-CCNC: 41 UNITS/L
BILIRUB SERPL-MCNC: 0.4 MG/DL (ref 0.2–1)
BUN SERPL-MCNC: 13 MG/DL (ref 6–20)
BUN/CREAT SERPL: 11 (ref 7–25)
CALCIUM SERPL-MCNC: 8.6 MG/DL (ref 8.4–10.2)
CHLORIDE SERPL-SCNC: 108 MMOL/L (ref 97–110)
CO2 SERPL-SCNC: 25 MMOL/L (ref 21–32)
CREAT SERPL-MCNC: 1.19 MG/DL (ref 0.67–1.17)
EGFRCR SERPLBLD CKD-EPI 2021: 74 ML/MIN/{1.73_M2}
FASTING DURATION TIME PATIENT: 0 HOURS (ref 0–999)
GLOBULIN SER-MCNC: 3.2 G/DL (ref 2–4)
GLUCOSE SERPL-MCNC: 122 MG/DL (ref 70–99)
POTASSIUM SERPL-SCNC: 4.1 MMOL/L (ref 3.4–5.1)
PROT SERPL-MCNC: 7 G/DL (ref 6.4–8.2)
SODIUM SERPL-SCNC: 139 MMOL/L (ref 135–145)

## 2025-06-09 LAB
ALP BONE SERPL-CCNC: 48 U/L (ref 0–55)
ALP LIVER SERPL-CCNC: 125 U/L (ref 0–94)
ALP OTHER SERPL-CCNC: 0 U/L
ALP SERPL-CCNC: 173 U/L (ref 40–120)

## 2025-06-09 RX ORDER — LEVOTHYROXINE SODIUM 50 UG/1
50 TABLET ORAL DAILY
Qty: 90 TABLET | Refills: 0 | Status: SHIPPED | OUTPATIENT
Start: 2025-06-09

## 2025-06-10 ENCOUNTER — RESULTS FOLLOW-UP (OUTPATIENT)
Dept: INTERNAL MEDICINE | Age: 50
End: 2025-06-10

## 2025-06-10 DIAGNOSIS — R74.8 ABNORMAL LIVER ENZYMES: Primary | ICD-10-CM

## 2025-06-10 DIAGNOSIS — K76.0 FATTY LIVER: ICD-10-CM

## 2025-06-11 ENCOUNTER — APPOINTMENT (OUTPATIENT)
Dept: NUTRITION | Age: 50
End: 2025-06-11

## 2025-06-11 DIAGNOSIS — R73.01 IFG (IMPAIRED FASTING GLUCOSE): Primary | ICD-10-CM

## 2025-06-12 ENCOUNTER — APPOINTMENT (OUTPATIENT)
Dept: SPORTS MEDICINE | Age: 50
End: 2025-06-12
Attending: INTERNAL MEDICINE

## 2025-06-12 DIAGNOSIS — M25.532 LEFT WRIST PAIN: Primary | ICD-10-CM

## 2025-06-16 RX ORDER — METOPROLOL TARTRATE 25 MG/1
TABLET, FILM COATED ORAL
Qty: 180 TABLET | Refills: 1 | Status: SHIPPED | OUTPATIENT
Start: 2025-06-16

## 2025-06-16 RX ORDER — CLOPIDOGREL BISULFATE 75 MG/1
75 TABLET ORAL DAILY
Qty: 90 TABLET | Refills: 0 | Status: SHIPPED | OUTPATIENT
Start: 2025-06-16

## 2025-06-16 RX ORDER — ATORVASTATIN CALCIUM 20 MG/1
20 TABLET, FILM COATED ORAL DAILY
Qty: 90 TABLET | Refills: 1 | Status: SHIPPED | OUTPATIENT
Start: 2025-06-16

## 2025-06-18 ENCOUNTER — APPOINTMENT (OUTPATIENT)
Dept: ULTRASOUND IMAGING | Age: 50
End: 2025-06-18
Attending: INTERNAL MEDICINE

## 2025-06-18 ENCOUNTER — RESULTS FOLLOW-UP (OUTPATIENT)
Dept: INTERNAL MEDICINE | Age: 50
End: 2025-06-18

## 2025-06-18 DIAGNOSIS — K76.0 FATTY LIVER: ICD-10-CM

## 2025-06-18 DIAGNOSIS — R74.8 ABNORMAL LIVER ENZYMES: ICD-10-CM

## 2025-06-18 PROCEDURE — 76705 ECHO EXAM OF ABDOMEN: CPT | Performed by: RADIOLOGY

## 2025-06-19 ENCOUNTER — APPOINTMENT (OUTPATIENT)
Dept: SPORTS MEDICINE | Age: 50
End: 2025-06-19

## 2025-06-23 ENCOUNTER — TELEPHONE (OUTPATIENT)
Dept: INTERNAL MEDICINE | Age: 50
End: 2025-06-23

## 2025-06-26 ENCOUNTER — APPOINTMENT (OUTPATIENT)
Dept: INTERNAL MEDICINE | Age: 50
End: 2025-06-26

## 2025-07-02 ENCOUNTER — APPOINTMENT (OUTPATIENT)
Dept: NUTRITION | Age: 50
End: 2025-07-02

## 2025-07-03 ENCOUNTER — APPOINTMENT (OUTPATIENT)
Dept: SPORTS MEDICINE | Age: 50
End: 2025-07-03
Attending: INTERNAL MEDICINE

## 2025-07-08 DIAGNOSIS — F32.A DEPRESSION, UNSPECIFIED DEPRESSION TYPE: ICD-10-CM

## 2025-07-08 DIAGNOSIS — E78.00 PURE HYPERCHOLESTEROLEMIA: ICD-10-CM

## 2025-07-08 DIAGNOSIS — F51.01 PRIMARY INSOMNIA: ICD-10-CM

## 2025-07-09 DIAGNOSIS — F51.01 PRIMARY INSOMNIA: ICD-10-CM

## 2025-07-09 DIAGNOSIS — E03.9 HYPOTHYROIDISM, UNSPECIFIED TYPE: ICD-10-CM

## 2025-07-11 RX ORDER — PANTOPRAZOLE SODIUM 40 MG/1
40 TABLET, DELAYED RELEASE ORAL 2 TIMES DAILY
Qty: 180 TABLET | Refills: 0 | Status: SHIPPED | OUTPATIENT
Start: 2025-07-11

## 2025-07-11 RX ORDER — CLOPIDOGREL BISULFATE 75 MG/1
75 TABLET ORAL DAILY
Qty: 90 TABLET | Refills: 0 | Status: SHIPPED | OUTPATIENT
Start: 2025-07-11

## 2025-07-11 RX ORDER — PREGABALIN 75 MG/1
75 CAPSULE ORAL 2 TIMES DAILY
Qty: 180 CAPSULE | Refills: 3 | Status: SHIPPED | OUTPATIENT
Start: 2025-07-11

## 2025-07-11 RX ORDER — ATORVASTATIN CALCIUM 20 MG/1
20 TABLET, FILM COATED ORAL DAILY
Qty: 90 TABLET | Refills: 1 | Status: SHIPPED | OUTPATIENT
Start: 2025-07-11

## 2025-07-11 RX ORDER — ICOSAPENT ETHYL 1 G/1
2 CAPSULE ORAL 2 TIMES DAILY
Qty: 360 CAPSULE | Refills: 3 | Status: SHIPPED | OUTPATIENT
Start: 2025-07-11

## 2025-07-12 ENCOUNTER — APPOINTMENT (OUTPATIENT)
Dept: NUTRITION | Age: 50
End: 2025-07-12

## 2025-07-12 ENCOUNTER — E-ADVICE (OUTPATIENT)
Dept: NUTRITION | Age: 50
End: 2025-07-12

## 2025-07-12 DIAGNOSIS — E78.00 PURE HYPERCHOLESTEROLEMIA: Primary | ICD-10-CM

## 2025-07-14 RX ORDER — LEVOTHYROXINE SODIUM 50 UG/1
50 TABLET ORAL DAILY
Qty: 90 TABLET | Refills: 0 | Status: SHIPPED | OUTPATIENT
Start: 2025-07-14 | End: 2025-07-15 | Stop reason: SDUPTHER

## 2025-07-14 RX ORDER — OLANZAPINE 5 MG/1
5 TABLET, FILM COATED ORAL NIGHTLY
Qty: 30 TABLET | Refills: 0 | OUTPATIENT
Start: 2025-07-14

## 2025-07-14 RX ORDER — METOPROLOL TARTRATE 25 MG/1
TABLET, FILM COATED ORAL
Qty: 180 TABLET | Refills: 0 | Status: SHIPPED | OUTPATIENT
Start: 2025-07-14 | End: 2025-07-17 | Stop reason: SDUPTHER

## 2025-07-15 DIAGNOSIS — E03.9 HYPOTHYROIDISM, UNSPECIFIED TYPE: ICD-10-CM

## 2025-07-15 RX ORDER — LEVOTHYROXINE SODIUM 50 UG/1
50 TABLET ORAL DAILY
Qty: 90 TABLET | Refills: 0 | Status: SHIPPED | OUTPATIENT
Start: 2025-07-15 | End: 2025-07-17 | Stop reason: SDUPTHER

## 2025-07-16 ENCOUNTER — APPOINTMENT (OUTPATIENT)
Dept: SPORTS MEDICINE | Age: 50
End: 2025-07-16
Attending: INTERNAL MEDICINE

## 2025-07-16 DIAGNOSIS — F51.01 PRIMARY INSOMNIA: ICD-10-CM

## 2025-07-16 DIAGNOSIS — F32.A DEPRESSION, UNSPECIFIED DEPRESSION TYPE: ICD-10-CM

## 2025-07-16 RX ORDER — AMITRIPTYLINE HYDROCHLORIDE 100 MG/1
100 TABLET ORAL NIGHTLY
Qty: 30 TABLET | Refills: 0 | Status: CANCELLED | OUTPATIENT
Start: 2025-07-16

## 2025-07-16 RX ORDER — AMITRIPTYLINE HYDROCHLORIDE 100 MG/1
100 TABLET ORAL NIGHTLY
Qty: 30 TABLET | Refills: 0 | Status: SHIPPED | OUTPATIENT
Start: 2025-07-16

## 2025-07-16 RX ORDER — ISOSORBIDE MONONITRATE 30 MG/1
30 TABLET, EXTENDED RELEASE ORAL DAILY
Qty: 90 TABLET | Refills: 0 | Status: SHIPPED | OUTPATIENT
Start: 2025-07-16

## 2025-07-16 RX ORDER — CLOPIDOGREL BISULFATE 75 MG/1
75 TABLET ORAL DAILY
Qty: 90 TABLET | Refills: 0 | Status: SHIPPED | OUTPATIENT
Start: 2025-07-16

## 2025-07-16 RX ORDER — ATORVASTATIN CALCIUM 20 MG/1
20 TABLET, FILM COATED ORAL DAILY
Qty: 90 TABLET | Refills: 1 | Status: SHIPPED | OUTPATIENT
Start: 2025-07-16

## 2025-07-24 ENCOUNTER — APPOINTMENT (OUTPATIENT)
Dept: SPORTS MEDICINE | Age: 50
End: 2025-07-24
Attending: INTERNAL MEDICINE

## 2025-08-05 ENCOUNTER — APPOINTMENT (OUTPATIENT)
Dept: INTERNAL MEDICINE | Age: 50
End: 2025-08-05

## 2025-08-05 DIAGNOSIS — E66.09 CLASS 1 OBESITY DUE TO EXCESS CALORIES WITH SERIOUS COMORBIDITY AND BODY MASS INDEX (BMI) OF 32.0 TO 32.9 IN ADULT: ICD-10-CM

## 2025-08-05 DIAGNOSIS — E03.9 ACQUIRED HYPOTHYROIDISM: ICD-10-CM

## 2025-08-05 DIAGNOSIS — K76.0 FATTY LIVER: ICD-10-CM

## 2025-08-05 DIAGNOSIS — R20.2 PARESTHESIA OF LEFT ARM: Primary | ICD-10-CM

## 2025-08-05 DIAGNOSIS — M25.512 ACUTE PAIN OF LEFT SHOULDER: ICD-10-CM

## 2025-08-05 DIAGNOSIS — E66.811 CLASS 1 OBESITY DUE TO EXCESS CALORIES WITH SERIOUS COMORBIDITY AND BODY MASS INDEX (BMI) OF 32.0 TO 32.9 IN ADULT: ICD-10-CM

## 2025-08-05 DIAGNOSIS — I25.2 PERSONAL HISTORY OF MI (MYOCARDIAL INFARCTION): ICD-10-CM

## 2025-08-05 RX ORDER — OLANZAPINE 2.5 MG/1
2.5 TABLET, FILM COATED ORAL AT BEDTIME
COMMUNITY
Start: 2025-07-14

## 2025-08-05 RX ORDER — METHYLPREDNISOLONE 4 MG/1
TABLET ORAL
Qty: 21 TABLET | Refills: 0 | Status: SHIPPED | OUTPATIENT
Start: 2025-08-05

## 2025-08-05 RX ORDER — SEMAGLUTIDE 0.5 MG/.5ML
0.5 INJECTION, SOLUTION SUBCUTANEOUS
Qty: 2 ML | Refills: 1 | Status: SHIPPED | OUTPATIENT
Start: 2025-08-26

## 2025-08-05 RX ORDER — SEMAGLUTIDE 0.25 MG/.5ML
0.25 INJECTION, SOLUTION SUBCUTANEOUS
Qty: 2 ML | Refills: 0 | Status: SHIPPED | OUTPATIENT
Start: 2025-08-05

## 2025-08-05 ASSESSMENT — PATIENT HEALTH QUESTIONNAIRE - PHQ9
1. LITTLE INTEREST OR PLEASURE IN DOING THINGS: NOT AT ALL
2. FEELING DOWN, DEPRESSED OR HOPELESS: NOT AT ALL
SUM OF ALL RESPONSES TO PHQ9 QUESTIONS 1 AND 2: 0
SUM OF ALL RESPONSES TO PHQ9 QUESTIONS 1 AND 2: 0
CLINICAL INTERPRETATION OF PHQ2 SCORE: NO FURTHER SCREENING NEEDED

## 2025-08-06 ENCOUNTER — APPOINTMENT (OUTPATIENT)
Dept: NUTRITION | Age: 50
End: 2025-08-06

## 2025-08-06 DIAGNOSIS — R73.01 IFG (IMPAIRED FASTING GLUCOSE): Primary | ICD-10-CM

## 2025-08-07 ENCOUNTER — TELEPHONE (OUTPATIENT)
Dept: INTERNAL MEDICINE | Age: 50
End: 2025-08-07

## 2025-08-07 ENCOUNTER — APPOINTMENT (OUTPATIENT)
Dept: INTERNAL MEDICINE | Age: 50
End: 2025-08-07

## 2025-08-07 ENCOUNTER — APPOINTMENT (OUTPATIENT)
Dept: SPORTS MEDICINE | Age: 50
End: 2025-08-07
Attending: INTERNAL MEDICINE

## 2025-08-07 DIAGNOSIS — F32.A DEPRESSION, UNSPECIFIED DEPRESSION TYPE: ICD-10-CM

## 2025-08-07 DIAGNOSIS — F51.01 PRIMARY INSOMNIA: ICD-10-CM

## 2025-08-07 RX ORDER — AMITRIPTYLINE HYDROCHLORIDE 100 MG/1
100 TABLET ORAL NIGHTLY
Qty: 30 TABLET | Refills: 5 | Status: SHIPPED | OUTPATIENT
Start: 2025-08-07

## 2025-08-13 ENCOUNTER — APPOINTMENT (OUTPATIENT)
Dept: INTERNAL MEDICINE | Age: 50
End: 2025-08-13

## 2025-08-14 ENCOUNTER — APPOINTMENT (OUTPATIENT)
Dept: INTERNAL MEDICINE | Age: 50
End: 2025-08-14

## 2025-08-14 ENCOUNTER — APPOINTMENT (OUTPATIENT)
Dept: SPORTS MEDICINE | Age: 50
End: 2025-08-14
Attending: INTERNAL MEDICINE

## 2025-08-14 ENCOUNTER — IMAGING SERVICES (OUTPATIENT)
Dept: GENERAL RADIOLOGY | Age: 50
End: 2025-08-14
Attending: FAMILY MEDICINE

## 2025-08-14 VITALS
OXYGEN SATURATION: 96 % | DIASTOLIC BLOOD PRESSURE: 84 MMHG | TEMPERATURE: 98.8 F | HEART RATE: 85 BPM | BODY MASS INDEX: 33.43 KG/M2 | WEIGHT: 213 LBS | SYSTOLIC BLOOD PRESSURE: 118 MMHG | HEIGHT: 67 IN

## 2025-08-14 VITALS
DIASTOLIC BLOOD PRESSURE: 72 MMHG | HEIGHT: 67 IN | SYSTOLIC BLOOD PRESSURE: 116 MMHG | HEART RATE: 68 BPM | BODY MASS INDEX: 33.49 KG/M2 | WEIGHT: 213.4 LBS

## 2025-08-14 DIAGNOSIS — M25.532 LEFT WRIST PAIN: ICD-10-CM

## 2025-08-14 DIAGNOSIS — E66.09 CLASS 1 OBESITY DUE TO EXCESS CALORIES WITH SERIOUS COMORBIDITY AND BODY MASS INDEX (BMI) OF 33.0 TO 33.9 IN ADULT: Primary | ICD-10-CM

## 2025-08-14 DIAGNOSIS — E78.2 MIXED HYPERLIPIDEMIA: ICD-10-CM

## 2025-08-14 DIAGNOSIS — E03.9 ACQUIRED HYPOTHYROIDISM: ICD-10-CM

## 2025-08-14 DIAGNOSIS — I25.2 HISTORY OF MYOCARDIAL INFARCTION: ICD-10-CM

## 2025-08-14 DIAGNOSIS — E66.811 CLASS 1 OBESITY DUE TO EXCESS CALORIES WITH SERIOUS COMORBIDITY AND BODY MASS INDEX (BMI) OF 33.0 TO 33.9 IN ADULT: Primary | ICD-10-CM

## 2025-08-14 DIAGNOSIS — R20.2 PARESTHESIA OF LEFT ARM: ICD-10-CM

## 2025-08-14 DIAGNOSIS — Z85.71 H/O HODGKIN'S LYMPHOMA: ICD-10-CM

## 2025-08-14 DIAGNOSIS — R20.2 PARESTHESIAS: Primary | ICD-10-CM

## 2025-08-14 DIAGNOSIS — I10 ESSENTIAL HYPERTENSION: ICD-10-CM

## 2025-08-14 PROCEDURE — 73110 X-RAY EXAM OF WRIST: CPT | Performed by: RADIOLOGY

## 2025-08-14 SDOH — ECONOMIC STABILITY: TRANSPORTATION INSECURITY
IN THE PAST 12 MONTHS, HAS LACK OF RELIABLE TRANSPORTATION KEPT YOU FROM MEDICAL APPOINTMENTS, MEETINGS, WORK OR FROM GETTING THINGS NEEDED FOR DAILY LIVING?: YES

## 2025-08-14 SDOH — HEALTH STABILITY: MENTAL HEALTH: HOW MANY STANDARD DRINKS CONTAINING ALCOHOL DO YOU HAVE ON A TYPICAL DAY?: 0,1 OR 2

## 2025-08-14 SDOH — ECONOMIC STABILITY: FOOD INSECURITY: WITHIN THE PAST 12 MONTHS, THE FOOD YOU BOUGHT JUST DIDN'T LAST AND YOU DIDN'T HAVE MONEY TO GET MORE.: OFTEN TRUE

## 2025-08-14 SDOH — HEALTH STABILITY: MENTAL HEALTH: HOW OFTEN DO YOU HAVE 6 OR MORE DRINKS ON ONE OCCASION?: NEVER

## 2025-08-14 SDOH — ECONOMIC STABILITY: HOUSING INSECURITY: DO YOU HAVE PROBLEMS WITH ANY OF THE FOLLOWING?: NONE OF THE ABOVE

## 2025-08-14 SDOH — HEALTH STABILITY: MENTAL HEALTH: AUDIT TOTAL SCORE: 0

## 2025-08-14 SDOH — HEALTH STABILITY: MENTAL HEALTH: HOW OFTEN DO YOU HAVE A DRINK CONTAINING ALCOHOL?: NEVER

## 2025-08-14 SDOH — ECONOMIC STABILITY: HOUSING INSECURITY: WHAT IS YOUR LIVING SITUATION TODAY?: I HAVE A STEADY PLACE TO LIVE

## 2025-08-14 ASSESSMENT — SOCIAL DETERMINANTS OF HEALTH (SDOH): IN THE PAST 12 MONTHS, HAS THE ELECTRIC, GAS, OIL, OR WATER COMPANY THREATENED TO SHUT OFF SERVICE IN YOUR HOME?: NO

## 2025-08-15 PROBLEM — R20.2 PARESTHESIAS: Status: ACTIVE | Noted: 2025-08-15

## 2025-08-15 ASSESSMENT — ENCOUNTER SYMPTOMS
ALLEVIATING FACTORS: OVER-THE-COUNTER MEDICATION
ALLEVIATING FACTORS: REST
ALLEVIATING FACTORS: ICE
ALLEVIATING FACTORS: HEAT

## 2025-08-18 ENCOUNTER — APPOINTMENT (OUTPATIENT)
Dept: GASTROENTEROLOGY | Age: 50
End: 2025-08-18

## 2025-08-18 ENCOUNTER — TELEPHONE (OUTPATIENT)
Dept: FAMILY MEDICINE | Age: 50
End: 2025-08-18

## 2025-08-20 ENCOUNTER — APPOINTMENT (OUTPATIENT)
Dept: OCCUPATIONAL THERAPY | Age: 50
End: 2025-08-20

## 2025-08-20 DIAGNOSIS — R20.2 PARESTHESIAS: ICD-10-CM

## 2025-08-20 DIAGNOSIS — Z85.71 H/O HODGKIN'S LYMPHOMA: Primary | ICD-10-CM

## 2025-08-20 ASSESSMENT — ENCOUNTER SYMPTOMS
ALLEVIATING FACTORS: ICE
ALLEVIATING FACTORS: REST
ALLEVIATING FACTORS: OVER-THE-COUNTER MEDICATION
ALLEVIATING FACTORS: HEAT

## 2025-08-27 ENCOUNTER — APPOINTMENT (OUTPATIENT)
Dept: NUTRITION | Age: 50
End: 2025-08-27

## 2025-08-28 ENCOUNTER — APPOINTMENT (OUTPATIENT)
Dept: OCCUPATIONAL THERAPY | Age: 50
End: 2025-08-28
Attending: FAMILY MEDICINE

## 2025-08-28 DIAGNOSIS — R20.2 PARESTHESIAS: ICD-10-CM

## 2025-08-28 DIAGNOSIS — Z85.71 H/O HODGKIN'S LYMPHOMA: Primary | ICD-10-CM

## 2025-08-28 ASSESSMENT — ENCOUNTER SYMPTOMS
QUALITY: TINGLING
PAIN SEVERITY NOW: 0
PAIN SCALE AT LOWEST: 0
QUALITY: PINS AND NEEDLES
PAIN SCALE AT HIGHEST: 8
PAIN FREQUENCY: INTERMITTENT
ALLEVIATING FACTORS: CHANGE IN POSITION
QUALITY: NUMBNESS
QUALITY: SHOOTING
QUALITY: SHARP

## 2025-09-03 ENCOUNTER — APPOINTMENT (OUTPATIENT)
Dept: GASTROENTEROLOGY | Age: 50
End: 2025-09-03

## 2025-09-04 ENCOUNTER — APPOINTMENT (OUTPATIENT)
Dept: OCCUPATIONAL THERAPY | Age: 50
End: 2025-09-04
Attending: FAMILY MEDICINE

## 2025-09-10 ENCOUNTER — APPOINTMENT (OUTPATIENT)
Dept: OCCUPATIONAL THERAPY | Age: 50
End: 2025-09-10
Attending: FAMILY MEDICINE

## 2025-09-10 DIAGNOSIS — R20.2 PARESTHESIAS: ICD-10-CM

## 2025-09-10 DIAGNOSIS — Z85.71 H/O HODGKIN'S LYMPHOMA: Primary | ICD-10-CM

## 2025-09-16 ENCOUNTER — APPOINTMENT (OUTPATIENT)
Dept: NUTRITION | Age: 50
End: 2025-09-16

## 2025-10-23 ENCOUNTER — APPOINTMENT (OUTPATIENT)
Dept: INTERNAL MEDICINE | Age: 50
End: 2025-10-23

## 2026-02-11 ENCOUNTER — APPOINTMENT (OUTPATIENT)
Age: 51
End: 2026-02-11
Attending: FAMILY MEDICINE